# Patient Record
Sex: FEMALE | Race: WHITE | NOT HISPANIC OR LATINO | Employment: UNEMPLOYED | URBAN - METROPOLITAN AREA
[De-identification: names, ages, dates, MRNs, and addresses within clinical notes are randomized per-mention and may not be internally consistent; named-entity substitution may affect disease eponyms.]

---

## 2024-04-25 ENCOUNTER — HOSPITAL ENCOUNTER (EMERGENCY)
Facility: HOSPITAL | Age: 64
Discharge: HOME | End: 2024-04-25
Payer: COMMERCIAL

## 2024-04-25 VITALS
HEART RATE: 60 BPM | TEMPERATURE: 97 F | DIASTOLIC BLOOD PRESSURE: 80 MMHG | SYSTOLIC BLOOD PRESSURE: 120 MMHG | RESPIRATION RATE: 18 BRPM | OXYGEN SATURATION: 99 % | WEIGHT: 155 LBS | HEIGHT: 64 IN | BODY MASS INDEX: 26.46 KG/M2

## 2024-04-25 DIAGNOSIS — S05.01XA ABRASION OF RIGHT CORNEA, INITIAL ENCOUNTER: Primary | ICD-10-CM

## 2024-04-25 PROCEDURE — 90471 IMMUNIZATION ADMIN: CPT

## 2024-04-25 PROCEDURE — 99283 EMERGENCY DEPT VISIT LOW MDM: CPT | Mod: 25

## 2024-04-25 PROCEDURE — 2500000004 HC RX 250 GENERAL PHARMACY W/ HCPCS (ALT 636 FOR OP/ED)

## 2024-04-25 PROCEDURE — 2500000005 HC RX 250 GENERAL PHARMACY W/O HCPCS

## 2024-04-25 PROCEDURE — 90715 TDAP VACCINE 7 YRS/> IM: CPT

## 2024-04-25 PROCEDURE — 2500000001 HC RX 250 WO HCPCS SELF ADMINISTERED DRUGS (ALT 637 FOR MEDICARE OP)

## 2024-04-25 RX ORDER — ERYTHROMYCIN 5 MG/G
OINTMENT OPHTHALMIC 4 TIMES DAILY
Qty: 20 G | Refills: 0 | Status: SHIPPED | OUTPATIENT
Start: 2024-04-25 | End: 2024-04-30

## 2024-04-25 RX ORDER — TETRACAINE HYDROCHLORIDE 5 MG/ML
1 SOLUTION OPHTHALMIC ONCE
Status: COMPLETED | OUTPATIENT
Start: 2024-04-25 | End: 2024-04-25

## 2024-04-25 RX ORDER — TETRACAINE HYDROCHLORIDE 5 MG/ML
1 SOLUTION OPHTHALMIC ONCE
Status: DISCONTINUED | OUTPATIENT
Start: 2024-04-25 | End: 2024-04-25

## 2024-04-25 RX ORDER — TETRACAINE HYDROCHLORIDE 5 MG/ML
SOLUTION OPHTHALMIC
Status: COMPLETED
Start: 2024-04-25 | End: 2024-04-25

## 2024-04-25 RX ADMIN — TETRACAINE HYDROCHLORIDE 1 DROP: 5 SOLUTION/ DROPS OPHTHALMIC at 10:10

## 2024-04-25 RX ADMIN — FLUORESCEIN SODIUM 1 STRIP: 1 STRIP OPHTHALMIC at 10:10

## 2024-04-25 RX ADMIN — TETRACAINE HYDROCHLORIDE 1 DROP: 5 SOLUTION OPHTHALMIC at 10:10

## 2024-04-25 RX ADMIN — TETANUS TOXOID, REDUCED DIPHTHERIA TOXOID AND ACELLULAR PERTUSSIS VACCINE, ADSORBED 0.5 ML: 5; 2.5; 8; 8; 2.5 SUSPENSION INTRAMUSCULAR at 11:51

## 2024-04-25 ASSESSMENT — PAIN SCALES - GENERAL: PAINLEVEL_OUTOF10: 4

## 2024-04-25 ASSESSMENT — PAIN DESCRIPTION - ORIENTATION: ORIENTATION: RIGHT

## 2024-04-25 ASSESSMENT — LIFESTYLE VARIABLES
HAVE YOU EVER FELT YOU SHOULD CUT DOWN ON YOUR DRINKING: NO
HAVE PEOPLE ANNOYED YOU BY CRITICIZING YOUR DRINKING: NO
EVER HAD A DRINK FIRST THING IN THE MORNING TO STEADY YOUR NERVES TO GET RID OF A HANGOVER: NO
TOTAL SCORE: 0
EVER FELT BAD OR GUILTY ABOUT YOUR DRINKING: NO

## 2024-04-25 ASSESSMENT — PAIN DESCRIPTION - LOCATION: LOCATION: EYE

## 2024-04-25 ASSESSMENT — COLUMBIA-SUICIDE SEVERITY RATING SCALE - C-SSRS
1. IN THE PAST MONTH, HAVE YOU WISHED YOU WERE DEAD OR WISHED YOU COULD GO TO SLEEP AND NOT WAKE UP?: NO
2. HAVE YOU ACTUALLY HAD ANY THOUGHTS OF KILLING YOURSELF?: NO
6. HAVE YOU EVER DONE ANYTHING, STARTED TO DO ANYTHING, OR PREPARED TO DO ANYTHING TO END YOUR LIFE?: NO

## 2024-04-25 ASSESSMENT — PAIN DESCRIPTION - PAIN TYPE: TYPE: ACUTE PAIN

## 2024-04-25 ASSESSMENT — PAIN DESCRIPTION - DESCRIPTORS: DESCRIPTORS: BURNING;ACHING

## 2024-04-25 ASSESSMENT — PAIN - FUNCTIONAL ASSESSMENT: PAIN_FUNCTIONAL_ASSESSMENT: 0-10

## 2024-04-25 NOTE — ED PROVIDER NOTES
HPI   Chief Complaint   Patient presents with    Eye Pain     Right eye injured by projectile, unknown what object, pt believes it could have been a nail        Patient is a 63-year-old female presenting to the ED with cc of right eye injury times last night.  Patient states she was removing nails and something flew back and hit her eye.  Patient states she was not wearing any protective glasses.  Patient does wear glasses does not wear contact lenses.  Patient endorses eye pain.  Patient endorses photosensitivity.  Denies any pruritus.  Patient endorses clear discharge from the eye.  Patient does have visual changes in the eye states it is more blurry.  Patient denies any other symptoms.  Patient is not on any blood thinners.                          Bethesda Coma Scale Score: 15                     Patient History   History reviewed. No pertinent past medical history.  Past Surgical History:   Procedure Laterality Date    BACK SURGERY  01/30/2014    Back Surgery     No family history on file.  Social History     Tobacco Use    Smoking status: Not on file    Smokeless tobacco: Not on file   Substance Use Topics    Alcohol use: Not on file    Drug use: Not on file       Physical Exam   ED Triage Vitals [04/25/24 1008]   Temperature Heart Rate Respirations BP   36.1 °C (97 °F) 56 16 135/85      Pulse Ox Temp src Heart Rate Source Patient Position   98 % -- -- --      BP Location FiO2 (%)     -- --       Physical Exam  HENT:      Head: Normocephalic.   Eyes:      Extraocular Movements: Extraocular movements intact.      Pupils: Pupils are equal, round, and reactive to light.      Comments: Slight erythema of conjunctiva   Abdominal:      Palpations: Abdomen is soft.   Musculoskeletal:      Cervical back: Normal range of motion.   Neurological:      General: No focal deficit present.      Mental Status: She is alert and oriented to person, place, and time. Mental status is at baseline.         ED Course & MDM    Diagnoses as of 04/25/24 1205   Abrasion of right cornea, initial encounter       Medical Decision Making  Medical Decision Making:  Patient presented as described in HPI. Patient case including ROS, PE, and treatment and plan discussed with ED attending if attached as cosigner. Due to patients presentation orders completed include as documented.  Patient presents to the ED with cc of eye injury times last night.  Patient states she is having pain in the eye.  Patient wears glasses does not wear contact lenses.  Patient is nontoxic-appearing, PERRLA EOMI slightly injected conjunctiva, no obvious foreign body under the eyelids or of the eye.  Some periorbital edema.  Tetracaine drops were placed in the right eye.  Fluorescein stain Woods lamp was performed and there is a small corneal abrasion at the 11 o'clock position.  Jhon-Pen reading was 24.  Will repeat and check the left eye as well.  Visual acuity was not comfortable patient normally uses glasses which she does not have on cannot read the lines with both eyes.  Repeat, tonopen numbers for 26 and 22.  Left eye was 45.  Patient has no visual complaints of the left eye.  Do not believe these are accurate readings.  I spoke with Bello Shah patient's ophthalmologist whom she has an appointment with at 130 this afternoon who is aware of this and will evaluate patient in the office.  Patient updated on tetanus.  Patient discharged home with antibiotic eyedrops.  Patient educated on any worsening symptoms to return.  Patient endorses some improvement in symptoms.  Patient remained stable on discharge.  Patient was advised to follow up with PCP or recommended provider in 2-3 days for another evaluation and exam. I advised patient/guardian to return or go to closest emergency room immediately if symptoms change, get worse, new symptoms develop prior to follow up. If there is no improvement in symptoms in the next 24 hours they are advised to return for further  evaluation and exam. I also explained the plan and treatment course. Patient/guardian is in agreement with plan, treatment course, and follow up and states verbally that they will comply.      Patient care discussed with: N/A  Social Determinants affecting care: N/A    Final diagnosis and disposition as below.  See CI    Homegoing. I discussed the differential; results and discharge plan with the patient and/or family/friend/caregiver if present.  I emphasized the importance of follow-up with the physician I referred them to in the timeframe recommended.  I explained reasons for the patient to return to the Emergency Department. They agreed that if they feel their condition is worsening or if they have any other concern they should call 911 immediately for further assistance. I gave the patient an opportunity to ask all questions they had and answered all of them accordingly. They understand return precautions and discharge instructions. The patient and/or family/friend/caregiver expressed understanding verbally and that they would comply.       Disposition:  Discharge      This note has been transcribed using voice recognition and may contain grammatical errors, misplaced words, incorrect words, incorrect phrases or other errors.          Procedure  Procedures     Re aBrnes PA-C  04/25/24 1201

## 2024-04-30 ENCOUNTER — OFFICE VISIT (OUTPATIENT)
Dept: OPHTHALMOLOGY | Facility: CLINIC | Age: 64
End: 2024-04-30
Payer: COMMERCIAL

## 2024-04-30 ENCOUNTER — HOSPITAL ENCOUNTER (OUTPATIENT)
Dept: RADIOLOGY | Facility: HOSPITAL | Age: 64
Discharge: HOME | End: 2024-04-30
Payer: COMMERCIAL

## 2024-04-30 DIAGNOSIS — H27.8 ZONULAR DEHISCENCE: Primary | ICD-10-CM

## 2024-04-30 DIAGNOSIS — H43.01 VITREOUS PROLAPSE OF RIGHT EYE: ICD-10-CM

## 2024-04-30 DIAGNOSIS — S05.51XA INTRAOCULAR FOREIGN BODY OF RIGHT EYE, INITIAL ENCOUNTER: ICD-10-CM

## 2024-04-30 DIAGNOSIS — S05.91XA RIGHT EYE INJURY, INITIAL ENCOUNTER: ICD-10-CM

## 2024-04-30 PROCEDURE — 92285 EXTERNAL OCULAR PHOTOGRAPHY: CPT | Mod: RIGHT SIDE | Performed by: OPHTHALMOLOGY

## 2024-04-30 PROCEDURE — 70480 CT ORBIT/EAR/FOSSA W/O DYE: CPT

## 2024-04-30 PROCEDURE — 99204 OFFICE O/P NEW MOD 45 MIN: CPT | Performed by: OPHTHALMOLOGY

## 2024-04-30 PROCEDURE — 70480 CT ORBIT/EAR/FOSSA W/O DYE: CPT | Performed by: RADIOLOGY

## 2024-04-30 RX ORDER — PREDNISOLONE ACETATE 10 MG/ML
SUSPENSION/ DROPS OPHTHALMIC
COMMUNITY
Start: 2024-04-27 | End: 2024-05-25 | Stop reason: SDUPTHER

## 2024-04-30 RX ORDER — MOXIFLOXACIN 5 MG/ML
SOLUTION/ DROPS OPHTHALMIC
COMMUNITY
Start: 2024-04-25 | End: 2024-05-25 | Stop reason: HOSPADM

## 2024-04-30 RX ORDER — SODIUM PICOSULFATE, MAGNESIUM OXIDE, AND ANHYDROUS CITRIC ACID 10; 3.5; 12 MG/160ML; G/160ML; G/160ML
LIQUID ORAL
COMMUNITY
Start: 2022-08-26

## 2024-04-30 ASSESSMENT — EXTERNAL EXAM - RIGHT EYE: OD_EXAM: NORMAL

## 2024-04-30 ASSESSMENT — VISUAL ACUITY
OD_PH_CC: 20/100
OD_CC: 20/500
METHOD: SNELLEN - LINEAR

## 2024-04-30 ASSESSMENT — SLIT LAMP EXAM - LIDS
COMMENTS: NORMAL
COMMENTS: NORMAL

## 2024-04-30 ASSESSMENT — EXTERNAL EXAM - LEFT EYE: OS_EXAM: NORMAL

## 2024-04-30 ASSESSMENT — CUP TO DISC RATIO: OD_RATIO: 0.3

## 2024-04-30 NOTE — LETTER
April 30, 2024    Bello Shah MD  [facility]  [address] [address]    Patient: Saskia Koehler   YOB: 1960   Date of Visit: 4/30/2024       Dear Dr. Bello Shah MD:    Thank you for referring Saskia Koehler to me for evaluation. Below are the relevant portions of the exam, along with my assessment and plan of care.    Vision readings for this visit:  Visual Acuity (Snellen - Linear)         Right Left    Dist cc 20/500     Dist ph cc 20/100               Assessment/Plan:  Diagnoses and all orders for this visit:  Zonular dehiscence  Intraocular foreign body of right eye, initial encounter  -     CT orbit wo IV contrast; Future  Right eye injury, initial encounter  Vitreous prolapse of right eye  - patient presenting as a referral from outside provider (Dr. Bello Shah) for eye evaluation after trauma while removing nails from a 2x4 piece of wood  - no corneal laceration appreciated on exam, there is evidence of rust ring superotemporally  - noted to have zonular dehiscence superotemporally with vitroeus prolapsing into the anterior chamber (AC)  - DFE did not demonstrate any intraorbital foreign body - will get CT to rule out given the etiology of injury  - refer to retina after CT  - counseled that patient will likely need surgical removal of cataract to determine whether to proceed with anterior or posterior approach        If you have questions, please do not hesitate to call me. I look forward to following Saskia along with you.         Sincerely,        Ruben Liu MD        CC:   No Recipients

## 2024-04-30 NOTE — PROGRESS NOTES
Assessment/Plan   Diagnoses and all orders for this visit:  Zonular dehiscence  Intraocular foreign body of right eye, initial encounter  -     CT orbit wo IV contrast; Future  Right eye injury, initial encounter  Vitreous prolapse of right eye  - patient presenting as a referral from outside provider (Dr. Bello Shah) for eye evaluation after trauma while removing nails from a 2x4 piece of wood  - no corneal laceration appreciated on exam, there is evidence of rust ring superotemporally  - noted to have zonular dehiscence superotemporally with vitroeus prolapsing into the anterior chamber (AC)  - DFE did not demonstrate any intraorbital foreign body - will get CT to rule out given the etiology of injury  - refer to retina after CT  - counseled that patient will likely need surgical removal of cataract to determine whether to proceed with anterior or posterior approach

## 2024-05-02 ENCOUNTER — OFFICE VISIT (OUTPATIENT)
Dept: OPHTHALMOLOGY | Facility: CLINIC | Age: 64
End: 2024-05-02
Payer: MEDICARE

## 2024-05-02 DIAGNOSIS — H53.40 UNSPECIFIED VISUAL FIELD DEFECTS: ICD-10-CM

## 2024-05-02 DIAGNOSIS — H43.01 VITREOUS PROLAPSE OF RIGHT EYE: Primary | ICD-10-CM

## 2024-05-02 DIAGNOSIS — H26.101 TRAUMATIC CATARACT OF RIGHT EYE, UNSPECIFIED TRAUMATIC CATARACT TYPE: ICD-10-CM

## 2024-05-02 DIAGNOSIS — S05.91XD RIGHT EYE INJURY, SUBSEQUENT ENCOUNTER: ICD-10-CM

## 2024-05-02 PROCEDURE — 92134 CPTRZ OPH DX IMG PST SGM RTA: CPT | Performed by: OPHTHALMOLOGY

## 2024-05-02 PROCEDURE — 92136 OPHTHALMIC BIOMETRY: CPT | Performed by: OPHTHALMOLOGY

## 2024-05-02 PROCEDURE — 99213 OFFICE O/P EST LOW 20 MIN: CPT | Performed by: OPHTHALMOLOGY

## 2024-05-02 PROCEDURE — 92136 OPHTHALMIC BIOMETRY: CPT | Mod: BILATERAL PROCEDURE | Performed by: OPHTHALMOLOGY

## 2024-05-02 RX ORDER — PROPARACAINE HYDROCHLORIDE 5 MG/ML
1 SOLUTION/ DROPS OPHTHALMIC ONCE
Status: CANCELLED | OUTPATIENT
Start: 2024-05-02 | End: 2024-05-02

## 2024-05-02 RX ORDER — PHENYLEPHRINE HYDROCHLORIDE 25 MG/ML
1 SOLUTION/ DROPS OPHTHALMIC
Status: CANCELLED | OUTPATIENT
Start: 2024-05-02 | End: 2024-05-02

## 2024-05-02 RX ORDER — TROPICAMIDE 10 MG/ML
1 SOLUTION/ DROPS OPHTHALMIC
Status: CANCELLED | OUTPATIENT
Start: 2024-05-02 | End: 2024-05-02

## 2024-05-02 ASSESSMENT — EXTERNAL EXAM - LEFT EYE: OS_EXAM: NORMAL

## 2024-05-02 ASSESSMENT — VISUAL ACUITY
OD_CC: 20/300
OS_CC: 20/20
OD_PH_CC: 20/125
OS_CC+: -2
OD_PH_CC+: -1
METHOD: SNELLEN - LINEAR

## 2024-05-02 ASSESSMENT — EXTERNAL EXAM - RIGHT EYE: OD_EXAM: NORMAL

## 2024-05-02 ASSESSMENT — SLIT LAMP EXAM - LIDS
COMMENTS: NORMAL
COMMENTS: NORMAL

## 2024-05-02 ASSESSMENT — ENCOUNTER SYMPTOMS
GASTROINTESTINAL NEGATIVE: 0
NEUROLOGICAL NEGATIVE: 0
ENDOCRINE NEGATIVE: 0
RESPIRATORY NEGATIVE: 0
HEMATOLOGIC/LYMPHATIC NEGATIVE: 0
EYES NEGATIVE: 1
PSYCHIATRIC NEGATIVE: 0
CARDIOVASCULAR NEGATIVE: 0
ALLERGIC/IMMUNOLOGIC NEGATIVE: 0
CONSTITUTIONAL NEGATIVE: 0
MUSCULOSKELETAL NEGATIVE: 0

## 2024-05-02 ASSESSMENT — TONOMETRY
OS_IOP_MMHG: 11
OD_IOP_MMHG: 15
IOP_METHOD: GOLDMANN APPLANATION

## 2024-05-02 ASSESSMENT — CUP TO DISC RATIO: OD_RATIO: 0.3

## 2024-05-02 NOTE — PROGRESS NOTES
Subjective   Patient ID: Saskia Koehler is a 63 y.o. female.      HPI    64 YO F NP referred by Dr Liu for vitreous prolapse of OD, PT reports vision is the same, pt states always has floaters but they are unchanged, no flashes, no pian or irritation to eyes, PT using: moxi, pred and erythro  Last edited by Magalis Carvalho on 5/2/2024  2:17 PM.        Current Outpatient Medications (Ophthalmology pharm classes)   Medication Sig Dispense Refill    moxifloxacin (Vigamox) 0.5 % ophthalmic solution Administer 1 drop into right eye four times daily.      prednisoLONE acetate (Pred-Forte) 1 % ophthalmic suspension Administer 1 drop into right eye four times daily. Continue till follow up as directed       Current Outpatient Medications (Other)   Medication Sig Dispense Refill    sod picosulf-mag ox-citric ac (Clenpiq) 10 mg-3.5 gram- 12 gram/160 mL solution Take by mouth.         Objective   Base Eye Exam       Visual Acuity (Snellen - Linear)         Right Left    Dist cc 20/300 20/20 -2    Dist ph cc 20/125 -1               Tonometry (Goldmann Applanation, 2:24 PM)         Right Left    Pressure 15 11              Pupils         Light React APD    Right 6 None None    Left 2.5 Brisk None              Neuro/Psych       Oriented x3: Yes    Mood/Affect: Normal              Dilation       Both eyes: 1% Mydriacyl & 2.5% Winston  @ 2:24 PM                  Slit Lamp and Fundus Exam       External Exam         Right Left    External Normal Normal              Slit Lamp Exam         Right Left    Lids/Lashes Normal Normal    Conjunctiva/Sclera White and quiet White and quiet    Cornea No laceration, superotemporal rust ring Clear    Anterior Chamber 2+ Cell, vitrous prolapse superiorly Deep and quiet    Iris Round and reactive Round and reactive    Lens Clear, zonular dehiscence superotemporal Clear    Anterior Vitreous hazy view, vitreous prolapse temporally Normal              Fundus Exam         Right Left    Disc Normal Normal     C/D Ratio 0.3     Macula Normal Normal    Vessels Normal Normal    Periphery Normal Normal                  Imaging    Macula OCT 05/02/24  Right eye (OD): Normal contour and appearance, no IRF/subretinal fluid (SRF)  Left eye (OS): Normal contour and appearance, no IRF/subretinal fluid (SRF)      Assessment/Plan       Zonular dehiscence  Intraocular foreign body of right eye, initial encounter  -     CT orbit wo IV contrast; IMPRESSION: Unremarkable CT appearance of the orbits. No radiopaque foreign body.  Right eye injury, initial encounter  Vitreous prolapse of right eye  - patient presenting as a referral from outside provider (Dr. Bello Shah) for eye evaluation after trauma while removing nails from a 2x4 piece of wood  - no corneal laceration appreciated on exam, there is evidence of rust ring superotemporally  - noted to have zonular dehiscence superotemporally with vitroeus prolapsing into the anterior chamber (AC)  - CT with no foreign body (FB) noted  -Today no new floaters, no flashes, no shadows/curtains  -Vitreous prolapse, phacodonesis  -Calcs obtained    ADDENDUM 5/3/24: After discussion with Dr. Liu, will plan for staged approach, with anterior vitrectomy/cataract extraction (CE)/possible intraocular lens (IOL) with Dr. Liu. If capsular support insufficient for in the capsule or sulcus lens at that time, can then schedule for pars plana vitrectomy (PPV)/secondary intraocular lens (IOL) with me. Will call patient to discuss.

## 2024-05-10 ENCOUNTER — OFFICE VISIT (OUTPATIENT)
Dept: OPHTHALMOLOGY | Facility: CLINIC | Age: 64
End: 2024-05-10
Payer: MEDICARE

## 2024-05-10 DIAGNOSIS — S05.91XD RIGHT EYE INJURY, SUBSEQUENT ENCOUNTER: ICD-10-CM

## 2024-05-10 DIAGNOSIS — H27.8 ZONULAR DEHISCENCE: ICD-10-CM

## 2024-05-10 DIAGNOSIS — H26.101 TRAUMATIC CATARACT OF RIGHT EYE, UNSPECIFIED TRAUMATIC CATARACT TYPE: Primary | ICD-10-CM

## 2024-05-10 DIAGNOSIS — H43.01 VITREOUS PROLAPSE OF RIGHT EYE: ICD-10-CM

## 2024-05-10 PROCEDURE — 99213 OFFICE O/P EST LOW 20 MIN: CPT | Performed by: OPHTHALMOLOGY

## 2024-05-10 PROCEDURE — 1036F TOBACCO NON-USER: CPT | Performed by: OPHTHALMOLOGY

## 2024-05-10 PROCEDURE — 1160F RVW MEDS BY RX/DR IN RCRD: CPT | Performed by: OPHTHALMOLOGY

## 2024-05-10 PROCEDURE — 1159F MED LIST DOCD IN RCRD: CPT | Performed by: OPHTHALMOLOGY

## 2024-05-10 ASSESSMENT — CONF VISUAL FIELD
OD_INFERIOR_NASAL_RESTRICTION: 0
OD_INFERIOR_TEMPORAL_RESTRICTION: 0
OD_SUPERIOR_TEMPORAL_RESTRICTION: 0
OD_NORMAL: 1
OS_SUPERIOR_TEMPORAL_RESTRICTION: 0
OS_INFERIOR_NASAL_RESTRICTION: 0
OS_SUPERIOR_NASAL_RESTRICTION: 0
OS_NORMAL: 1
OS_INFERIOR_TEMPORAL_RESTRICTION: 0
OD_SUPERIOR_NASAL_RESTRICTION: 0
METHOD: COUNTING FINGERS

## 2024-05-10 ASSESSMENT — VISUAL ACUITY
CORRECTION_TYPE: GLASSES
METHOD: SNELLEN - LINEAR
OS_CC: 20/40
OD_CC+: -1
OD_CC: 20/400

## 2024-05-10 ASSESSMENT — TONOMETRY
IOP_METHOD: GOLDMANN APPLANATION
OD_IOP_MMHG: 18
OS_IOP_MMHG: 16

## 2024-05-10 ASSESSMENT — EXTERNAL EXAM - RIGHT EYE: OD_EXAM: NORMAL

## 2024-05-10 ASSESSMENT — SLIT LAMP EXAM - LIDS
COMMENTS: NORMAL
COMMENTS: NORMAL

## 2024-05-10 ASSESSMENT — EXTERNAL EXAM - LEFT EYE: OS_EXAM: NORMAL

## 2024-05-10 ASSESSMENT — CUP TO DISC RATIO: OD_RATIO: 0.3

## 2024-05-10 NOTE — PROGRESS NOTES
Assessment/Plan   Diagnoses and all orders for this visit:  Zonular dehiscence  Intraocular foreign body of right eye, initial encounter  -     CT orbit wo IV contrast; Future  Right eye injury, initial encounter  Vitreous prolapse of right eye  - Pt scheduled for CE/CTR/CTS/anterior vitrectomy 5/20  - However, amount of subluxation seems to be more extensive today   - Discussed different approaches of dealing with the subluxated lens (anterior vs posterior vs combined approach)  - Will discuss with Dr. Guzmán possibility for combined surgery. If not, I prefer that he would do this through a posterior approach.  - Discussed with pt and her     Addendum: After discussing with Dr. Guzmán, he will be performing the surgery. Info relayed to the pt.

## 2024-05-13 ENCOUNTER — PREP FOR PROCEDURE (OUTPATIENT)
Dept: OPHTHALMOLOGY | Facility: HOSPITAL | Age: 64
End: 2024-05-13
Payer: MEDICARE

## 2024-05-14 PROBLEM — H43.01 VITREOUS PROLAPSE OF RIGHT EYE: Status: ACTIVE | Noted: 2024-05-02

## 2024-05-14 PROBLEM — H26.101 TRAUMATIC CATARACT OF RIGHT EYE: Status: ACTIVE | Noted: 2024-05-02

## 2024-05-15 ENCOUNTER — ANESTHESIA EVENT (OUTPATIENT)
Dept: OPERATING ROOM | Facility: CLINIC | Age: 64
End: 2024-05-15

## 2024-05-15 VITALS — BODY MASS INDEX: 26.66 KG/M2 | WEIGHT: 155.31 LBS

## 2024-05-17 RX ORDER — ACETAMINOPHEN 325 MG/1
650 TABLET ORAL EVERY 4 HOURS PRN
Status: CANCELLED | OUTPATIENT
Start: 2024-05-17

## 2024-05-17 RX ORDER — ALBUTEROL SULFATE 0.83 MG/ML
2.5 SOLUTION RESPIRATORY (INHALATION) ONCE AS NEEDED
Status: CANCELLED | OUTPATIENT
Start: 2024-05-17

## 2024-05-17 RX ORDER — LIDOCAINE IN NACL,ISO-OSMOT/PF 30 MG/3 ML
0.1 SYRINGE (ML) INJECTION ONCE
Status: CANCELLED | OUTPATIENT
Start: 2024-05-17 | End: 2024-05-17

## 2024-05-17 RX ORDER — FENTANYL CITRATE 50 UG/ML
25 INJECTION, SOLUTION INTRAMUSCULAR; INTRAVENOUS EVERY 5 MIN PRN
Status: CANCELLED | OUTPATIENT
Start: 2024-05-17

## 2024-05-17 RX ORDER — LABETALOL HYDROCHLORIDE 5 MG/ML
5 INJECTION, SOLUTION INTRAVENOUS ONCE AS NEEDED
Status: CANCELLED | OUTPATIENT
Start: 2024-05-17

## 2024-05-17 RX ORDER — SODIUM CHLORIDE, SODIUM LACTATE, POTASSIUM CHLORIDE, CALCIUM CHLORIDE 600; 310; 30; 20 MG/100ML; MG/100ML; MG/100ML; MG/100ML
100 INJECTION, SOLUTION INTRAVENOUS CONTINUOUS
Status: CANCELLED | OUTPATIENT
Start: 2024-05-17

## 2024-05-17 RX ORDER — METOCLOPRAMIDE HYDROCHLORIDE 5 MG/ML
10 INJECTION INTRAMUSCULAR; INTRAVENOUS ONCE AS NEEDED
Status: CANCELLED | OUTPATIENT
Start: 2024-05-17

## 2024-05-17 RX ORDER — FENTANYL CITRATE 50 UG/ML
50 INJECTION, SOLUTION INTRAMUSCULAR; INTRAVENOUS EVERY 5 MIN PRN
Status: CANCELLED | OUTPATIENT
Start: 2024-05-17

## 2024-05-17 RX ORDER — ONDANSETRON HYDROCHLORIDE 2 MG/ML
4 INJECTION, SOLUTION INTRAVENOUS ONCE AS NEEDED
Status: CANCELLED | OUTPATIENT
Start: 2024-05-17

## 2024-05-20 ENCOUNTER — HOSPITAL ENCOUNTER (OUTPATIENT)
Facility: CLINIC | Age: 64
Setting detail: OUTPATIENT SURGERY
Discharge: HOME | End: 2024-05-20
Attending: OPHTHALMOLOGY | Admitting: OPHTHALMOLOGY
Payer: MEDICARE

## 2024-05-20 ENCOUNTER — ANESTHESIA (OUTPATIENT)
Dept: OPERATING ROOM | Facility: CLINIC | Age: 64
End: 2024-05-20
Payer: MEDICARE

## 2024-05-20 NOTE — H&P
History Of Present Illness  Saskia Koehler is a 64 y.o. female presenting with subluxed Crystaline lens, right eye.     Past Medical History  Past Medical History:   Diagnosis Date    Corneal abrasion     Diverticulosis     Eye trauma     Nephrolithiasis        Surgical History  Past Surgical History:   Procedure Laterality Date    BACK SURGERY  01/30/2014    Back Surgery    BACK SURGERY Bilateral     COLONOSCOPY          Social History  She reports that she has never smoked. She has never been exposed to tobacco smoke. She has never used smokeless tobacco. She reports current alcohol use of about 5.0 standard drinks of alcohol per week. Drug use questions deferred to the physician.    Family History  Family History   Problem Relation Name Age of Onset    No Known Problems Mother          Allergies  Morphine    Review of Systems  Review of Systems   Constitutional:  Negative for activity change and appetite change.   HENT:  Negative for sinus pressure and sinus pain.    Eyes:  Negative for pain, discharge, redness and itching.   Respiratory:  Negative for shortness of breath.    Cardiovascular:  Negative for chest pain.   Gastrointestinal:  Negative for abdominal distention and abdominal pain.   Endocrine: Negative for polyuria.   Genitourinary:  Negative for dysuria.   Musculoskeletal:  Negative for arthralgias.   Neurological:  Negative for headaches.   Psychiatric/Behavioral:  The patient is not nervous/anxious       Physical Exam  Physical Exam  HENT:      Head: Normocephalic and atraumatic.   Eyes:      Extraocular Movements: Extraocular movements intact.      Conjunctiva/sclera: Conjunctivae normal.      Pupils: Pupils are equal, round, and reactive to light.   Cardiovascular:      Pulses: Normal pulses.   Pulmonary:      Effort: Pulmonary effort is normal.   Abdominal:      General: Abdomen is flat.      Palpations: Abdomen is soft.   Musculoskeletal:         General: No deformity.   Skin:     General: Skin  is warm and dry.   Neurological:      General: No focal deficit present.      Mental Status: He is alert and oriented to person, place, and time.   Psychiatric:         Mood and Affect: Mood normal.        Last Recorded Vitals  Weight 70.5 kg (155 lb 5 oz).    Relevant Results         Assessment/Plan   Active Problems:    Vitreous prolapse of right eye    Traumatic cataract of right eye      Patient presents for lens surgery due to subluxed crystalline lens, right eye. Will proceed with pars plana vitrectomy (PPV)/secondary IOL, OD, operative note to follow.             Edilson Rod MD

## 2024-05-21 ENCOUNTER — PREP FOR PROCEDURE (OUTPATIENT)
Dept: OPHTHALMOLOGY | Facility: CLINIC | Age: 64
End: 2024-05-21
Payer: MEDICARE

## 2024-05-21 DIAGNOSIS — H26.101 TRAUMATIC CATARACT OF RIGHT EYE, UNSPECIFIED TRAUMATIC CATARACT TYPE: Primary | ICD-10-CM

## 2024-05-21 RX ORDER — TROPICAMIDE 10 MG/ML
1 SOLUTION/ DROPS OPHTHALMIC
OUTPATIENT
Start: 2024-05-21 | End: 2024-05-21

## 2024-05-21 RX ORDER — PHENYLEPHRINE HYDROCHLORIDE 25 MG/ML
1 SOLUTION/ DROPS OPHTHALMIC
OUTPATIENT
Start: 2024-05-21 | End: 2024-05-21

## 2024-05-21 RX ORDER — PROPARACAINE HYDROCHLORIDE 5 MG/ML
1 SOLUTION/ DROPS OPHTHALMIC ONCE
OUTPATIENT
Start: 2024-05-21 | End: 2024-05-21

## 2024-05-23 ENCOUNTER — PREP FOR PROCEDURE (OUTPATIENT)
Dept: OPHTHALMOLOGY | Facility: CLINIC | Age: 64
End: 2024-05-23
Payer: MEDICARE

## 2024-05-23 DIAGNOSIS — H26.131 TOTAL TRAUMATIC CATARACT, RIGHT EYE: Primary | ICD-10-CM

## 2024-05-23 RX ORDER — TROPICAMIDE 10 MG/ML
1 SOLUTION/ DROPS OPHTHALMIC
Status: CANCELLED | OUTPATIENT
Start: 2024-05-23 | End: 2024-05-23

## 2024-05-23 RX ORDER — PHENYLEPHRINE HYDROCHLORIDE 25 MG/ML
1 SOLUTION/ DROPS OPHTHALMIC
Status: CANCELLED | OUTPATIENT
Start: 2024-05-23 | End: 2024-05-23

## 2024-05-23 RX ORDER — PROPARACAINE HYDROCHLORIDE 5 MG/ML
1 SOLUTION/ DROPS OPHTHALMIC ONCE
Status: CANCELLED | OUTPATIENT
Start: 2024-05-23 | End: 2024-05-23

## 2024-05-24 ENCOUNTER — ANESTHESIA EVENT (OUTPATIENT)
Dept: OPERATING ROOM | Facility: CLINIC | Age: 64
End: 2024-05-24
Payer: MEDICARE

## 2024-05-24 ENCOUNTER — ANESTHESIA (OUTPATIENT)
Dept: OPERATING ROOM | Facility: CLINIC | Age: 64
End: 2024-05-24
Payer: MEDICARE

## 2024-05-24 ENCOUNTER — HOSPITAL ENCOUNTER (OUTPATIENT)
Facility: CLINIC | Age: 64
Setting detail: OUTPATIENT SURGERY
Discharge: HOME | End: 2024-05-24
Attending: OPHTHALMOLOGY | Admitting: OPHTHALMOLOGY
Payer: MEDICARE

## 2024-05-24 VITALS
DIASTOLIC BLOOD PRESSURE: 69 MMHG | HEIGHT: 64 IN | HEART RATE: 54 BPM | SYSTOLIC BLOOD PRESSURE: 119 MMHG | OXYGEN SATURATION: 97 % | TEMPERATURE: 96.8 F | RESPIRATION RATE: 16 BRPM | WEIGHT: 163.36 LBS | BODY MASS INDEX: 27.89 KG/M2

## 2024-05-24 DIAGNOSIS — H26.131 TOTAL TRAUMATIC CATARACT, RIGHT EYE: ICD-10-CM

## 2024-05-24 DIAGNOSIS — H26.131 TOTAL TRAUMATIC CATARACT OF RIGHT EYE: Primary | ICD-10-CM

## 2024-05-24 PROCEDURE — 3600000008 HC OR TIME - EACH INCREMENTAL 1 MINUTE - PROCEDURE LEVEL THREE: Performed by: OPHTHALMOLOGY

## 2024-05-24 PROCEDURE — 2500000005 HC RX 250 GENERAL PHARMACY W/O HCPCS: Performed by: OPHTHALMOLOGY

## 2024-05-24 PROCEDURE — 67036 REMOVAL OF INNER EYE FLUID: CPT | Performed by: STUDENT IN AN ORGANIZED HEALTH CARE EDUCATION/TRAINING PROGRAM

## 2024-05-24 PROCEDURE — 3700000002 HC GENERAL ANESTHESIA TIME - EACH INCREMENTAL 1 MINUTE: Performed by: OPHTHALMOLOGY

## 2024-05-24 PROCEDURE — 2500000004 HC RX 250 GENERAL PHARMACY W/ HCPCS (ALT 636 FOR OP/ED): Performed by: ANESTHESIOLOGIST ASSISTANT

## 2024-05-24 PROCEDURE — 67036 REMOVAL OF INNER EYE FLUID: CPT | Performed by: OPHTHALMOLOGY

## 2024-05-24 PROCEDURE — 2500000004 HC RX 250 GENERAL PHARMACY W/ HCPCS (ALT 636 FOR OP/ED): Performed by: OPHTHALMOLOGY

## 2024-05-24 PROCEDURE — 3700000001 HC GENERAL ANESTHESIA TIME - INITIAL BASE CHARGE: Performed by: OPHTHALMOLOGY

## 2024-05-24 PROCEDURE — 7100000009 HC PHASE TWO TIME - INITIAL BASE CHARGE: Performed by: OPHTHALMOLOGY

## 2024-05-24 PROCEDURE — A4217 STERILE WATER/SALINE, 500 ML: HCPCS | Performed by: OPHTHALMOLOGY

## 2024-05-24 PROCEDURE — 2720000007 HC OR 272 NO HCPCS: Performed by: OPHTHALMOLOGY

## 2024-05-24 PROCEDURE — 7100000010 HC PHASE TWO TIME - EACH INCREMENTAL 1 MINUTE: Performed by: OPHTHALMOLOGY

## 2024-05-24 PROCEDURE — 2500000001 HC RX 250 WO HCPCS SELF ADMINISTERED DRUGS (ALT 637 FOR MEDICARE OP): Performed by: OPHTHALMOLOGY

## 2024-05-24 PROCEDURE — 66985 INSERT LENS PROSTHESIS: CPT | Performed by: OPHTHALMOLOGY

## 2024-05-24 PROCEDURE — A67036 PR VITRECTOMY,MECHANICAL: Performed by: ANESTHESIOLOGIST ASSISTANT

## 2024-05-24 PROCEDURE — A67036 PR VITRECTOMY,MECHANICAL: Performed by: ANESTHESIOLOGY

## 2024-05-24 PROCEDURE — 2780000003 HC OR 278 NO HCPCS: Performed by: OPHTHALMOLOGY

## 2024-05-24 PROCEDURE — 3600000003 HC OR TIME - INITIAL BASE CHARGE - PROCEDURE LEVEL THREE: Performed by: OPHTHALMOLOGY

## 2024-05-24 DEVICE — 3-PIECE, MONOFOCAL, HYDROPHOBIC, ACRYLIC, INTRAOCULAR LENS, +19.0D
Type: IMPLANTABLE DEVICE | Site: EYE | Status: FUNCTIONAL
Brand: CT LUCIA

## 2024-05-24 RX ORDER — ATROPINE SULFATE 10 MG/ML
SOLUTION/ DROPS OPHTHALMIC AS NEEDED
Status: DISCONTINUED | OUTPATIENT
Start: 2024-05-24 | End: 2024-05-24 | Stop reason: HOSPADM

## 2024-05-24 RX ORDER — LIDOCAINE HYDROCHLORIDE 10 MG/ML
INJECTION, SOLUTION EPIDURAL; INFILTRATION; INTRACAUDAL; PERINEURAL AS NEEDED
Status: DISCONTINUED | OUTPATIENT
Start: 2024-05-24 | End: 2024-05-24 | Stop reason: HOSPADM

## 2024-05-24 RX ORDER — SODIUM CHLORIDE, SODIUM LACTATE, POTASSIUM CHLORIDE, CALCIUM CHLORIDE 600; 310; 30; 20 MG/100ML; MG/100ML; MG/100ML; MG/100ML
INJECTION, SOLUTION INTRAVENOUS CONTINUOUS PRN
Status: DISCONTINUED | OUTPATIENT
Start: 2024-05-24 | End: 2024-05-24

## 2024-05-24 RX ORDER — DEXAMETHASONE SODIUM PHOSPHATE 100 MG/10ML
INJECTION INTRAMUSCULAR; INTRAVENOUS AS NEEDED
Status: DISCONTINUED | OUTPATIENT
Start: 2024-05-24 | End: 2024-05-24 | Stop reason: HOSPADM

## 2024-05-24 RX ORDER — PHENYLEPHRINE HYDROCHLORIDE 25 MG/ML
1 SOLUTION/ DROPS OPHTHALMIC
Status: COMPLETED | OUTPATIENT
Start: 2024-05-24 | End: 2024-05-24

## 2024-05-24 RX ORDER — PROPARACAINE HYDROCHLORIDE 5 MG/ML
1 SOLUTION/ DROPS OPHTHALMIC ONCE
Status: COMPLETED | OUTPATIENT
Start: 2024-05-24 | End: 2024-05-24

## 2024-05-24 RX ORDER — CEFAZOLIN 1 G/1
INJECTION, POWDER, FOR SOLUTION INTRAVENOUS AS NEEDED
Status: DISCONTINUED | OUTPATIENT
Start: 2024-05-24 | End: 2024-05-24 | Stop reason: HOSPADM

## 2024-05-24 RX ORDER — PREDNISOLONE ACETATE 10 MG/ML
1 SUSPENSION/ DROPS OPHTHALMIC 4 TIMES DAILY
Qty: 5 ML | Refills: 2 | Status: SHIPPED | OUTPATIENT
Start: 2024-05-24 | End: 2024-05-25 | Stop reason: SDUPTHER

## 2024-05-24 RX ORDER — POVIDONE-IODINE 5 %
SOLUTION, NON-ORAL OPHTHALMIC (EYE) AS NEEDED
Status: DISCONTINUED | OUTPATIENT
Start: 2024-05-24 | End: 2024-05-24 | Stop reason: HOSPADM

## 2024-05-24 RX ORDER — MIDAZOLAM HYDROCHLORIDE 1 MG/ML
INJECTION, SOLUTION INTRAMUSCULAR; INTRAVENOUS AS NEEDED
Status: DISCONTINUED | OUTPATIENT
Start: 2024-05-24 | End: 2024-05-24

## 2024-05-24 RX ORDER — BUPIVACAINE HYDROCHLORIDE 7.5 MG/ML
INJECTION, SOLUTION EPIDURAL; RETROBULBAR AS NEEDED
Status: DISCONTINUED | OUTPATIENT
Start: 2024-05-24 | End: 2024-05-24 | Stop reason: HOSPADM

## 2024-05-24 RX ORDER — WATER 1 ML/ML
IRRIGANT IRRIGATION AS NEEDED
Status: DISCONTINUED | OUTPATIENT
Start: 2024-05-24 | End: 2024-05-24 | Stop reason: HOSPADM

## 2024-05-24 RX ORDER — OFLOXACIN 3 MG/ML
1 SOLUTION/ DROPS OPHTHALMIC 4 TIMES DAILY
Qty: 5 ML | Refills: 0 | Status: SHIPPED | OUTPATIENT
Start: 2024-05-24 | End: 2024-05-31

## 2024-05-24 RX ORDER — WATER 1000 ML/1000ML
INJECTION, SOLUTION INTRAVENOUS CONTINUOUS PRN
Status: COMPLETED | OUTPATIENT
Start: 2024-05-24 | End: 2024-05-24

## 2024-05-24 RX ORDER — TRIAMCINOLONE ACETONIDE 40 MG/ML
INJECTION, SUSPENSION INTRA-ARTICULAR; INTRAMUSCULAR AS NEEDED
Status: DISCONTINUED | OUTPATIENT
Start: 2024-05-24 | End: 2024-05-24 | Stop reason: HOSPADM

## 2024-05-24 RX ORDER — TROPICAMIDE 10 MG/ML
1 SOLUTION/ DROPS OPHTHALMIC
Status: COMPLETED | OUTPATIENT
Start: 2024-05-24 | End: 2024-05-24

## 2024-05-24 RX ORDER — PROPOFOL 10 MG/ML
INJECTION, EMULSION INTRAVENOUS AS NEEDED
Status: DISCONTINUED | OUTPATIENT
Start: 2024-05-24 | End: 2024-05-24

## 2024-05-24 RX ADMIN — PHENYLEPHRINE HYDROCHLORIDE 1 DROP: 25 SOLUTION/ DROPS OPHTHALMIC at 08:30

## 2024-05-24 RX ADMIN — TROPICAMIDE 1 DROP: 10 SOLUTION/ DROPS OPHTHALMIC at 08:20

## 2024-05-24 RX ADMIN — PHENYLEPHRINE HYDROCHLORIDE 1 DROP: 25 SOLUTION/ DROPS OPHTHALMIC at 08:40

## 2024-05-24 RX ADMIN — TROPICAMIDE 1 DROP: 10 SOLUTION/ DROPS OPHTHALMIC at 08:30

## 2024-05-24 RX ADMIN — TROPICAMIDE 1 DROP: 10 SOLUTION/ DROPS OPHTHALMIC at 08:40

## 2024-05-24 RX ADMIN — PROPOFOL 70 MG: 10 INJECTION, EMULSION INTRAVENOUS at 09:13

## 2024-05-24 RX ADMIN — SODIUM CHLORIDE, SODIUM LACTATE, POTASSIUM CHLORIDE, AND CALCIUM CHLORIDE: .6; .31; .03; .02 INJECTION, SOLUTION INTRAVENOUS at 09:09

## 2024-05-24 RX ADMIN — MIDAZOLAM 2 MG: 1 INJECTION INTRAMUSCULAR; INTRAVENOUS at 09:11

## 2024-05-24 RX ADMIN — PHENYLEPHRINE HYDROCHLORIDE 1 DROP: 25 SOLUTION/ DROPS OPHTHALMIC at 08:20

## 2024-05-24 RX ADMIN — PROPARACAINE HYDROCHLORIDE 1 DROP: 5 SOLUTION/ DROPS OPHTHALMIC at 08:20

## 2024-05-24 ASSESSMENT — PAIN SCALES - GENERAL
PAINLEVEL_OUTOF10: 0 - NO PAIN

## 2024-05-24 ASSESSMENT — COLUMBIA-SUICIDE SEVERITY RATING SCALE - C-SSRS
6. HAVE YOU EVER DONE ANYTHING, STARTED TO DO ANYTHING, OR PREPARED TO DO ANYTHING TO END YOUR LIFE?: NO
2. HAVE YOU ACTUALLY HAD ANY THOUGHTS OF KILLING YOURSELF?: NO
1. IN THE PAST MONTH, HAVE YOU WISHED YOU WERE DEAD OR WISHED YOU COULD GO TO SLEEP AND NOT WAKE UP?: NO

## 2024-05-24 ASSESSMENT — PAIN - FUNCTIONAL ASSESSMENT
PAIN_FUNCTIONAL_ASSESSMENT: 0-10

## 2024-05-24 NOTE — ANESTHESIA POSTPROCEDURE EVALUATION
Patient: Saskia Koehler    Procedure Summary       Date: 05/24/24 Room / Location: [facility] OR 04 / Virtual [facility] OR    Anesthesia Start: 0902 Anesthesia Stop: 1110    Procedure: Vitrectomy Pars Plana with cataract extraction and secondary IOL placement (Right) Diagnosis:       Total traumatic cataract, right eye      (Total traumatic cataract, right eye [H26.131])    Surgeons: aMnoj Guzmán MD Responsible Provider: Odalis Persaud DO    Anesthesia Type: MAC ASA Status: 2            Anesthesia Type: MAC    Vitals Value Taken Time   /69 05/24/24 1120   Temp 36 °C (96.8 °F) 05/24/24 1120   Pulse 54 05/24/24 1120   Resp 16 05/24/24 1120   SpO2 97 % 05/24/24 1120       Anesthesia Post Evaluation    Patient location during evaluation: PACU  Patient participation: complete - patient participated  Level of consciousness: awake  Pain management: satisfactory to patient  Multimodal analgesia pain management approach  Airway patency: patent  Cardiovascular status: acceptable  Respiratory status: acceptable  Hydration status: acceptable  Postoperative Nausea and Vomiting: none    There were no known notable events for this encounter.

## 2024-05-24 NOTE — H&P
History Of Present Illness  Saskia Koehler is a 64 y.o. female presenting with posterior dislocation of the crystalline lens of the RIGHT  EYE. This has caused visual decline. .     Past Medical History  She has a past medical history of Corneal abrasion, Diverticulosis, Eye trauma, and Nephrolithiasis.    Surgical History  She has a past surgical history that includes Back surgery (01/30/2014); Back surgery (Bilateral); and Colonoscopy.     Social History  She reports that she has never smoked. She has never been exposed to tobacco smoke. She has never used smokeless tobacco. She reports current alcohol use of about 5.0 standard drinks of alcohol per week. Drug use questions deferred to the physician.     Allergies  Morphine    ROS  Patient denies ocular pain, redness, discharge, decreased vision, double vision, blind spots, flashes, or floaters.   Patient also denied any recent illnesses/infections, angina, dyspnea,palpitations, cough, wheeze, abdominal pain, melena, hematuria  Physical Exam  Alert & oriented x 3  Regular radial pulses, bilateral  Normal chest rise with respirations       Assessment/Plan   Principal Problem:    Traumatic cataract of right eye  Active Problems:    Total traumatic cataract, right eye      Will plan to proceed with pars plana vitrectomy, lensectomy, with secondary scleral fixated intraocular lens implant - right eye    I spent 10 minutes in the professional and overall care of this patient.      Shukri Corral MD

## 2024-05-24 NOTE — ANESTHESIA PREPROCEDURE EVALUATION
Patient: Saskia Koehler    Procedure Information       Date/Time: 05/24/24 0900    Procedure: Vitrectomy Pars Plana with cataract extraction and secondary IOL placement (Right)    [place] [facility] [place] [facility] [place] [facility] / [facility] [place] [facility]    Surgeons: Manoj Guzmán MD            Relevant Problems   Anesthesia (within normal limits)      Cardiac (within normal limits)  > 4 mets      Pulmonary (within normal limits)      Neuro (within normal limits)      GI (within normal limits)      /Renal (within normal limits)      Liver (within normal limits)      Endocrine (within normal limits)      Hematology (within normal limits)      Musculoskeletal (within normal limits)      HEENT (within normal limits)      ID (within normal limits)      Skin (within normal limits)      GYN (within normal limits)       Clinical information reviewed:   Tobacco  Allergies  Meds   Med Hx  Surg Hx  OB Status  Fam Hx  Soc   Hx        NPO Detail:  NPO/Void Status  Date of Last Liquid: 05/23/24  Time of Last Liquid: 1900  Date of Last Solid: 05/23/24  Time of Last Solid: 1900  Time of Last Void: 0814         Physical Exam    Airway  Mallampati: II  TM distance: >3 FB  Neck ROM: full     Cardiovascular    Dental - normal exam     Pulmonary    Abdominal        Anesthesia Plan    History of general anesthesia?: yes  History of complications of general anesthesia?: no    ASA 2     MAC     intravenous induction   Anesthetic plan and risks discussed with patient.    Plan discussed with CAA.

## 2024-05-24 NOTE — BRIEF OP NOTE
Date: 2024  OR Location: [facility] OR    Name: Saskia Koehler, : 1960, Age: 64 y.o., MRN: [id number], Sex: female    Diagnosis  Pre-op Diagnosis     * Total traumatic cataract, right eye [H26.131] Post-op Diagnosis     * Total traumatic cataract, right eye [H26.131]     Procedures  Vitrectomy Pars Plana with cataract extraction and secondary IOL placement  67335 - ND VITRECTOMY MECHANICAL PARS PLANA    ND INSJ IO LENS PROSTHESIS NOT W/CONCURRENT RMVL [21700]  Surgeons      * Manoj Guzmán - Primary    Resident/Fellow/Other Assistant:  Surgeons and Role:     * Shukri Corral MD - Assisting    Procedure Summary  Anesthesia: Monitor Anesthesia Care  ASA: II  Anesthesia Staff: Anesthesiologist: Odalis Persaud DO  C-AA: INOCENCIO Luna  Estimated Blood Loss: 0 mL  Intra-op Medications:   Administrations occurring from 0750 to 0950 on 24:   Medication Name Total Dose   balanced salts (BSS) intraocular solution 15 mL   balanced salts (BSS Plus) intraocular solution 500 mL   sterile water irrigation solution 100 mL   povidone-iodine 5 % ophthalmic solution 1 Application   bupivacaine PF (Marcaine) injection 0.75 % 4 mL   triamcinolone acetonide (Kenalog-40) injection 40 mg   chondroitin sulf-sod hyaluron (Viscoat) intraocular injection 0.5 mL   lidocaine PF (Xylocaine) 10 mg/mL (1 %) injection 4 mL   phenylephrine (Mydfrin) 2.5 % ophthalmic solution 1 drop 3 drop   proparacaine (Alcaine) 0.5 % ophthalmic solution 1 drop 1 drop   tropicamide (Mydriacyl) 1 % ophthalmic solution 1 drop 3 drop          Anesthesia Record               Intraprocedure I/O Totals          Intake    LR infusion 200.00 mL    Total Intake 200 mL          Specimen: No specimens collected     Staff:   Circulator: Alexus Cat Person: Roger    Findings: Total traumatic cataract - right eye    Complications:  None; patient tolerated the procedure well.     Disposition: PACU - hemodynamically stable.  Condition:  stable  Specimens Collected: No specimens collected    Attending Attestation:     A qualified resident physician was not available and the use of a skilled assistant surgeon, Shukri Corral MD, was required for the following portions of this case: Pars plana vitrectomy with scleral depression, lensectomy, secondary scleral fixated intraocular lens - right eye    Manoj Guzmán  Phone Number: [phone]

## 2024-05-24 NOTE — OP NOTE
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]     Procedures  Vitrectomy Pars Plana with cataract extraction and secondary IOL placement  86134 - SD VITRECTOMY MECHANICAL PARS PLANA    SD INSJ IO LENS PROSTHESIS NOT W/CONCURRENT RMVL [60193]  Surgeons      * Manoj Guzmán - Primary    Resident/Fellow/Other Assistant:  Surgeons and Role:     * Shukri Corral MD - Assisting    Procedure Summary  Anesthesia: Monitor Anesthesia Care  ASA: II  Anesthesia Staff: Anesthesiologist: Odalis Persaud DO  C-AA: INOCENCIO Luna  Estimated Blood Loss: 0 mL  Intra-op Medications:   Administrations occurring from 0750 to 0950 on 24:   Medication Name Total Dose   balanced salts (BSS) intraocular solution 15 mL   balanced salts (BSS Plus) intraocular solution 500 mL   sterile water irrigation solution 100 mL   povidone-iodine 5 % ophthalmic solution 1 Application   bupivacaine PF (Marcaine) injection 0.75 % 4 mL   triamcinolone acetonide (Kenalog-40) injection 40 mg   chondroitin sulf-sod hyaluron (Viscoat) intraocular injection 0.5 mL   lidocaine PF (Xylocaine) 10 mg/mL (1 %) injection 4 mL   phenylephrine (Mydfrin) 2.5 % ophthalmic solution 1 drop 3 drop   proparacaine (Alcaine) 0.5 % ophthalmic solution 1 drop 1 drop   tropicamide (Mydriacyl) 1 % ophthalmic solution 1 drop 3 drop          Anesthesia Record               Intraprocedure I/O Totals          Intake    LR infusion 200.00 mL    Total Intake 200 mL          Specimen: No specimens collected     Staff:   Circulator: Alexus Cat Person: Roger         Drains and/or Catheters: * None in log *    Tourniquet Times: n/a         Implants:  Implants       Type Name Action Serial No.       19.0 DIOPTER, CT SARAH 602.US INTRAOCULAR LENS, 3-PIECE, MONOFOCAL, HYDROPHOBIC, ACRYLIC Implanted [id number]              Findings: total traumatic cataract, right eye    Indications: Saskia Koehler is an 64 y.o. 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     The patient was seen in the preoperative area. The risks, benefits, complications, treatment options, non-operative alternatives, expected recovery and outcomes were discussed with the patient. The possibilities of reaction to medication, pulmonary aspiration, injury to surrounding structures, bleeding, recurrent infection, the need for additional procedures, failure to diagnose a condition, and creating a complication requiring transfusion or operation were discussed with the patient. The patient concurred with the proposed plan, giving informed consent.  The site of surgery was properly noted/marked if necessary per policy. The patient has been actively warmed in preoperative area. Preoperative antibiotics are not indicated. Venous thrombosis prophylaxis are not indicated.    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]     OPERATION PERFORMED  Right  25-gauge pars plana vitrectomy, Pars plana lensectomy, Intravitreal triesence, and Secondary scleral fixation of intraocular lens (Modified Yamane Technique)     SURGEON: Manoj Guzmán MD    ASSISTANT: Shukri Corral MD    ANESTHESIA  Monitored anesthesia care with a standard block consisting of a 1:1 mixture of 4 %  lidocaine and 0.75% Marcaine with Wydase     FINDINGS  As detailed below     COMPLICATIONS  None     ESTIMATED BLOOD LOSS  Minimal     SPECIMENS REMOVED  None     JUSTIFICATION  Saskia Koehler  had a posterior endocapsular dislocation of an intraocular lens in her right eye in the setting of trauma. This was causing decreased visual function. The risks, benefits, and alternatives to the procedure were discussed with the patient including the risk for vision loss, blindness, retinal detachment, infection, bleeding, pain, high or low pressure in the eye, double vision, no benefit, need for additional  procedures, and droopy eyelid, amongst others. The patient had a full opportunity to have all questions answered in clinic prior to surgery. Afterwards, the patient requested that we perform surgery and signed the consent form.     PROCEDURE:  The patient was brought to the preoperative holding area where the correct eye was confirmed and marked. The patient was brought to the operating room where a secondary time-out was  performed to identify the correct patient, eye, procedure, and any allergies. The patient then underwent intravenous sedation by the anesthesia team followed by a block as described above. The eye was prepped and draped in the usual sterile ophthalmic fashion followed by placement of a lid speculum.     Using a toric marker, the horizontal meridian from 3 to 9 oclock was marked. A 25 gauge trocar was inserted inferotemporally 4 mm posterior to the limbus after conjunctival displacement. A 4 mm infusion cannula was placed through this trocar, and the infusion cannula was confirmed in the vitreous cavity prior to turning it on.  A second 27-gauge trocar was placed 3 mm posterior and 2 mm superior to the limbus at the nasal horizontal meridian . A third 27-gauge trocar was placed 3 mm posterior and 2 mm inferior to the limbus at the temporal horizontal meridian. Two additional 25 gauge trocars were placed at 11 and 12 oclock  4mm posterior to the limbus..      At this time, a standard three-port pars plana vitrectomy was performed using the light pipe, the cutter, and the BIOM viewing system. A  core vitreous dissection was performed. The retina was inspected and was found to be attached with no retinal tears. A small superior conjunctival peritomy was made 3mm posterior to the limbus with wescot scissors. A 20 gauge MVR blade was used to make a superior sclerotomy approximated 3 mm in length. A 20 gauge fragmatome was used to remove the traumatic cataract; care was taken to ensure that all nuclear and cortical fragments were removed.  Intravitreal triamcinolone was instilled into the eye to stain the vitreous. A PVD was induced the the vitrector and Additional peripheral shave vitrectomy was then performed.     Using the vitreous cutter, all residual capsular material was dissected and aspirated. Peripheral scleral depression was performed and no retinal tears were visualized. A 3-piece CT Nilsa 602 lens (Power: +19.00 D, SN: [id number], Exp: 10/31/2026), providing plano correction, was then selected and injected into the anterior segment. Once the lens was brought to the anterior vitreous cavity, the surgeon grasped the tip of the leading haptic with his left hand using a 27g scleral fixation forceps and gently externalized through the left sclerotomy after the trocar had been removed in an upward fashion along the shaft of the forceps. Once externalized, the tip of the haptic was melted for 1 mm with a low temperature handheld cautery to form a distal bulb. This same technique was repeated for the rightward haptic in the same fashion and using a 27g scleral fixation forceps through the right trocar, followed by cauterization of the externalized haptic tip. The haptics were inserted into their respective sclerotomy tunnels and the lens was suspended posterior to the iris and centralized. The lens was noted to mild tilted so the decision was made to perform laser locking; the light pipe entered through the superotemporal trocar to stabilize the lens in its correct planar orientation. The laser was then  introduced through the main corneal incision and endolaser was applied to both haptic/optic junctions to lock the haptics in the correct orientation. After the laser  lock the lens appeared well centered and stable without obvious tilt    The corneal wound was sutured with 10-0 ethilon. There was no panfilo present. The superonasal trocar was removed and sutured with 7-0 vicryl. The superotemporal trocar was removed and was found to be watertight. The infusion cannula and associated trocar were then removed and found to be watertight. The eye was  confirmed to be at a physiologic level by digital palpation after removal of the trocars.     Subconjunctival injection of Cefazolin and Dexamethasone were administered. The lid speculum and drapes were removed. Atropine drops and Antibiotic ointment was applied. The eye was patched and shielded. The patient tolerated the procedure well without any intraoperative or immediate postoperative complications. The patient was taken to the recovery room in good condition. The patient will follow up with Manoj Guzmán MD in clinic on the following morning.    Complications:  None; patient tolerated the procedure well.    Disposition: PACU - hemodynamically stable.  Condition: stable     Additional Details:     A qualified resident physician was not available and the use of a skilled assistant surgeon, Shukri Corral MD, was required for the following portions of this case: Pars plana vitrectomy with scleral depression, lensectomy, secondary scleral fixated intraocular lens, lens laser locking - right eye    Attending Attestation:     Manoj Guzmán  Phone Number: [phone]

## 2024-05-24 NOTE — DISCHARGE INSTRUCTIONS
Please keep the eye patched/shielded until your post op day 1 appointment tomorrow.    Appointment: [facility], [facility]  Eye Clinic, 3rd floor - You will be seen by the resident physician on call, the time is to be determined     Please do not perform any strenuous activity, bending below the waist, until you are cleared by your doctor.

## 2024-05-25 ENCOUNTER — DOCUMENTATION (OUTPATIENT)
Dept: OPHTHALMOLOGY | Facility: HOSPITAL | Age: 64
End: 2024-05-25
Payer: MEDICARE

## 2024-05-25 DIAGNOSIS — T85.22XA DISLOCATED IOL (INTRAOCULAR LENS), POSTERIOR: Primary | ICD-10-CM

## 2024-05-25 RX ORDER — MOXIFLOXACIN 5 MG/ML
1 SOLUTION/ DROPS OPHTHALMIC 4 TIMES DAILY
Qty: 5 ML | Refills: 1 | Status: SHIPPED | OUTPATIENT
Start: 2024-05-25 | End: 2024-06-01

## 2024-05-25 RX ORDER — PREDNISOLONE ACETATE 10 MG/ML
1 SUSPENSION/ DROPS OPHTHALMIC 4 TIMES DAILY
Qty: 5 ML | Refills: 1 | Status: SHIPPED | OUTPATIENT
Start: 2024-05-25 | End: 2024-06-08

## 2024-05-25 ASSESSMENT — VISUAL ACUITY
OS_PH_SC: 20/25+2
OS_SC: 20/60
OD_SC: 20/80-1
METHOD: SNELLEN - LINEAR
OD_PH_SC: 20/30

## 2024-05-25 ASSESSMENT — SLIT LAMP EXAM - LIDS
COMMENTS: NORMAL
COMMENTS: NORMAL

## 2024-05-25 ASSESSMENT — EXTERNAL EXAM - RIGHT EYE: OD_EXAM: NORMAL

## 2024-05-25 ASSESSMENT — EXTERNAL EXAM - LEFT EYE: OS_EXAM: NORMAL

## 2024-05-26 NOTE — PROGRESS NOTES
HPI:  Pt is 64 YOF status post (s/p) pars plana vitrectomy (PPV)/lensectomy/w seocndary intraocular lens (IOL) placement (Arielle/Seraly 5/24/24) here for postop day 1.    Pt is doing well, some moderate to mild pain relieved by tylenol.  .   Pt denies  discharge, decreased vision, double vision, blind spots, flashes or floaters.    PMH: As per HPI  Allergies: allergy list reviewed  Past Ocular Hx: As per HPI  Family Hx: No hx of glaucoma, age related macular degeneration, blindness  Social Hx: lives with family    ROS: negative in all 14 systems except for those listed in HPI  Medications: the list in the system reviewed    Visual Acuity with Correction at Distance: right Eye 20/80 PH 20/30 left eye 20/25  Pupils: OD pharmacologically dilated  Intraocular Pressure with Tonopen: right eye 19; left eye 16  Extra Ocular Movements: intact and full both eyes  Confrontational Visual Field: full to count fingers both eyes    Anterior Segment, Right Eye:   External: no bruises and step-offs around the eye  Lids/Lashes: within normal limits, no ptosis, no meibomian gland dysfunction, no blepharitis  Conjunctiva and Sclera: no hyperemia, no chemosis  Cornea: 1+ SPL  Anterior Chamber: 3-4+ cell/flare  Iris: pharmacologically dilated  Lens: Secondary intraocular lens (IOL) in good position     Assessment/Plan:    #Status post (s/p) pars plana vitrectomy (PPV)/lensectomy/w seocndary intraocular lens (IOL) placement (Arielle/Seraly 5/24/24)   -Pt with 3+ cell, intraocular pressure (IOP) WNL, Vision good  -Recommend pred forte QID OD  -Recommend vigamox QID OD   -F/U Thurs with Dr. Guzmán (secretaries emailed)  -Return precautions discussed    Discussed with Dr. Guzmán who agrees with assessment and plan as above.    Felicia Osborne PGY3  Ophthalmology    Ophthalmology Adult Pager - [phone]  Ophthalmology Pediatrics Pager - [phone]    For adult follow-up appointments, call: [phone]  For pediatric follow-up appointments, call:  [phone]      NOTE: This note is not finalized until attending reviews and signs.

## 2024-05-29 NOTE — PROGRESS NOTES
Subjective   Patient ID: Saskia Koehler is a 64 y.o. female.    Chief Complaint    Post-op Follow-up       HPI       Post-op Follow-up    In right eye.  Discomfort includes floaters.  Vision is blurred at distance.             Comments    Patient presents for POV Vitrectomy Pars Plana with cataract extraction and secondary IOL placement OD.  She states her eye is very uncomfortable OD. Denies any pain.no flashes,          Last edited by LAMONT Canseco on 5/30/2024  1:08 PM.          Current Outpatient Medications (Ophthalmology pharm classes)   Medication Sig Dispense Refill    moxifloxacin (Vigamox) 0.5 % ophthalmic solution Administer 1 drop into the right eye 4 times a day for 7 days. 5 mL 1    ofloxacin (Ocuflox) 0.3 % ophthalmic solution Administer 1 drop into the right eye 4 times a day for 7 days. 5 mL 0    prednisoLONE acetate (Pred-Forte) 1 % ophthalmic suspension Administer 1 drop into the right eye 4 times a day for 14 days. 5 mL 1     Current Outpatient Medications (Other)   Medication Sig Dispense Refill    sod picosulf-mag ox-citric ac (Clenpiq) 10 mg-3.5 gram- 12 gram/160 mL solution Take by mouth.         Objective   Base Eye Exam       Visual Acuity (Snellen - Linear)         Right Left    Dist sc 20/70-2     Dist ph sc 20/25               Tonometry (Goldmann Applanation, 1:13 PM)         Right Left    Pressure 12               Pupils         Pupils    Right PERRL, No APD    Left PERRL, No APD              Neuro/Psych       Oriented x3: Yes    Mood/Affect: Normal                  Slit Lamp and Fundus Exam       External Exam         Right Left    External Normal Normal              Slit Lamp Exam         Right Left    Lids/Lashes Normal Normal    Conjunctiva/Sclera resolving PK, haptics in place and visible under conj White and quiet    Cornea Clear Clear    Anterior Chamber trace cell/flare Deep and quiet    Iris Round and reactive Round and reactive    Lens Scleral fixated IOL in  good position Clear    Anterior Vitreous Normal Normal              Fundus Exam         Right Left    Disc Normal     C/D Ratio 0.3     Macula Normal     Vessels Normal     Periphery Normal                     Assessment/Plan   Zonular dehiscence  Intraocular foreign body of right eye, initial encounter  -     CT orbit wo IV contrast; IMPRESSION: Unremarkable CT appearance of the orbits. No radiopaque foreign body.  Right eye injury, initial encounter  Vitreous prolapse of right eye  - patient presented as a referral from outside provider (Dr. Bello Shah) for eye evaluation after trauma while removing nails from a 2x4 piece of wood  - was noted to have zonular dehiscence superotemporally with vitroeus prolapsing into the anterior chamber (AC)  - CT with no foreign body (FB) noted  -Now status post (s/p) pars plana vitrectomy (PPV)/secondary intraocular lens (IOL) placement (Yamane technique) right eye 5/24/24  -Reports a gritty discomfort that temporarily relieves with drops  -PF QID and moxi QID  -Doing well, intraocular pressure (IOP) appears well centered and flat, haptics visible and in good positon under conj, BCVA with PH today 20/25, retina in place   -Continue moxi for 1 more day, then stop  -Start PF taper in one day, TID x 1 week, BID x 1 week, daily x 1 week, then stop  -F/up 3-4 weeks, sooner PRN

## 2024-05-30 ENCOUNTER — OFFICE VISIT (OUTPATIENT)
Dept: OPHTHALMOLOGY | Facility: CLINIC | Age: 64
End: 2024-05-30
Payer: MEDICARE

## 2024-05-30 DIAGNOSIS — H27.8 ZONULAR DEHISCENCE: ICD-10-CM

## 2024-05-30 DIAGNOSIS — S05.91XD RIGHT EYE INJURY, SUBSEQUENT ENCOUNTER: ICD-10-CM

## 2024-05-30 DIAGNOSIS — H43.01 VITREOUS PROLAPSE OF RIGHT EYE: ICD-10-CM

## 2024-05-30 DIAGNOSIS — H26.101 TRAUMATIC CATARACT OF RIGHT EYE, UNSPECIFIED TRAUMATIC CATARACT TYPE: Primary | ICD-10-CM

## 2024-05-30 PROCEDURE — 99024 POSTOP FOLLOW-UP VISIT: CPT | Performed by: OPHTHALMOLOGY

## 2024-05-30 ASSESSMENT — SLIT LAMP EXAM - LIDS
COMMENTS: NORMAL
COMMENTS: NORMAL

## 2024-05-30 ASSESSMENT — CUP TO DISC RATIO: OD_RATIO: 0.3

## 2024-05-30 ASSESSMENT — VISUAL ACUITY
METHOD: SNELLEN - LINEAR
OD_SC: 20/70-2
OD_PH_SC: 20/25

## 2024-05-30 ASSESSMENT — EXTERNAL EXAM - RIGHT EYE: OD_EXAM: NORMAL

## 2024-05-30 ASSESSMENT — TONOMETRY
IOP_METHOD: GOLDMANN APPLANATION
OD_IOP_MMHG: 12

## 2024-05-30 ASSESSMENT — EXTERNAL EXAM - LEFT EYE: OS_EXAM: NORMAL

## 2024-06-30 NOTE — PROGRESS NOTES
Subjective   Patient ID: Saskia Koehler is a 64 y.o. female.    Chief Complaint    Follow-up         HPI    64 Year old female presents for 4 week fuv, Vitreous prolapse of right eye. She is using PF qday. She states vision is improving but is still blurry, She states she had an episode last Wednesday that her whole right eye was painful and headache, but resolved by the next day. She has flashes and floaters, mostly stable, she has more floaters than before. She states sometimes in her right eye she'll see a visual disturbance that is similar to old CRT TVs shaking before turning off. She states it does not happen often and has no specific trigger.   Last edited by LAMONT Stern on 7/1/2024  8:10 AM.            No current outpatient medications on file. (Ophthalmology pharm classes)       Current Outpatient Medications (Other)   Medication Sig Dispense Refill    sod picosulf-mag ox-citric ac (Clenpiq) 10 mg-3.5 gram- 12 gram/160 mL solution Take by mouth.         Objective   Base Eye Exam       Visual Acuity (Snellen - Linear)         Right Left    Dist sc 20/100 -2     Dist cc  20/40 -2    Dist ph sc 20/50 +2               Tonometry (Goldmann Applanation, 8:15 AM)         Right Left    Pressure 10 16              Pupils         Pupils React    Right PERRL, No APD Minimal    Left PERRL, No APD Brisk              Visual Fields         Left Right     Full Full              Extraocular Movement         Right Left     Full Full              Neuro/Psych       Oriented x3: Yes    Mood/Affect: Normal              Dilation       Both eyes: 1% Mydriacyl & 2.5% Winston  @ 8:11 AM                  Slit Lamp and Fundus Exam       External Exam         Right Left    External Normal Normal              Slit Lamp Exam         Right Left    Lids/Lashes Normal Normal    Conjunctiva/Sclera resolving PK, haptics in place and visible under conj White and quiet    Cornea tr edema Clear    Anterior Chamber 2+ cell Deep and  quiet    Iris Round and reactive Round and reactive    Lens Scleral fixated IOL in good position Clear    Anterior Vitreous Normal Normal              Fundus Exam         Right Left    Disc Normal     C/D Ratio 0.3     Macula Normal     Vessels Normal     Periphery Normal                   Imaging    Macula OCT 07/01/24  Right eye (OD): Normal retinal appearance, no IRF/subretinal fluid (SRF), trace choroidal folds  Left eye (OS): Normal contour and appearance, no IRF/subretinal fluid (SRF)    Assessment/Plan   Zonular dehiscence  Intraocular foreign body of right eye, initial encounter  -     CT orbit wo IV contrast; IMPRESSION: Unremarkable CT appearance of the orbits. No radiopaque foreign body.  Right eye injury, initial encounter  Vitreous prolapse of right eye  - patient presented as a referral from outside provider (Dr. Bello Shah) for eye evaluation after trauma while removing nails from a 2x4 piece of wood  - was noted to have zonular dehiscence superotemporally with vitroeus prolapsing into the anterior chamber (AC)  - CT with no foreign body (FB) noted  -Now status post (s/p) pars plana vitrectomy (PPV)/secondary intraocular lens (IOL) placement (Yamane technique) right eye 5/24/24  -Doing well, intraocular pressure (IOP) appears well centered and flat, haptics visible and in good positon under conj  - BCVA with PH today 20/50, decreased from last visit  - exam with more cell now and some corneal edema, likely rebound iritis from Pred taper  - Restart Pred QID  -F/up 3-4 weeks, sooner PRN

## 2024-07-01 ENCOUNTER — APPOINTMENT (OUTPATIENT)
Dept: OPHTHALMOLOGY | Facility: CLINIC | Age: 64
End: 2024-07-01
Payer: MEDICARE

## 2024-07-01 DIAGNOSIS — H35.372 EPIRETINAL MEMBRANE (ERM) OF LEFT EYE: ICD-10-CM

## 2024-07-01 DIAGNOSIS — H43.01 VITREOUS PROLAPSE OF RIGHT EYE: Primary | ICD-10-CM

## 2024-07-01 DIAGNOSIS — H26.101 TRAUMATIC CATARACT OF RIGHT EYE, UNSPECIFIED TRAUMATIC CATARACT TYPE: ICD-10-CM

## 2024-07-01 DIAGNOSIS — H27.8 ZONULAR DEHISCENCE: ICD-10-CM

## 2024-07-01 PROCEDURE — 92134 CPTRZ OPH DX IMG PST SGM RTA: CPT | Performed by: OPHTHALMOLOGY

## 2024-07-01 PROCEDURE — 99024 POSTOP FOLLOW-UP VISIT: CPT | Performed by: OPHTHALMOLOGY

## 2024-07-01 ASSESSMENT — SLIT LAMP EXAM - LIDS
COMMENTS: NORMAL
COMMENTS: NORMAL

## 2024-07-01 ASSESSMENT — ENCOUNTER SYMPTOMS
HEMATOLOGIC/LYMPHATIC NEGATIVE: 0
EYES NEGATIVE: 0
MUSCULOSKELETAL NEGATIVE: 0
RESPIRATORY NEGATIVE: 0
CARDIOVASCULAR NEGATIVE: 0
ALLERGIC/IMMUNOLOGIC NEGATIVE: 0
CONSTITUTIONAL NEGATIVE: 0
PSYCHIATRIC NEGATIVE: 0
NEUROLOGICAL NEGATIVE: 0
ENDOCRINE NEGATIVE: 0
GASTROINTESTINAL NEGATIVE: 0

## 2024-07-01 ASSESSMENT — VISUAL ACUITY
OD_SC+: -2
METHOD: SNELLEN - LINEAR
OS_CC: 20/40
OD_PH_SC+: +2
OD_PH_SC: 20/50
OD_SC: 20/100
OS_CC+: -2

## 2024-07-01 ASSESSMENT — TONOMETRY
IOP_METHOD: GOLDMANN APPLANATION
OD_IOP_MMHG: 10
OS_IOP_MMHG: 16

## 2024-07-01 ASSESSMENT — EXTERNAL EXAM - RIGHT EYE: OD_EXAM: NORMAL

## 2024-07-01 ASSESSMENT — CONF VISUAL FIELD
OD_SUPERIOR_TEMPORAL_RESTRICTION: 0
OD_SUPERIOR_NASAL_RESTRICTION: 0
OD_NORMAL: 1
OS_SUPERIOR_NASAL_RESTRICTION: 0
OS_INFERIOR_NASAL_RESTRICTION: 0
OD_INFERIOR_NASAL_RESTRICTION: 0
OS_NORMAL: 1
OD_INFERIOR_TEMPORAL_RESTRICTION: 0
OS_SUPERIOR_TEMPORAL_RESTRICTION: 0
OS_INFERIOR_TEMPORAL_RESTRICTION: 0

## 2024-07-01 ASSESSMENT — EXTERNAL EXAM - LEFT EYE: OS_EXAM: NORMAL

## 2024-07-01 ASSESSMENT — CUP TO DISC RATIO: OD_RATIO: 0.3

## 2024-07-16 ENCOUNTER — HOSPITAL ENCOUNTER (OUTPATIENT)
Facility: HOSPITAL | Age: 64
Setting detail: OUTPATIENT SURGERY
End: 2024-07-16
Attending: OPHTHALMOLOGY | Admitting: OPHTHALMOLOGY
Payer: MEDICARE

## 2024-07-16 ENCOUNTER — PREP FOR PROCEDURE (OUTPATIENT)
Dept: OPHTHALMOLOGY | Facility: CLINIC | Age: 64
End: 2024-07-16
Payer: MEDICARE

## 2024-07-16 ENCOUNTER — PREP FOR PROCEDURE (OUTPATIENT)
Dept: OPHTHALMOLOGY | Facility: HOSPITAL | Age: 64
End: 2024-07-16
Payer: MEDICARE

## 2024-07-16 ENCOUNTER — DOCUMENTATION (OUTPATIENT)
Dept: OPHTHALMOLOGY | Facility: CLINIC | Age: 64
End: 2024-07-16
Payer: MEDICARE

## 2024-07-16 DIAGNOSIS — H40.31X4: Primary | ICD-10-CM

## 2024-07-16 DIAGNOSIS — H33.001 MACULA-ON RHEGMATOGENOUS RETINAL DETACHMENT OF RIGHT EYE: Primary | ICD-10-CM

## 2024-07-16 DIAGNOSIS — H33.21 RIGHT RETINAL DETACHMENT: Primary | ICD-10-CM

## 2024-07-16 RX ORDER — TROPICAMIDE 10 MG/ML
1 SOLUTION/ DROPS OPHTHALMIC
OUTPATIENT
Start: 2024-07-17 | End: 2024-07-17

## 2024-07-16 RX ORDER — TETRACAINE HYDROCHLORIDE 5 MG/ML
1 SOLUTION OPHTHALMIC ONCE
OUTPATIENT
Start: 2024-07-17

## 2024-07-16 RX ORDER — TETRACAINE HYDROCHLORIDE 5 MG/ML
1 SOLUTION OPHTHALMIC ONCE
Status: CANCELLED | OUTPATIENT
Start: 2024-07-17

## 2024-07-16 RX ORDER — TROPICAMIDE 10 MG/ML
1 SOLUTION/ DROPS OPHTHALMIC
Status: CANCELLED | OUTPATIENT
Start: 2024-07-17 | End: 2024-07-17

## 2024-07-16 RX ORDER — DORZOLAMIDE HYDROCHLORIDE AND TIMOLOL MALEATE 20; 5 MG/ML; MG/ML
1 SOLUTION/ DROPS OPHTHALMIC 2 TIMES DAILY
Qty: 10 ML | Refills: 1 | Status: SHIPPED | OUTPATIENT
Start: 2024-07-16 | End: 2024-08-15

## 2024-07-16 RX ORDER — BRIMONIDINE TARTRATE 2 MG/ML
1 SOLUTION/ DROPS OPHTHALMIC 2 TIMES DAILY
Qty: 10 ML | Refills: 1 | Status: SHIPPED | OUTPATIENT
Start: 2024-07-16 | End: 2024-08-15

## 2024-07-16 RX ORDER — PHENYLEPHRINE HYDROCHLORIDE 25 MG/ML
1 SOLUTION/ DROPS OPHTHALMIC
Status: CANCELLED | OUTPATIENT
Start: 2024-07-17 | End: 2024-07-17

## 2024-07-16 RX ORDER — PHENYLEPHRINE HYDROCHLORIDE 25 MG/ML
1 SOLUTION/ DROPS OPHTHALMIC
OUTPATIENT
Start: 2024-07-17 | End: 2024-07-17

## 2024-07-16 ASSESSMENT — CUP TO DISC RATIO
OD_RATIO: 0.3
OS_RATIO: 0.3

## 2024-07-16 ASSESSMENT — SLIT LAMP EXAM - LIDS
COMMENTS: NORMAL
COMMENTS: NORMAL

## 2024-07-16 ASSESSMENT — GONIOSCOPY
OD_NASAL: SS
OD_TEMPORAL: SS
OD_SUPERIOR: TM

## 2024-07-16 ASSESSMENT — VISUAL ACUITY
OD_SC: 20/200
OS_CC+: -2
OS_CC: 20/25
METHOD: SNELLEN - LINEAR

## 2024-07-16 ASSESSMENT — CONF VISUAL FIELD
OS_SUPERIOR_NASAL_RESTRICTION: 0
OS_SUPERIOR_TEMPORAL_RESTRICTION: 0
OS_NORMAL: 1
OS_INFERIOR_NASAL_RESTRICTION: 0
OD_INFERIOR_NASAL_RESTRICTION: 1
OS_INFERIOR_TEMPORAL_RESTRICTION: 0
OD_SUPERIOR_NASAL_RESTRICTION: 1

## 2024-07-16 ASSESSMENT — TONOMETRY
OD_IOP_MMHG: 34
OS_IOP_MMHG: 16
IOP_METHOD: GOLDMANN APPLANATION
OD_IOP_MMHG: 19

## 2024-07-16 ASSESSMENT — EXTERNAL EXAM - RIGHT EYE: OD_EXAM: NORMAL

## 2024-07-16 ASSESSMENT — EXTERNAL EXAM - LEFT EYE: OS_EXAM: NORMAL

## 2024-07-16 NOTE — PROGRESS NOTES
Reason for consult:    HPI: 65yo female with past ocular hx of trauma while removing nails from a 2x4 piece of wood, noted to have zonular dehicense, now s/p PPV/SFIOL (Prasad) on 5/24/24 now presenting with macula-splitting retinal detachment of the right eye.     Reports curtain over vision started 3-4 days ago. Cannot see out of nasal visual field.     Past Ocular History: As above  Past Medical History: as above  Family History: reviewed and not pertinent to chief complaint  Medications: please refer to medication reconciliation  Allergies: please refer to patient allergy list    Base Eye Exam       Visual Acuity (Snellen - Linear)         Right Left    Dist sc 20/200     Dist cc  20/25 -2              Tonometry (Goldmann Applanation, 2:47 PM)         Right Left    Pressure 34 16              Tonometry #2 (Tonopen after 3x cosopt/brim, 3:50 PM)         Right Left    Pressure 19               Gonioscopy         Right Left    Temporal SS     Nasal SS     Superior TM     Inferior Angle recession?               Pupils         Dark Light Shape React APD    Right 7 7 Round Pharmacolgoically dilatedacologically dilated none by reverse    Left 4 2 Round Brisk None              Visual Fields         Left Right     Full                                 Extraocular Movement         Right Left     Full Full              Neuro/Psych       Oriented x3: Yes    Mood/Affect: Normal                  Slit Lamp and Fundus Exam       External Exam         Right Left    External Normal Normal              Slit Lamp Exam         Right Left    Lids/Lashes Normal Normal    Conjunctiva/Sclera resolving PK, haptics in place and visible under conj White and quiet    Cornea tr edema Clear    Anterior Chamber 2+ cell Deep and quiet    Iris Round and reactive Round and reactive    Lens Scleral fixated IOL in good position Clear    Anterior Vitreous Tr inferior vit heme at vit base Normal              Fundus Exam         Right Left    Disc  Normal Normal    C/D Ratio 0.3 0.3    Macula Macula splitting retina detachment Normal    Vessels Normal Normal    Periphery Small HST at 10oc with fluid spanning temporal retina from 6oc to 12oc, tear at 7oc Normal                     A&P:    #Macula-splitting retina detachment, OD  - Pt with POHx of trauma to right eye, s/p SFIOL 5/24/24 presenting with curtain over nasal aspect of vision x3-4 days.   - Exam today with macula-splitting retina detachment with tears at 7oc and 10oc, inferior vit heme, and fluid spanning temporal retina from 6oc to 12oc.   - OCT mac and fundus photos obtained today.   - Discussed with pt option of PPV vs pneumatic, however given inferior position of tear, deferred pneumatic retinopexy.     Recommendations:   - NPO at midnight  - Plan for PPV/endodrainage/air-fluid exchange/endolaser/gas vs silicon oil tamponade tomorrow with Dr. Bhakta  - Case request placed, consent completed    #Elevated IOP, OD  - Elevated IOP OD at 36 --> 19 after 3x cosopt/brimonidine  - Gonioscopy today with evidence of angle recession OD vs elevated IOP due to closure of traumatic cyclodialysis cleft    Recommendations:   - Start cosopt BID right eye  - Start brimonidine BID right eye     #S/p PPV/Yamane 5/24/24   - Continue Pred qid     Pt's preferred contact info: [phone]    Patient examined and discussed with Dr. Corral.     Jillian Valadez MD  Ophthalmology, PGY3    Note not final until signed by attending physician    Ophthalmology Adult Pager: [phone]  Ophthalmology Peds Pager: [phone]    For adult follow up appts, call [phone]  For pediatric follow up appts, call [phone]

## 2024-07-16 NOTE — H&P
History Of Present Illness  Saskia Koehler is a 64 y.o. 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 This has caused visual decline.     Past Medical History  She has a past medical history of Corneal abrasion, Diverticulosis, Eye trauma, and Nephrolithiasis.    Surgical History  She has a past surgical history that includes Back surgery (01/30/2014); Back surgery (Bilateral); Colonoscopy; and Cataract extraction.     Social History  She reports that she has never smoked. She has never been exposed to tobacco smoke. She has never used smokeless tobacco. She reports current alcohol use of about 5.0 standard drinks of alcohol per week. Drug use questions deferred to the physician.     Allergies  Morphine    ROS  Patient denies ocular pain, redness, discharge, decreased vision, double vision, blind spots, flashes, or floaters.     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  -     Case Request Operating Room: Pars Plana Vitrectomy, Endodrainage, Air-fluid Exchange, Endolaser, Gas vs Silicone Oil Tamponade - Right; Standing  Other orders  -     phenylephrine (Mydfrin) 2.5 % ophthalmic solution 1 drop  -     tropicamide (Mydriacyl) 1 % ophthalmic solution 1 drop  -     Place in outpatient/hospital ambulatory surgery; Standing  -     Full code; Standing  -     tetracaine (PF) 0.5 % ophthalmic solution 1 drop    Will plan to proceed with Pars Plana Vitrectomy, Endodrainage, Air-fluid Exchange, Endolaser, Gas vs Silicone Oil Tamponade - Right Eye       I spent 10 minutes in the professional and overall care of this patient.      Shukri Corral MD

## 2024-07-17 ENCOUNTER — ANESTHESIA (OUTPATIENT)
Dept: OPERATING ROOM | Facility: CLINIC | Age: 64
End: 2024-07-17
Payer: MEDICARE

## 2024-07-17 ENCOUNTER — ANESTHESIA EVENT (OUTPATIENT)
Dept: OPERATING ROOM | Facility: CLINIC | Age: 64
End: 2024-07-17
Payer: MEDICARE

## 2024-07-17 ENCOUNTER — HOSPITAL ENCOUNTER (OUTPATIENT)
Facility: CLINIC | Age: 64
Setting detail: OUTPATIENT SURGERY
Discharge: HOME | End: 2024-07-17
Attending: OPHTHALMOLOGY | Admitting: OPHTHALMOLOGY
Payer: MEDICARE

## 2024-07-17 VITALS
HEIGHT: 64 IN | RESPIRATION RATE: 16 BRPM | OXYGEN SATURATION: 97 % | TEMPERATURE: 97.9 F | WEIGHT: 165.12 LBS | DIASTOLIC BLOOD PRESSURE: 67 MMHG | BODY MASS INDEX: 28.19 KG/M2 | HEART RATE: 53 BPM | SYSTOLIC BLOOD PRESSURE: 130 MMHG

## 2024-07-17 DIAGNOSIS — H33.001 MACULA-ON RHEGMATOGENOUS RETINAL DETACHMENT OF RIGHT EYE: Primary | ICD-10-CM

## 2024-07-17 DIAGNOSIS — H33.022 RETINAL DETACHMENT OF LEFT EYE WITH MULTIPLE BREAKS: ICD-10-CM

## 2024-07-17 PROCEDURE — 2500000001 HC RX 250 WO HCPCS SELF ADMINISTERED DRUGS (ALT 637 FOR MEDICARE OP): Performed by: STUDENT IN AN ORGANIZED HEALTH CARE EDUCATION/TRAINING PROGRAM

## 2024-07-17 PROCEDURE — 3700000002 HC GENERAL ANESTHESIA TIME - EACH INCREMENTAL 1 MINUTE: Performed by: OPHTHALMOLOGY

## 2024-07-17 PROCEDURE — 2500000004 HC RX 250 GENERAL PHARMACY W/ HCPCS (ALT 636 FOR OP/ED): Performed by: OPHTHALMOLOGY

## 2024-07-17 PROCEDURE — 2500000004 HC RX 250 GENERAL PHARMACY W/ HCPCS (ALT 636 FOR OP/ED): Performed by: ANESTHESIOLOGIST ASSISTANT

## 2024-07-17 PROCEDURE — 3600000003 HC OR TIME - INITIAL BASE CHARGE - PROCEDURE LEVEL THREE: Performed by: OPHTHALMOLOGY

## 2024-07-17 PROCEDURE — A67108 PR REPAIR DETACH RETINA,W VITRECTOMY: Performed by: ANESTHESIOLOGIST ASSISTANT

## 2024-07-17 PROCEDURE — 2720000007 HC OR 272 NO HCPCS: Performed by: OPHTHALMOLOGY

## 2024-07-17 PROCEDURE — A67108 PR REPAIR DETACH RETINA,W VITRECTOMY: Performed by: ANESTHESIOLOGY

## 2024-07-17 PROCEDURE — 7100000009 HC PHASE TWO TIME - INITIAL BASE CHARGE: Performed by: OPHTHALMOLOGY

## 2024-07-17 PROCEDURE — 7100000010 HC PHASE TWO TIME - EACH INCREMENTAL 1 MINUTE: Performed by: OPHTHALMOLOGY

## 2024-07-17 PROCEDURE — 67108 REPAIR DETACHED RETINA: CPT | Performed by: STUDENT IN AN ORGANIZED HEALTH CARE EDUCATION/TRAINING PROGRAM

## 2024-07-17 PROCEDURE — 2500000001 HC RX 250 WO HCPCS SELF ADMINISTERED DRUGS (ALT 637 FOR MEDICARE OP): Performed by: OPHTHALMOLOGY

## 2024-07-17 PROCEDURE — 3600000008 HC OR TIME - EACH INCREMENTAL 1 MINUTE - PROCEDURE LEVEL THREE: Performed by: OPHTHALMOLOGY

## 2024-07-17 PROCEDURE — 3700000001 HC GENERAL ANESTHESIA TIME - INITIAL BASE CHARGE: Performed by: OPHTHALMOLOGY

## 2024-07-17 PROCEDURE — 2500000005 HC RX 250 GENERAL PHARMACY W/O HCPCS: Performed by: OPHTHALMOLOGY

## 2024-07-17 PROCEDURE — 67108 REPAIR DETACHED RETINA: CPT | Performed by: OPHTHALMOLOGY

## 2024-07-17 PROCEDURE — 2500000005 HC RX 250 GENERAL PHARMACY W/O HCPCS: Performed by: STUDENT IN AN ORGANIZED HEALTH CARE EDUCATION/TRAINING PROGRAM

## 2024-07-17 DEVICE — IMPLANTABLE DEVICE: Type: IMPLANTABLE DEVICE | Site: EYE | Status: FUNCTIONAL

## 2024-07-17 RX ORDER — TROPICAMIDE 10 MG/ML
1 SOLUTION/ DROPS OPHTHALMIC
Status: COMPLETED | OUTPATIENT
Start: 2024-07-17 | End: 2024-07-17

## 2024-07-17 RX ORDER — TRIAMCINOLONE ACETONIDE 40 MG/ML
INJECTION, SUSPENSION INTRA-ARTICULAR; INTRAMUSCULAR AS NEEDED
Status: DISCONTINUED | OUTPATIENT
Start: 2024-07-17 | End: 2024-07-17 | Stop reason: HOSPADM

## 2024-07-17 RX ORDER — PHENYLEPHRINE HYDROCHLORIDE 25 MG/ML
1 SOLUTION/ DROPS OPHTHALMIC
Status: COMPLETED | OUTPATIENT
Start: 2024-07-17 | End: 2024-07-17

## 2024-07-17 RX ORDER — POVIDONE-IODINE 5 %
SOLUTION, NON-ORAL OPHTHALMIC (EYE) AS NEEDED
Status: DISCONTINUED | OUTPATIENT
Start: 2024-07-17 | End: 2024-07-17 | Stop reason: HOSPADM

## 2024-07-17 RX ORDER — PROPOFOL 10 MG/ML
INJECTION, EMULSION INTRAVENOUS AS NEEDED
Status: DISCONTINUED | OUTPATIENT
Start: 2024-07-17 | End: 2024-07-17

## 2024-07-17 RX ORDER — TETRACAINE HYDROCHLORIDE 5 MG/ML
1 SOLUTION OPHTHALMIC ONCE
Status: COMPLETED | OUTPATIENT
Start: 2024-07-17 | End: 2024-07-17

## 2024-07-17 RX ORDER — PREDNISOLONE ACETATE 10 MG/ML
1 SUSPENSION/ DROPS OPHTHALMIC 4 TIMES DAILY
Qty: 5 ML | Refills: 3 | Status: SHIPPED | OUTPATIENT
Start: 2024-07-17 | End: 2024-08-16

## 2024-07-17 RX ORDER — OFLOXACIN 3 MG/ML
1 SOLUTION/ DROPS OPHTHALMIC 4 TIMES DAILY
Qty: 5 ML | Refills: 0 | Status: SHIPPED | OUTPATIENT
Start: 2024-07-17 | End: 2024-07-24

## 2024-07-17 RX ORDER — BUPIVACAINE HYDROCHLORIDE 7.5 MG/ML
INJECTION, SOLUTION EPIDURAL; RETROBULBAR AS NEEDED
Status: DISCONTINUED | OUTPATIENT
Start: 2024-07-17 | End: 2024-07-17 | Stop reason: HOSPADM

## 2024-07-17 RX ORDER — PREDNISOLONE ACETATE 10 MG/ML
1 SUSPENSION/ DROPS OPHTHALMIC 4 TIMES DAILY
Status: ON HOLD | COMMUNITY
End: 2024-07-17

## 2024-07-17 RX ORDER — MIDAZOLAM HYDROCHLORIDE 1 MG/ML
INJECTION, SOLUTION INTRAMUSCULAR; INTRAVENOUS AS NEEDED
Status: DISCONTINUED | OUTPATIENT
Start: 2024-07-17 | End: 2024-07-17

## 2024-07-17 RX ORDER — WATER 1 ML/ML
IRRIGANT IRRIGATION AS NEEDED
Status: DISCONTINUED | OUTPATIENT
Start: 2024-07-17 | End: 2024-07-17 | Stop reason: HOSPADM

## 2024-07-17 SDOH — HEALTH STABILITY: MENTAL HEALTH: CURRENT SMOKER: 0

## 2024-07-17 ASSESSMENT — PAIN SCALES - GENERAL
PAINLEVEL_OUTOF10: 0 - NO PAIN
PAIN_LEVEL: 0

## 2024-07-17 ASSESSMENT — COLUMBIA-SUICIDE SEVERITY RATING SCALE - C-SSRS
1. IN THE PAST MONTH, HAVE YOU WISHED YOU WERE DEAD OR WISHED YOU COULD GO TO SLEEP AND NOT WAKE UP?: NO
6. HAVE YOU EVER DONE ANYTHING, STARTED TO DO ANYTHING, OR PREPARED TO DO ANYTHING TO END YOUR LIFE?: NO
2. HAVE YOU ACTUALLY HAD ANY THOUGHTS OF KILLING YOURSELF?: NO

## 2024-07-17 ASSESSMENT — PAIN - FUNCTIONAL ASSESSMENT
PAIN_FUNCTIONAL_ASSESSMENT: 0-10

## 2024-07-17 NOTE — ANESTHESIA PREPROCEDURE EVALUATION
Patient: Saskia Koehler    Procedure Information       Date/Time: 07/17/24 1230    Procedure: Pars Plana Vitrectomy, Endodrainage, Air-fluid Exchange, Endolaser, Gas vs Silicone Oil Tamponade - Right (Right) - with retrobulbar block - right eye    Location: [facility] [place] [facility] OR 03 / Virtual [facility] [place] [facility] OR    Surgeons: Fabrizio Bhakta MD            Relevant Problems   Anesthesia (within normal limits)      Cardiac (within normal limits)      Pulmonary (within normal limits)      Neuro (within normal limits)      GI (within normal limits)      /Renal (within normal limits)      Liver (within normal limits)      Endocrine (within normal limits)      Hematology (within normal limits)      Musculoskeletal (within normal limits)       Clinical information reviewed:   Tobacco  Allergies  Meds   Med Hx  Surg Hx   Fam Hx  Soc Hx        NPO Detail:  NPO/Void Status  Date of Last Liquid: 07/16/24  Time of Last Liquid: 1830  Date of Last Solid: 07/16/24  Time of Last Solid: 1830  Last Intake Type: Clear fluids  Time of Last Void: 1140         Physical Exam    Airway  Mallampati: I  TM distance: >3 FB  Neck ROM: full     Cardiovascular - normal exam     Dental    Pulmonary - normal exam     Abdominal - normal exam           Anesthesia Plan    History of general anesthesia?: yes  History of complications of general anesthesia?: no    ASA 1     MAC     The patient is not a current smoker.    Anesthetic plan and risks discussed with patient.    Plan discussed with CRNA and CAA.

## 2024-07-17 NOTE — BRIEF OP NOTE
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 [H33.001]     * Retinal detachment of left eye with multiple breaks [H33.022]     Procedures  Pars Plana Vitrectomy, Endodrainage, Air-fluid Exchange, Endolaser, Gas vs Silicone Oil Tamponade - Right  03281 - [place] RPR RETINAL DTCHMNT W/VITRECTOMY ANY METH      Surgeons      * Fabrizio Bhakta - Primary    Resident/Fellow/Other Assistant:  Surgeons and Role:     * Shukri Corral MD - Assisting    Procedure Summary  Anesthesia: Monitor Anesthesia Care  ASA: I  Anesthesia Staff: Anesthesiologist: Kala Goss MD  C-AA: INOCENCIO Luna  Estimated Blood Loss: 0 mL  Intra-op Medications:   Administrations occurring from 1131 to 1301 on 24:   Medication Name Total Dose   bupivacaine PF (Marcaine) injection 0.75 % 4 mL   povidone-iodine 5 % ophthalmic solution 1 Application   triamcinolone acetonide (Kenalog-40) injection 40 mg   balanced salts (BSS Plus) intraocular solution 500 mL   sterile water irrigation solution 100 mL   chondroitin sulf-sod hyaluron (Viscoat) intraocular injection 1 mL   phenylephrine (Mydfrin) 2.5 % ophthalmic solution 1 drop 3 drop   tetracaine (PF) 0.5 % ophthalmic solution 1 drop 1 drop   tropicamide (Mydriacyl) 1 % ophthalmic solution 1 drop 3 drop          Anesthesia Record               Intraprocedure I/O Totals       None           Specimen: No specimens collected     Staff:   Circulator: Felicia Cat Person: Shruti    Findings: macula on (fovea splitting) rhegmatogenous retinal detachment with multiple breaks - right eye    Complications:  None; patient tolerated the procedure well.     Disposition: PACU - hemodynamically stable.  Condition: stable  Specimens Collected:  No specimens collected    Attending Attestation:     A qualified resident physician was not available and the use of a skilled assistant surgeon, Shukri Corral MD, was required for the following portions of this case: Pars Plana Vitrectomy, Endodrainage, Air-fluid Exchange, Endolaser, Gas vs Silicone Oil Tamponade - Right Eye      Fabrizio PUCKETT Livia  Phone Number: [phone]   37.2

## 2024-07-17 NOTE — DISCHARGE INSTRUCTIONS
Please keep the eye patched/shielded until your post op day 1 appointment tomorrow.    Appointment: [facility], Fabrizio Bhakta MD - PLEASE REPORT TO CLINIC AT 10AM     Please do not perform any strenuous activity, bending below the waist, until you are cleared by your doctor.      Post Operative Medications: You will start your eye drops tomorrow after your post operative day 1 appointment   Antibiotic (Tan Cap) - 4 times per day              Steroid (Pink Cap) - 4 times per day   The order of drop instillation does not matter but you must wait atleast 5 minutes in between drops to ensure that the medications absorb properly     You have gas in the the eye after surgery. You are not permitted to fly in airplanes or experience any significant change in elevation until your are cleared by your doctor. You must wear your gas bracelet until your are cleared by your doctor.      Positioning: Please maintain strict face down positioning during the day for the next 3 days. You may take one 10-15 break per hour to sit upright/stand to eat and use the bathroom. When sitting or standing upright, look down at the floor to maintain facedown positioning.  At night, lay on your left side with  your face turned down and into the pillow. DO NOT LIE FLAT ON YOUR BACK

## 2024-07-17 NOTE — ANESTHESIA POSTPROCEDURE EVALUATION
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 [H33.001])    Surgeons: Fabrizio Bhakta MD Responsible Provider: Kala Goss MD    Anesthesia Type: MAC ASA Status: 1            Anesthesia Type: MAC    Vitals Value Taken Time   /67 07/17/24 1428   Temp 36.6 °C (97.9 °F) 07/17/24 1428   Pulse 53 07/17/24 1428   Resp 16 07/17/24 1428   SpO2 97 % 07/17/24 1428       Anesthesia Post Evaluation    Patient location during evaluation: PACU  Patient participation: complete - patient cannot participate  Level of consciousness: awake  Pain score: 0  Pain management: adequate  Airway patency: patent  Cardiovascular status: acceptable  Respiratory status: acceptable  Hydration status: acceptable  Postoperative Nausea and Vomiting: none        There were no known notable events for this encounter.

## 2024-07-17 NOTE — OP NOTE
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 [H33.001]     * Retinal detachment of left eye with multiple breaks [H33.022]     Procedures  Pars Plana Vitrectomy, Endodrainage, Air-fluid Exchange, Endolaser, Gas vs Silicone Oil Tamponade - Right  33730 - [place] RPR RETINAL DTCHMNT W/VITRECTOMY ANY METH    Surgeons      * Fabrizio Bhakta - Primary    Resident/Fellow/Other Assistant:  Surgeons and Role:     * Shukri Corral MD - Assisting    Procedure Summary  Anesthesia: Monitor Anesthesia Care  ASA: I  Anesthesia Staff: Anesthesiologist: Kala Goss MD  C-AA: INOCENCIO Luna  Estimated Blood Loss: 0 mL  Intra-op Medications:   Administrations occurring from 1131 to 1301 on 24:   Medication Name Total Dose   bupivacaine PF (Marcaine) injection 0.75 % 4 mL   povidone-iodine 5 % ophthalmic solution 1 Application   triamcinolone acetonide (Kenalog-40) injection 40 mg   balanced salts (BSS Plus) intraocular solution 500 mL   sterile water irrigation solution 100 mL   chondroitin sulf-sod hyaluron (Viscoat) intraocular injection 1 mL   phenylephrine (Mydfrin) 2.5 % ophthalmic solution 1 drop 3 drop   tetracaine (PF) 0.5 % ophthalmic solution 1 drop 1 drop   tropicamide (Mydriacyl) 1 % ophthalmic solution 1 drop 3 drop          Anesthesia Record               Intraprocedure I/O Totals       None           Specimen: No specimens collected     Staff:   Circulator: Felicia Leachub Person: Shruti         Drains and/or Catheters: * None in log *    Tourniquet Times: n/a        Implants:  Implants       Type Name Action Serial No.       Other CONSTELLATION, ISPAN C3F8 125 - [id number] Implanted             Findings: macula on (fovea splitting) rhegmatogenous retinal detachment with multiple breaks - right eye    Indications: Saskia Koehler is an 64 y.o. 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     The patient was seen in the preoperative area. The risks, benefits, complications, treatment options, non-operative alternatives, expected recovery and outcomes were discussed with the patient. The possibilities of reaction to medication, pulmonary aspiration, injury to surrounding structures, bleeding, recurrent infection, the need for additional procedures, failure to diagnose a condition, and creating a complication requiring transfusion or operation were discussed with the patient. The patient concurred with the proposed plan, giving informed consent.  The site of surgery was properly noted/marked if necessary per policy. The patient has been actively warmed in preoperative area. Preoperative antibiotics are not indicated. Venous thrombosis prophylaxis are not indicated.    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 Koehler is a 64 y.o. female with macula on (fovea splitting) rhegmatogenous retinal detachment with multiple breaks - right eye. This was causing decreased visual function. The risks, benefits, and alternatives to the procedure were discussed with the patient including the risk for vision loss, blindness, retinal detachment, infection, bleeding, pain, high or low pressure in the eye, double vision, no benefit, need for additional procedures, and droopy eyelid, amongst others. The patient had a full opportunity to have all questions answered in clinic prior to surgery. Afterwards, the patient requested that we perform surgery and signed the consent form.     PROCEDURE:   The patient was brought to the preoperative holding area where the correct eye was confirmed and marked. The patient was then brought to the operating room where a secondary time-out was performed to identify the correct patient, eye, procedure, and any allergies. The patient then underwent intravenous sedation by the anesthesia team followed by a block as described above. The eye was prepped and draped in the usual sterile ophthalmic fashion followed by placement of a lid speculum.     A 25-gauge trocar was placed in the inferotemporal quadrant 3.5 mm posterior to the limbus after conjunctival displacement.  A 4 mm infusion cannula was placed through this trocar, and the infusion cannula was confirmed in the vitreous cavity prior to turning it on. Two additional 25-gauge trocars were placed in a similar fashion in the superonasal and superotemporal quadrants 3.5 mm posterior to the limbus after conjunctival displacement. At this time, a standard three-port pars plana vitrectomy was performed using the light pipe, the cutter, and the BIOM viewing system. The retina was inspected and was found to be detached inferiorly and temporally from 6:00 - 11 oclock. The macula was partially detached  with a fovea splitting detachment. Five small retinal tears were noted posterior to the vitreous base from 7 to 11 o clock. The patient had prior vitrectomy.  A thorough peripheral vitreous dissection was performed. All vitreous traction was relieved around the identified tears. All the tears were demarcated with intravitreal diathermy.     A posterior drainage retinotomy was made within the inferotemporal midperiphery with intravitreal diathermy. Afterwards, a complete fluid-air exchange was performed using the soft tip cannula draining subretinal fluid through the retinotomy until the retina flatten. Residual fluid was removed from the posterior pole over the optic nerve using the soft tip cannula. The endolaser probe was brought onto the field and was used to perform laser at the edges of the posterior retinotomy, and 2-3 rows of barrier laser were applied around all 5 of the retinal tears. Laser Settings: Power - 300 mW, Duration 100ms, Interval: 150ms, Shot Count: 252, Total Energy: 14.38 Joules.     The remainder of the peripheral retina was inspected with scleral depression and was found to be attached without any retinal holes, breaks or fluid. The three trocar cannulas were sequentially removed and their respective sclerotomy sites were sutured with a single 7-0 vicryl suture. A two needle technique was then employed to perform the 13%  C3F8 gas exchange; the gas was injected into the posterior segment with a 30 gauge needle on a 60cc gas syringe and air was vented from the posterior segment via 30 gauge needle on a plunger less 3cc syringe filled with BSS. After completion of complete gas exchange both needles were removed from the globe simultaneously; there were no leaks. The eye was confirmed to be at a physiologic level by digital palpation after removal of the needles.      The lid speculum and drapes were removed. Antibiotic ointment was applied. The eye was patched and shielded. The patient  tolerated the procedure well without any intraoperative or immediate postoperative complications. The patient was taken to the recovery room in good condition. The patient was instructed to maintain a strict face-down position. The patient will follow up with Fabrizio Bhakta MD in clinic on the following morning.     Complications:  None; patient tolerated the procedure well.    Disposition: PACU - hemodynamically stable.  Condition: stable     Additional Details:     A qualified resident physician was not available and the use of a skilled assistant surgeon, Shukri Corral MD, was required for the following portions of this case: Pars Plana Vitrectomy, Endodrainage, Air-fluid Exchange, Endolaser, C3F8 Gas Tamponade - Right Eye    Attending Attestation:     Fabrizio Bhakta  Phone Number: [phone]

## 2024-07-18 ENCOUNTER — OFFICE VISIT (OUTPATIENT)
Dept: OPHTHALMOLOGY | Facility: CLINIC | Age: 64
End: 2024-07-18
Payer: MEDICARE

## 2024-07-18 DIAGNOSIS — H33.001 MACULA-ON RHEGMATOGENOUS RETINAL DETACHMENT OF RIGHT EYE: Primary | ICD-10-CM

## 2024-07-18 DIAGNOSIS — H26.131 TOTAL TRAUMATIC CATARACT, RIGHT EYE: ICD-10-CM

## 2024-07-18 PROCEDURE — 99024 POSTOP FOLLOW-UP VISIT: CPT | Performed by: OPHTHALMOLOGY

## 2024-07-18 ASSESSMENT — CUP TO DISC RATIO
OS_RATIO: 0.3
OD_RATIO: 0.3

## 2024-07-18 ASSESSMENT — SLIT LAMP EXAM - LIDS
COMMENTS: NORMAL
COMMENTS: NORMAL

## 2024-07-18 ASSESSMENT — VISUAL ACUITY
METHOD: SNELLEN - LINEAR
OD_SC: LP
OS_SC: 20/50-2

## 2024-07-18 ASSESSMENT — TONOMETRY
IOP_METHOD: TONOPEN
OD_IOP_MMHG: 11

## 2024-07-18 ASSESSMENT — EXTERNAL EXAM - LEFT EYE: OS_EXAM: NORMAL

## 2024-07-18 ASSESSMENT — EXTERNAL EXAM - RIGHT EYE: OD_EXAM: NORMAL

## 2024-07-18 NOTE — PROGRESS NOTES
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

## 2024-07-22 ENCOUNTER — APPOINTMENT (OUTPATIENT)
Dept: OPHTHALMOLOGY | Facility: CLINIC | Age: 64
End: 2024-07-22
Payer: MEDICARE

## 2024-07-25 ENCOUNTER — APPOINTMENT (OUTPATIENT)
Dept: OPHTHALMOLOGY | Facility: CLINIC | Age: 64
End: 2024-07-25
Payer: MEDICARE

## 2024-07-25 DIAGNOSIS — H26.131 TOTAL TRAUMATIC CATARACT, RIGHT EYE: ICD-10-CM

## 2024-07-25 DIAGNOSIS — H33.001 MACULA-ON RHEGMATOGENOUS RETINAL DETACHMENT OF RIGHT EYE: Primary | ICD-10-CM

## 2024-07-25 PROCEDURE — 1159F MED LIST DOCD IN RCRD: CPT | Performed by: OPHTHALMOLOGY

## 2024-07-25 PROCEDURE — 1036F TOBACCO NON-USER: CPT | Performed by: OPHTHALMOLOGY

## 2024-07-25 PROCEDURE — 99024 POSTOP FOLLOW-UP VISIT: CPT | Performed by: OPHTHALMOLOGY

## 2024-07-25 RX ORDER — OXYCODONE HYDROCHLORIDE 5 MG/1
5 TABLET ORAL EVERY 8 HOURS PRN
Qty: 5 TABLET | Refills: 0 | Status: CANCELLED | OUTPATIENT
Start: 2024-07-25

## 2024-07-25 ASSESSMENT — EXTERNAL EXAM - RIGHT EYE: OD_EXAM: NORMAL

## 2024-07-25 ASSESSMENT — VISUAL ACUITY
METHOD: SNELLEN - LINEAR
OS_SC: 20/60
OS_SC+: -2
OD_SC: HM

## 2024-07-25 ASSESSMENT — TONOMETRY
OD_IOP_MMHG: 44
IOP_METHOD: GOLDMANN APPLANATION

## 2024-07-25 ASSESSMENT — CUP TO DISC RATIO
OS_RATIO: 0.3
OD_RATIO: 0.3

## 2024-07-25 ASSESSMENT — EXTERNAL EXAM - LEFT EYE: OS_EXAM: NORMAL

## 2024-07-25 ASSESSMENT — ENCOUNTER SYMPTOMS: EYES NEGATIVE: 1

## 2024-07-25 ASSESSMENT — SLIT LAMP EXAM - LIDS
COMMENTS: NORMAL
COMMENTS: NORMAL

## 2024-07-25 NOTE — PROGRESS NOTES
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     Intraocular lens (IOL) fixed then chronic RRD right  Visual acuity (VA) is HM     Day 8    Post vitreoretinal surgery    Diagnosis:      Procedure: Pars plana vitrectomy (PPV) plus gas C3F8 13%      Retina macula is attached       Postioning:      Medications:    Prednisolone QID to surgical eye     Intraocular pressure (IOP) elevated today 44 with corneal edema, recommend anterior chamber (AC) tap right eye (consent obtained)  Intraocular pressure (IOP) improved to 7 after vitreous tap right  Intraocular pressure (IOP) right eye elevated before surgery, gretel wait for glaucoma meds      Sent Toradol for pain medication management until follow-up appointment      Allow 5 minutes for each medication to work before putting the next    Wear eye shield at night or dark    Wash eye and head after 1 day    Follow up clinic in 3-4 days for intraocular pressure (IOP) check

## 2024-07-30 ENCOUNTER — APPOINTMENT (OUTPATIENT)
Dept: OPHTHALMOLOGY | Facility: CLINIC | Age: 64
End: 2024-07-30
Payer: MEDICARE

## 2024-07-30 DIAGNOSIS — H40.31X4: ICD-10-CM

## 2024-07-30 DIAGNOSIS — H40.051 ELEVATED IOP, RIGHT: Primary | ICD-10-CM

## 2024-07-30 DIAGNOSIS — H33.001 MACULA-ON RHEGMATOGENOUS RETINAL DETACHMENT OF RIGHT EYE: ICD-10-CM

## 2024-07-30 PROCEDURE — 99024 POSTOP FOLLOW-UP VISIT: CPT | Performed by: OPHTHALMOLOGY

## 2024-07-30 PROCEDURE — 1159F MED LIST DOCD IN RCRD: CPT | Performed by: OPHTHALMOLOGY

## 2024-07-30 PROCEDURE — 1036F TOBACCO NON-USER: CPT | Performed by: OPHTHALMOLOGY

## 2024-07-30 RX ORDER — BRIMONIDINE TARTRATE 2 MG/ML
1 SOLUTION/ DROPS OPHTHALMIC 2 TIMES DAILY
Qty: 10 ML | Refills: 1 | Status: SHIPPED | OUTPATIENT
Start: 2024-07-30 | End: 2024-08-29

## 2024-07-30 RX ORDER — KETOROLAC TROMETHAMINE 10 MG/1
10 TABLET, FILM COATED ORAL EVERY 6 HOURS PRN
Qty: 20 TABLET | Refills: 0 | Status: SHIPPED | OUTPATIENT
Start: 2024-07-30 | End: 2024-08-04

## 2024-07-30 RX ORDER — ACETAZOLAMIDE 125 MG/1
500 TABLET ORAL 2 TIMES DAILY
Qty: 112 TABLET | Refills: 0 | Status: SHIPPED | OUTPATIENT
Start: 2024-07-30 | End: 2024-08-13

## 2024-07-30 ASSESSMENT — SLIT LAMP EXAM - LIDS
COMMENTS: NORMAL
COMMENTS: NORMAL

## 2024-07-30 ASSESSMENT — EXTERNAL EXAM - RIGHT EYE: OD_EXAM: NORMAL

## 2024-07-30 ASSESSMENT — TONOMETRY: IOP_METHOD: GOLDMANN APPLANATION

## 2024-07-30 ASSESSMENT — VISUAL ACUITY: METHOD: SNELLEN - LINEAR

## 2024-07-30 ASSESSMENT — CUP TO DISC RATIO
OS_RATIO: 0.3
OD_RATIO: 0.3

## 2024-07-30 ASSESSMENT — EXTERNAL EXAM - LEFT EYE: OS_EXAM: NORMAL

## 2024-07-30 ASSESSMENT — ENCOUNTER SYMPTOMS: EYES NEGATIVE: 1

## 2024-07-30 NOTE — PROGRESS NOTES
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     Intraocular lens (IOL) fixed then chronic RRD right  Visual acuity (VA) is HM     Day 12     Post vitreoretinal surgery    Diagnosis: RRD right          Procedure: Pars plana vitrectomy (PPV) plus gas C3F8 13% (7/17/24)      Retina macula is attached       Postioning:      Medications:    Prednisolone QID to surgical eye     POD#1 Intraocular pressure (IOP) elevated  44 with corneal edema, recommend anterior chamber (AC) tap right eye (consent obtained)  Intraocular pressure (IOP) improved to 7 after vitreous tap right  Intraocular pressure (IOP) right eye elevated before surgery, gretel wait for glaucoma meds    POW#1 Intraocular pressure (IOP) remains elevated today 42   Release intraocular pressure (IOP) with repeat tap vitreous (vit) today    Start diamox 500mg BID   Restart brimonidine BID right eye  PO Toradol 10mg Q6H PRN given for pain     RTC tomorrow for intraocular pressure (IOP) check    FU 1d intraocular pressure (IOP)

## 2024-07-31 ENCOUNTER — OFFICE VISIT (OUTPATIENT)
Dept: OPHTHALMOLOGY | Facility: CLINIC | Age: 64
End: 2024-07-31
Payer: MEDICARE

## 2024-07-31 DIAGNOSIS — H33.001 MACULA-ON RHEGMATOGENOUS RETINAL DETACHMENT OF RIGHT EYE: Primary | ICD-10-CM

## 2024-07-31 DIAGNOSIS — H40.051 ELEVATED IOP, RIGHT: ICD-10-CM

## 2024-07-31 PROCEDURE — 99024 POSTOP FOLLOW-UP VISIT: CPT | Performed by: OPHTHALMOLOGY

## 2024-07-31 ASSESSMENT — CONF VISUAL FIELD
OD_SUPERIOR_TEMPORAL_RESTRICTION: 2
OD_INFERIOR_NASAL_RESTRICTION: 1
OD_INFERIOR_TEMPORAL_RESTRICTION: 1
OD_SUPERIOR_NASAL_RESTRICTION: 2

## 2024-07-31 ASSESSMENT — SLIT LAMP EXAM - LIDS
COMMENTS: NORMAL
COMMENTS: NORMAL

## 2024-07-31 ASSESSMENT — TONOMETRY
OD_IOP_MMHG: 14
IOP_METHOD: GOLDMANN APPLANATION

## 2024-07-31 ASSESSMENT — EXTERNAL EXAM - LEFT EYE: OS_EXAM: NORMAL

## 2024-07-31 ASSESSMENT — VISUAL ACUITY: METHOD: SNELLEN - LINEAR

## 2024-07-31 ASSESSMENT — ENCOUNTER SYMPTOMS: EYES NEGATIVE: 1

## 2024-07-31 ASSESSMENT — CUP TO DISC RATIO
OD_RATIO: 0.3
OS_RATIO: 0.3

## 2024-07-31 ASSESSMENT — EXTERNAL EXAM - RIGHT EYE: OD_EXAM: NORMAL

## 2024-07-31 NOTE — PROGRESS NOTES
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  IOP improved to 14     Retina appears attached     Continue diamox 500mg BID   brimonidine BID right eye  PO Toradol 10mg Q6H PRN given for pain   Prednisolone QID  Moxifloxacin QID for 3 more days then stop    RTC Dr Bhakta in 1 week      PRIOR Hx:   Intraocular lens (IOL) fixed then chronic RRD right  Procedure: Pars plana vitrectomy (PPV) plus gas C3F8 13% (7/17/24)          POD#1 Intraocular pressure (IOP) elevated  44 with corneal edema, recommend anterior chamber (AC) tap right eye (consent obtained)  Intraocular pressure (IOP) improved to 7 after vitreous tap right  Intraocular pressure (IOP) right eye elevated before surgery, gretel wait for glaucoma meds    POW#1 Intraocular pressure (IOP) remains elevated today 42   Release intraocular pressure (IOP) with repeat tap vitreous (vit) today

## 2024-08-06 ENCOUNTER — APPOINTMENT (OUTPATIENT)
Dept: OPHTHALMOLOGY | Facility: CLINIC | Age: 64
End: 2024-08-06
Payer: MEDICARE

## 2024-08-06 DIAGNOSIS — H26.131 TOTAL TRAUMATIC CATARACT, RIGHT EYE: ICD-10-CM

## 2024-08-06 DIAGNOSIS — H33.001 MACULA-ON RHEGMATOGENOUS RETINAL DETACHMENT OF RIGHT EYE: Primary | ICD-10-CM

## 2024-08-06 DIAGNOSIS — H40.051 ELEVATED IOP, RIGHT: ICD-10-CM

## 2024-08-06 PROCEDURE — 99024 POSTOP FOLLOW-UP VISIT: CPT | Performed by: OPHTHALMOLOGY

## 2024-08-06 PROCEDURE — 92250 FUNDUS PHOTOGRAPHY W/I&R: CPT | Performed by: OPHTHALMOLOGY

## 2024-08-06 ASSESSMENT — SLIT LAMP EXAM - LIDS
COMMENTS: NORMAL
COMMENTS: NORMAL

## 2024-08-06 ASSESSMENT — ENCOUNTER SYMPTOMS: EYES NEGATIVE: 1

## 2024-08-06 ASSESSMENT — CUP TO DISC RATIO
OS_RATIO: 0.3
OD_RATIO: 0.3

## 2024-08-06 ASSESSMENT — VISUAL ACUITY
METHOD: SNELLEN - LINEAR
OD_SC: 20/200

## 2024-08-06 ASSESSMENT — TONOMETRY: IOP_METHOD: GOLDMANN APPLANATION

## 2024-08-06 ASSESSMENT — EXTERNAL EXAM - RIGHT EYE: OD_EXAM: NORMAL

## 2024-08-06 ASSESSMENT — EXTERNAL EXAM - LEFT EYE: OS_EXAM: NORMAL

## 2024-08-06 NOTE — PROGRESS NOTES
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  IOP improved to 14     Retina appears attached     Continue diamox 500mg BID   brimonidine BID right eye  PO Toradol 10mg Q6H PRN given for pain   Prednisolone QID  Moxifloxacin QID for 3 more days then stop    RTC Dr Bhakta in 1 week      PRIOR Hx:   Intraocular lens (IOL) fixed then chronic RRD right  Procedure: Pars plana vitrectomy (PPV) plus gas C3F8 13% (7/17/24)          POD#1 Intraocular pressure (IOP) elevated  44 with corneal edema, recommend anterior chamber (AC) tap right eye (consent obtained)  Intraocular pressure (IOP) improved to 7 after vitreous tap right  Intraocular pressure (IOP) right eye elevated before surgery, gretel wait for glaucoma meds    POW#1 Intraocular pressure (IOP) remains elevated today 42   Release intraocular pressure (IOP) with repeat tap vitreous (vit) today    Today POW#2 - intraocular pressure (IOP) improved 14, retina attached 50% gas fill  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

## 2024-08-13 ENCOUNTER — APPOINTMENT (OUTPATIENT)
Dept: OPHTHALMOLOGY | Facility: CLINIC | Age: 64
End: 2024-08-13
Payer: MEDICARE

## 2024-08-13 DIAGNOSIS — H40.051 ELEVATED IOP, RIGHT: ICD-10-CM

## 2024-08-13 DIAGNOSIS — H26.131 TOTAL TRAUMATIC CATARACT, RIGHT EYE: ICD-10-CM

## 2024-08-13 DIAGNOSIS — H43.01 VITREOUS PROLAPSE OF RIGHT EYE: Primary | ICD-10-CM

## 2024-08-13 DIAGNOSIS — H33.001 MACULA-ON RHEGMATOGENOUS RETINAL DETACHMENT OF RIGHT EYE: ICD-10-CM

## 2024-08-13 PROCEDURE — 99024 POSTOP FOLLOW-UP VISIT: CPT | Performed by: OPHTHALMOLOGY

## 2024-08-13 PROCEDURE — 1159F MED LIST DOCD IN RCRD: CPT | Performed by: OPHTHALMOLOGY

## 2024-08-13 RX ORDER — PREDNISOLONE ACETATE 10 MG/ML
1 SUSPENSION/ DROPS OPHTHALMIC 4 TIMES DAILY
Qty: 5 ML | Refills: 3 | Status: SHIPPED | OUTPATIENT
Start: 2024-08-13 | End: 2024-09-12

## 2024-08-13 RX ORDER — ACETAZOLAMIDE 125 MG/1
500 TABLET ORAL 2 TIMES DAILY
Qty: 112 TABLET | Refills: 0 | Status: SHIPPED | OUTPATIENT
Start: 2024-08-13 | End: 2024-08-27

## 2024-08-13 ASSESSMENT — CUP TO DISC RATIO
OS_RATIO: 0.3
OD_RATIO: 0.3

## 2024-08-13 ASSESSMENT — SLIT LAMP EXAM - LIDS
COMMENTS: NORMAL
COMMENTS: NORMAL

## 2024-08-13 ASSESSMENT — EXTERNAL EXAM - LEFT EYE: OS_EXAM: NORMAL

## 2024-08-13 ASSESSMENT — VISUAL ACUITY
OD_SC: 20/100+2
OD_PH_CC: 20/100+1
METHOD: SNELLEN - LINEAR
OD_CC: 20/200+1
OS_CC: 20/40-2
CORRECTION_TYPE: GLASSES

## 2024-08-13 ASSESSMENT — EXTERNAL EXAM - RIGHT EYE: OD_EXAM: NORMAL

## 2024-08-13 ASSESSMENT — TONOMETRY
IOP_METHOD: GOLDMANN APPLANATION
OS_IOP_MMHG: 13
OD_IOP_MMHG: 24

## 2024-08-13 NOTE — PROGRESS NOTES
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  IOP improved to 14     Retina appears attached     Continue diamox 500mg BID   brimonidine BID right eye     Prednisolone QID  Moxifloxacin QID for 3 more days then stop           PRIOR Hx:   Intraocular lens (IOL) fixed then chronic RRD right  Procedure: Pars plana vitrectomy (PPV) plus gas C3F8 13% (7/17/24)          POD# 1m  Intraocular pressure (IOP) elevated  44 with corneal edema, recommend anterior chamber (AC) tap right eye (consent obtained)  Intraocular pressure (IOP) improved to 7 after vitreous tap right  Intraocular pressure (IOP) right eye elevated before surgery, gretel wait for glaucoma meds    POW#1 Intraocular pressure (IOP) remains elevated today 42   Release intraocular pressure (IOP) with repeat tap vitreous (vit) today    Today POW#2 - intraocular pressure (IOP) improved 14, retina attached 50% gas fill  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

## 2024-08-21 ENCOUNTER — APPOINTMENT (OUTPATIENT)
Dept: PRIMARY CARE | Facility: CLINIC | Age: 64
End: 2024-08-21
Payer: MEDICARE

## 2024-08-21 VITALS
SYSTOLIC BLOOD PRESSURE: 100 MMHG | DIASTOLIC BLOOD PRESSURE: 68 MMHG | HEIGHT: 64 IN | BODY MASS INDEX: 27.62 KG/M2 | WEIGHT: 161.8 LBS | RESPIRATION RATE: 16 BRPM | TEMPERATURE: 97.8 F | HEART RATE: 87 BPM | OXYGEN SATURATION: 98 %

## 2024-08-21 DIAGNOSIS — Z78.0 POST-MENOPAUSAL: ICD-10-CM

## 2024-08-21 DIAGNOSIS — E78.5 HYPERLIPIDEMIA, UNSPECIFIED HYPERLIPIDEMIA TYPE: ICD-10-CM

## 2024-08-21 DIAGNOSIS — R73.9 HYPERGLYCEMIA: ICD-10-CM

## 2024-08-21 DIAGNOSIS — Z12.31 ENCOUNTER FOR SCREENING MAMMOGRAM FOR BREAST CANCER: ICD-10-CM

## 2024-08-21 DIAGNOSIS — R68.89 ABNORMAL ENDOCRINE LABORATORY TEST FINDING: ICD-10-CM

## 2024-08-21 DIAGNOSIS — M25.551 RIGHT HIP PAIN: Primary | ICD-10-CM

## 2024-08-21 PROCEDURE — 99214 OFFICE O/P EST MOD 30 MIN: CPT | Performed by: FAMILY MEDICINE

## 2024-08-21 PROCEDURE — 1036F TOBACCO NON-USER: CPT | Performed by: FAMILY MEDICINE

## 2024-08-21 PROCEDURE — 90677 PCV20 VACCINE IM: CPT | Performed by: FAMILY MEDICINE

## 2024-08-21 PROCEDURE — 3008F BODY MASS INDEX DOCD: CPT | Performed by: FAMILY MEDICINE

## 2024-08-21 PROCEDURE — G0009 ADMIN PNEUMOCOCCAL VACCINE: HCPCS | Performed by: FAMILY MEDICINE

## 2024-08-21 PROCEDURE — 1159F MED LIST DOCD IN RCRD: CPT | Performed by: FAMILY MEDICINE

## 2024-08-21 RX ORDER — MELOXICAM 7.5 MG/1
7.5 TABLET ORAL DAILY
Qty: 30 TABLET | Refills: 11 | Status: SHIPPED | OUTPATIENT
Start: 2024-08-21 | End: 2025-08-21

## 2024-08-21 ASSESSMENT — PATIENT HEALTH QUESTIONNAIRE - PHQ9
2. FEELING DOWN, DEPRESSED OR HOPELESS: SEVERAL DAYS
5. POOR APPETITE OR OVEREATING: SEVERAL DAYS
3. TROUBLE FALLING OR STAYING ASLEEP OR SLEEPING TOO MUCH: NEARLY EVERY DAY
4. FEELING TIRED OR HAVING LITTLE ENERGY: MORE THAN HALF THE DAYS
6. FEELING BAD ABOUT YOURSELF - OR THAT YOU ARE A FAILURE OR HAVE LET YOURSELF OR YOUR FAMILY DOWN: NOT AT ALL
SUM OF ALL RESPONSES TO PHQ QUESTIONS 1-9: 11
9. THOUGHTS THAT YOU WOULD BE BETTER OFF DEAD, OR OF HURTING YOURSELF: NOT AT ALL
7. TROUBLE CONCENTRATING ON THINGS, SUCH AS READING THE NEWSPAPER OR WATCHING TELEVISION: NEARLY EVERY DAY
8. MOVING OR SPEAKING SO SLOWLY THAT OTHER PEOPLE COULD HAVE NOTICED. OR THE OPPOSITE, BEING SO FIGETY OR RESTLESS THAT YOU HAVE BEEN MOVING AROUND A LOT MORE THAN USUAL: NOT AT ALL
1. LITTLE INTEREST OR PLEASURE IN DOING THINGS: SEVERAL DAYS
SUM OF ALL RESPONSES TO PHQ9 QUESTIONS 1 AND 2: 2
10. IF YOU CHECKED OFF ANY PROBLEMS, HOW DIFFICULT HAVE THESE PROBLEMS MADE IT FOR YOU TO DO YOUR WORK, TAKE CARE OF THINGS AT HOME, OR GET ALONG WITH OTHER PEOPLE: SOMEWHAT DIFFICULT

## 2024-08-21 ASSESSMENT — ANXIETY QUESTIONNAIRES
GAD7 TOTAL SCORE: 3
3. WORRYING TOO MUCH ABOUT DIFFERENT THINGS: NOT AT ALL
7. FEELING AFRAID AS IF SOMETHING AWFUL MIGHT HAPPEN: NOT AT ALL
IF YOU CHECKED OFF ANY PROBLEMS ON THIS QUESTIONNAIRE, HOW DIFFICULT HAVE THESE PROBLEMS MADE IT FOR YOU TO DO YOUR WORK, TAKE CARE OF THINGS AT HOME, OR GET ALONG WITH OTHER PEOPLE: SOMEWHAT DIFFICULT
6. BECOMING EASILY ANNOYED OR IRRITABLE: SEVERAL DAYS
5. BEING SO RESTLESS THAT IT IS HARD TO SIT STILL: NOT AT ALL
4. TROUBLE RELAXING: NOT AT ALL
1. FEELING NERVOUS, ANXIOUS, OR ON EDGE: SEVERAL DAYS
2. NOT BEING ABLE TO STOP OR CONTROL WORRYING: SEVERAL DAYS

## 2024-08-21 ASSESSMENT — ENCOUNTER SYMPTOMS
LOSS OF SENSATION IN FEET: 0
OCCASIONAL FEELINGS OF UNSTEADINESS: 0
DEPRESSION: 0

## 2024-08-21 NOTE — PROGRESS NOTES
"Subjective   Saskia Koehler is a 64 y.o. female who presents for Hip Pain (Right hip worsening for years).  HPI  PMH:  Spinal stenosis sp fusion, titanium rosemarie   Colon polyps 2022 rpt 5 yr   Shingrix/PNA UTD   NIL neg HPV 2021    R eye surg in April. Had an injury w a nail to the eye.     R hip pain for years but getting worse. Very active. Has been having to dec walking from 3 mi to 0   Can't swim d/t pain     Current Outpatient Medications on File Prior to Visit   Medication Sig Dispense Refill    acetaZOLAMIDE (Diamox) 125 mg tablet Take 4 tablets (500 mg) by mouth 2 times a day for 14 days. 112 tablet 0    brimonidine (AlphaGAN) 0.2 % ophthalmic solution Administer 1 drop into the right eye 2 times a day. 10 mL 1    prednisoLONE acetate (Pred-Forte) 1 % ophthalmic suspension Administer 1 drop into the right eye 4 times a day. 5 mL 3    [] dorzolamide-timoloL (Cosopt) 22.3-6.8 mg/mL ophthalmic solution Administer 1 drop into the right eye 2 times a day. (Patient not taking: Reported on 2024) 10 mL 1    [DISCONTINUED] sod picosulf-mag ox-citric ac (Clenpiq) 10 mg-3.5 gram- 12 gram/160 mL solution Take by mouth.       No current facility-administered medications on file prior to visit.                  Objective   /68 (BP Location: Left arm)   Pulse 87   Resp 16   Ht 1.626 m (5' 4\")   Wt 73.4 kg (161 lb 12.8 oz)   SpO2 98%   BMI 27.77 kg/m²    Physical Exam  General: NAD  HEENT:NCAT, R eye abnml, nml OP  Neck: no cervical TRINH  Heart: RRR no murmur, no edema   Lungs: CTA b/l, no wheeze or rhonchi   GI: abd soft, nontender, nondistended.   MSK: no c/c/e.   +log roll R, neg SLR, no pain w palpation   Skin: warm and dry  Psych: cooperative, appropriate affect  Neuro: speech clear. A&Ox3  Assessment/Plan   Problem List Items Addressed This Visit    None  Visit Diagnoses       Right hip pain    -  Primary  Hx c/w hip OA and causing disruption in activities/ex which is impacting her health "   RF ortho Dr Hardy   Start meloxicam  F/up 6 wk       Relevant Medications    meloxicam (Mobic) 7.5 mg tablet    Other Relevant Orders    Referral to Orthopaedic Surgery    Post-menopausal      Baseline DEXA      Relevant Orders    XR DEXA bone density    Encounter for screening mammogram for breast cancer        Relevant Orders    BI mammo bilateral screening tomosynthesis    Hyperlipidemia, unspecified hyperlipidemia type      Labs prior to IPPE     Relevant Orders    CBC and Auto Differential    Comprehensive Metabolic Panel    Lipid Panel    Hyperglycemia      Labs prior to IPPE     Relevant Orders    Hemoglobin A1C    Abnormal endocrine laboratory test finding      Labs prior to IPPE    Relevant Orders    TSH with reflex to Free T4 if abnormal    F/up 6 wk IPPE w pap

## 2024-09-03 ENCOUNTER — APPOINTMENT (OUTPATIENT)
Dept: OPHTHALMOLOGY | Facility: CLINIC | Age: 64
End: 2024-09-03
Payer: MEDICARE

## 2024-09-03 DIAGNOSIS — H35.351 CYSTOID MACULAR EDEMA OF RIGHT EYE: ICD-10-CM

## 2024-09-03 DIAGNOSIS — H33.001 MACULA-ON RHEGMATOGENOUS RETINAL DETACHMENT OF RIGHT EYE: ICD-10-CM

## 2024-09-03 DIAGNOSIS — H40.051 ELEVATED IOP, RIGHT: Primary | ICD-10-CM

## 2024-09-03 PROCEDURE — 99024 POSTOP FOLLOW-UP VISIT: CPT | Performed by: OPHTHALMOLOGY

## 2024-09-03 PROCEDURE — 92134 CPTRZ OPH DX IMG PST SGM RTA: CPT | Performed by: OPHTHALMOLOGY

## 2024-09-03 ASSESSMENT — EXTERNAL EXAM - RIGHT EYE: OD_EXAM: NORMAL

## 2024-09-03 ASSESSMENT — VISUAL ACUITY
OD_PH_SC: 20/80
METHOD: SNELLEN - LINEAR
OS_CC: 20/40
OD_SC: 20/200
OD_CC: 20/400
OD_PH_SC+: -2

## 2024-09-03 ASSESSMENT — ENCOUNTER SYMPTOMS
HEMATOLOGIC/LYMPHATIC NEGATIVE: 0
EYES NEGATIVE: 0
ENDOCRINE NEGATIVE: 0
GASTROINTESTINAL NEGATIVE: 0
CARDIOVASCULAR NEGATIVE: 0
NEUROLOGICAL NEGATIVE: 0
MUSCULOSKELETAL NEGATIVE: 0
ALLERGIC/IMMUNOLOGIC NEGATIVE: 0
CONSTITUTIONAL NEGATIVE: 0
PSYCHIATRIC NEGATIVE: 0
RESPIRATORY NEGATIVE: 0

## 2024-09-03 ASSESSMENT — EXTERNAL EXAM - LEFT EYE: OS_EXAM: NORMAL

## 2024-09-03 ASSESSMENT — SLIT LAMP EXAM - LIDS
COMMENTS: NORMAL
COMMENTS: NORMAL

## 2024-09-03 ASSESSMENT — CUP TO DISC RATIO
OS_RATIO: 0.3
OD_RATIO: 0.3

## 2024-09-03 ASSESSMENT — TONOMETRY
IOP_METHOD: GOLDMANN APPLANATION
OD_IOP_MMHG: 16

## 2024-09-03 NOTE — PROGRESS NOTES
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  IOP improved to 14     Retina appears attached     Continue diamox 500mg BID   brimonidine BID right eye     Prednisolone QID  Moxifloxacin QID for 3 more days then stop           PRIOR Hx:   Intraocular lens (IOL) fixed then chronic RRD right  Procedure: Pars plana vitrectomy (PPV) plus gas C3F8 13% (7/17/24)          POD# 1m  Intraocular pressure (IOP) elevated  44 with corneal edema, recommend anterior chamber (AC) tap right eye (consent obtained)  Intraocular pressure (IOP) improved to 7 after vitreous tap right  Intraocular pressure (IOP) right eye elevated before surgery, gretel wait for glaucoma meds    POW#1 Intraocular pressure (IOP) remains elevated today 42   Release intraocular pressure (IOP) with repeat tap vitreous (vit) today     POW#2 - intraocular pressure (IOP) improved 14, retina attached 50% gas fill  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

## 2024-09-06 ENCOUNTER — HOSPITAL ENCOUNTER (OUTPATIENT)
Dept: RADIOLOGY | Facility: HOSPITAL | Age: 64
Discharge: HOME | End: 2024-09-06
Payer: MEDICARE

## 2024-09-06 ENCOUNTER — LAB (OUTPATIENT)
Dept: LAB | Facility: LAB | Age: 64
End: 2024-09-06
Payer: MEDICARE

## 2024-09-06 ENCOUNTER — HOSPITAL ENCOUNTER (OUTPATIENT)
Dept: RADIOLOGY | Facility: CLINIC | Age: 64
Discharge: HOME | End: 2024-09-06
Payer: MEDICARE

## 2024-09-06 VITALS — WEIGHT: 160 LBS | HEIGHT: 64 IN | BODY MASS INDEX: 27.31 KG/M2

## 2024-09-06 DIAGNOSIS — E78.5 HYPERLIPIDEMIA, UNSPECIFIED HYPERLIPIDEMIA TYPE: ICD-10-CM

## 2024-09-06 DIAGNOSIS — R68.89 ABNORMAL ENDOCRINE LABORATORY TEST FINDING: ICD-10-CM

## 2024-09-06 DIAGNOSIS — R73.9 HYPERGLYCEMIA: ICD-10-CM

## 2024-09-06 DIAGNOSIS — Z78.0 POST-MENOPAUSAL: ICD-10-CM

## 2024-09-06 DIAGNOSIS — Z12.31 ENCOUNTER FOR SCREENING MAMMOGRAM FOR BREAST CANCER: ICD-10-CM

## 2024-09-06 LAB
ALBUMIN SERPL BCP-MCNC: 4.6 G/DL (ref 3.4–5)
ALP SERPL-CCNC: 60 U/L (ref 33–136)
ALT SERPL W P-5'-P-CCNC: 15 U/L (ref 7–45)
ANION GAP SERPL CALC-SCNC: 11 MMOL/L (ref 10–20)
AST SERPL W P-5'-P-CCNC: 16 U/L (ref 9–39)
BASOPHILS # BLD AUTO: 0.03 X10*3/UL (ref 0–0.1)
BASOPHILS NFR BLD AUTO: 0.6 %
BILIRUB SERPL-MCNC: 0.9 MG/DL (ref 0–1.2)
BUN SERPL-MCNC: 15 MG/DL (ref 6–23)
CALCIUM SERPL-MCNC: 9.7 MG/DL (ref 8.6–10.6)
CHLORIDE SERPL-SCNC: 102 MMOL/L (ref 98–107)
CHOLEST SERPL-MCNC: 262 MG/DL (ref 0–199)
CHOLESTEROL/HDL RATIO: 3.7
CO2 SERPL-SCNC: 32 MMOL/L (ref 21–32)
CREAT SERPL-MCNC: 0.78 MG/DL (ref 0.5–1.05)
EGFRCR SERPLBLD CKD-EPI 2021: 84 ML/MIN/1.73M*2
EOSINOPHIL # BLD AUTO: 0.18 X10*3/UL (ref 0–0.7)
EOSINOPHIL NFR BLD AUTO: 3.5 %
ERYTHROCYTE [DISTWIDTH] IN BLOOD BY AUTOMATED COUNT: 12.4 % (ref 11.5–14.5)
EST. AVERAGE GLUCOSE BLD GHB EST-MCNC: 97 MG/DL
GLUCOSE SERPL-MCNC: 87 MG/DL (ref 74–99)
HBA1C MFR BLD: 5 %
HCT VFR BLD AUTO: 41.2 % (ref 36–46)
HDLC SERPL-MCNC: 69.9 MG/DL
HGB BLD-MCNC: 13.5 G/DL (ref 12–16)
IMM GRANULOCYTES # BLD AUTO: 0.01 X10*3/UL (ref 0–0.7)
IMM GRANULOCYTES NFR BLD AUTO: 0.2 % (ref 0–0.9)
LDLC SERPL CALC-MCNC: 174 MG/DL
LYMPHOCYTES # BLD AUTO: 1.91 X10*3/UL (ref 1.2–4.8)
LYMPHOCYTES NFR BLD AUTO: 37.5 %
MCH RBC QN AUTO: 31.1 PG (ref 26–34)
MCHC RBC AUTO-ENTMCNC: 32.8 G/DL (ref 32–36)
MCV RBC AUTO: 95 FL (ref 80–100)
MONOCYTES # BLD AUTO: 0.58 X10*3/UL (ref 0.1–1)
MONOCYTES NFR BLD AUTO: 11.4 %
NEUTROPHILS # BLD AUTO: 2.38 X10*3/UL (ref 1.2–7.7)
NEUTROPHILS NFR BLD AUTO: 46.8 %
NON HDL CHOLESTEROL: 192 MG/DL (ref 0–149)
NRBC BLD-RTO: 0 /100 WBCS (ref 0–0)
PLATELET # BLD AUTO: 232 X10*3/UL (ref 150–450)
POTASSIUM SERPL-SCNC: 4.7 MMOL/L (ref 3.5–5.3)
PROT SERPL-MCNC: 6.8 G/DL (ref 6.4–8.2)
RBC # BLD AUTO: 4.34 X10*6/UL (ref 4–5.2)
SODIUM SERPL-SCNC: 140 MMOL/L (ref 136–145)
TRIGL SERPL-MCNC: 91 MG/DL (ref 0–149)
TSH SERPL-ACNC: 3.66 MIU/L (ref 0.44–3.98)
VLDL: 18 MG/DL (ref 0–40)
WBC # BLD AUTO: 5.1 X10*3/UL (ref 4.4–11.3)

## 2024-09-06 PROCEDURE — 36415 COLL VENOUS BLD VENIPUNCTURE: CPT

## 2024-09-06 PROCEDURE — 84443 ASSAY THYROID STIM HORMONE: CPT

## 2024-09-06 PROCEDURE — 85025 COMPLETE CBC W/AUTO DIFF WBC: CPT

## 2024-09-06 PROCEDURE — 77080 DXA BONE DENSITY AXIAL: CPT

## 2024-09-06 PROCEDURE — 80061 LIPID PANEL: CPT

## 2024-09-06 PROCEDURE — 77067 SCR MAMMO BI INCL CAD: CPT

## 2024-09-06 PROCEDURE — 83036 HEMOGLOBIN GLYCOSYLATED A1C: CPT

## 2024-09-06 PROCEDURE — 80053 COMPREHEN METABOLIC PANEL: CPT

## 2024-09-06 ASSESSMENT — LIFESTYLE VARIABLES
3_OR_MORE_DRINKS_PER_DAY: N
CURRENT_SMOKER: N

## 2024-09-09 ENCOUNTER — OFFICE VISIT (OUTPATIENT)
Dept: ORTHOPEDIC SURGERY | Facility: HOSPITAL | Age: 64
End: 2024-09-09
Payer: MEDICARE

## 2024-09-09 ENCOUNTER — HOSPITAL ENCOUNTER (OUTPATIENT)
Dept: RADIOLOGY | Facility: HOSPITAL | Age: 64
Discharge: HOME | End: 2024-09-09
Payer: MEDICARE

## 2024-09-09 DIAGNOSIS — M16.11 UNILATERAL PRIMARY OSTEOARTHRITIS, RIGHT HIP: ICD-10-CM

## 2024-09-09 DIAGNOSIS — M53.3 CHRONIC RIGHT SI JOINT PAIN: Primary | ICD-10-CM

## 2024-09-09 DIAGNOSIS — G89.29 CHRONIC RIGHT SI JOINT PAIN: Primary | ICD-10-CM

## 2024-09-09 DIAGNOSIS — M25.551 RIGHT HIP PAIN: ICD-10-CM

## 2024-09-09 DIAGNOSIS — Z98.1 S/P LUMBAR FUSION: ICD-10-CM

## 2024-09-09 DIAGNOSIS — M16.11 PRIMARY OSTEOARTHRITIS OF RIGHT HIP: ICD-10-CM

## 2024-09-09 PROCEDURE — 73502 X-RAY EXAM HIP UNI 2-3 VIEWS: CPT | Mod: RT

## 2024-09-09 PROCEDURE — 1125F AMNT PAIN NOTED PAIN PRSNT: CPT | Performed by: STUDENT IN AN ORGANIZED HEALTH CARE EDUCATION/TRAINING PROGRAM

## 2024-09-09 PROCEDURE — 99214 OFFICE O/P EST MOD 30 MIN: CPT | Performed by: STUDENT IN AN ORGANIZED HEALTH CARE EDUCATION/TRAINING PROGRAM

## 2024-09-09 PROCEDURE — 73502 X-RAY EXAM HIP UNI 2-3 VIEWS: CPT | Mod: RIGHT SIDE | Performed by: RADIOLOGY

## 2024-09-09 PROCEDURE — 1159F MED LIST DOCD IN RCRD: CPT | Performed by: STUDENT IN AN ORGANIZED HEALTH CARE EDUCATION/TRAINING PROGRAM

## 2024-09-09 PROCEDURE — 99204 OFFICE O/P NEW MOD 45 MIN: CPT | Performed by: STUDENT IN AN ORGANIZED HEALTH CARE EDUCATION/TRAINING PROGRAM

## 2024-09-09 RX ORDER — DORZOLAMIDE HYDROCHLORIDE AND TIMOLOL MALEATE 20; 5 MG/ML; MG/ML
SOLUTION/ DROPS OPHTHALMIC
COMMUNITY
Start: 2024-07-16

## 2024-09-09 ASSESSMENT — PAIN SCALES - GENERAL: PAINLEVEL_OUTOF10: 3

## 2024-09-09 ASSESSMENT — PAIN DESCRIPTION - DESCRIPTORS: DESCRIPTORS: ACHING;SORE

## 2024-09-09 ASSESSMENT — PAIN - FUNCTIONAL ASSESSMENT: PAIN_FUNCTIONAL_ASSESSMENT: 0-10

## 2024-09-09 NOTE — PROGRESS NOTES
Denise Hardy MD   Adult Reconstruction and Joint Replacement Surgery  Phone: [phone]     Fax: [phone]       Name: Saskia Koehler  Age: 64 y.o.   : 1960   Date of Visit: 2024    INITIAL CONSULTATION    CC: Right hip pain    HPI:  This patient presents with 3 years of RIGHT hip pain. They were referred by Dr. Behm.    Patient has tried the following Activity modification. Date of last steroid injection: n/a. Patient does not have pain at night. Patient does not report falls related to this problem. Patient is able to walk 1 mile. Patient is currently using nothing as assistive device. Primarily complains of groin pain. Patient has difficulty with stairs and getting in/out of car . The pain is significantly impacting their ability to perform activities of daily living. Patient reports no longer able to do activities such as swimming, biking, horseback riding. The patient feels not feel RIGHT leg is shorter.     Focused History  PMH: Reviewed and PE/DVT: N  PSH: Reviewed , Hip/Knee replacement: N, Hip/Knee surgery: N, Anesthesia complications: N, Spine surgery: Y, Surgical infection: N, and Weight loss surgery: N  Meds: Reviewed, Current Anticoagulants: N, Weight loss medication: N, and Current Opioids: N  Allergies: Reviewed, Morphine. Patient reports no contraindications or allergies to cephalosporins, aspirin, NSAIDs or opioids, except as noted above.  FH: No family history of any bleeding or clotting disorders.  SHx: Reviewed, Occupation: Retired, Current smoker: N, EtOH intake weekly: 0.5, Social support: , and Preferred physical activities: Walking, Swimming, Horseback riding  Dental Hx: Last routine cleaning: Unsure, not in multiple years and Discussed that all invasive dental work must be completed at least 3 months prior to joint replacement surgery. Patient understands they are to avoid any invasive dental work 3-6 months post-surgically.   Yazdanism:  no    PROMS/HISTORY  PROMs   No questionnaires on file.     Past Medical History:   Diagnosis Date    Corneal abrasion     Diverticulosis     Eye trauma     Injury 4/2024    Nephrolithiasis     Retinal detachment        Past Medical History:   Diagnosis Date    Corneal abrasion     Diverticulosis     Eye trauma     Injury 4/2024    Nephrolithiasis     Retinal detachment      Documented in chart and reviewed.     Past Surgical History:   Procedure Laterality Date    BACK SURGERY  01/30/2014    Back Surgery    BACK SURGERY Bilateral     CATARACT EXTRACTION      COLONOSCOPY         Allergies: She is allergic to cat's claw, morphine, and hay fever and allergy relief.     Medications:  Current Outpatient Medications   Medication Instructions    dorzolamide-timoloL (Cosopt) 22.3-6.8 mg/mL ophthalmic solution     meloxicam (MOBIC) 7.5 mg, oral, Daily    prednisoLONE acetate (Pred-Forte) 1 % ophthalmic suspension 1 drop, Right Eye, 4 times daily       Family History   Problem Relation Name Age of Onset    No Known Problems Mother       Documented in chart and reviewed.     Social History     Tobacco Use    Smoking status: Never     Passive exposure: Never    Smokeless tobacco: Never    Tobacco comments:     Pt denies any illness in past 30 days     Denies any personal/family reactions or problems with anesthesia     Denies SOB with stairs or activity     Ambulates freely   Substance Use Topics    Alcohol use: Not Currently     Alcohol/week: 1.0 standard drink of alcohol     Types: 1 Standard drinks or equivalent per week        Review of Systems: Review of systems completed with medical assistant intake. Please refer to this note.     Physical Exam:  BMI: 27.    General: The patient is well appearing and has an appropriate affect.     Neurological Examination: SILT in SPN/DPN/Sural/Saphenous/Tibial nerves. 5/5 EHL, FHL, Tibial anterior, Gastrocnemius. Coordination grossly intact.     Cardiovascular Exam: Capillary refill <2  seconds. 2+ DP. No edema. No varicose veins.     Lymphatic Examination: There is no obvious lymphatic swelling present around the involved joint.    Skin Exam: Skin around the pertinent joint is without evidence of infection or rash.    Gait: The patient ambulates with a coxalgic gait.     Lumbar spine:    No tenderness to palpation midline.    Negative straight leg raise bilaterally.    Right Hip Examination:  Gait: Coxalgic gait.    Examination of the hip reveals the skin to be intact.    There is mild tenderness over the greater trochanter.    There is no obvious swelling.    There is a negative Stinchfield test.    Positive SI joint tenderness to palpation.  Positive Gaenslen's.  Positive Luann.  Positive compression.    Range of motion is: full extension to 100 degrees of flexion.    The hip internally rotates to 20 degrees and externally rotates to 45 degrees.    Abduction is 40 degrees and adduction is 20 degrees.    There is groin and buttock pain with hip motion.    There is a negative straight leg raise.    Left Hip Examination:  Examination of the hip reveals the skin to be intact.    There is no tenderness over the greater trochanter.    There is no obvious swelling.    There is a negative Stinchfield test.    Range of motion is full extension to 90 degrees of flexion.    The hip internally rotates to 20 and externally rotates 40 degrees.    Abduction is 50 degrees and adduction is 20 degrees.    There is no groin and buttock pain with hip motion.    There is a negative straight leg raise.    Right Knee Examination:  Examination of the right knee reveals the skin to be intact. There is no obvious swelling.    There is no tenderness to palpation.    Range of motion is full extension to 120 degrees of flexion.    The knee is stable.    There is no grinding with range of motion.    There is no patellofemoral crepitus.    Left Knee Examination:  Examination of the left knee reveals the skin to be intact.  "There is no obvious swelling.    There is no tenderness to palpation.    Range of motion is full extension to 120 degrees of flexion.    The knee is stable.    There is no grinding with range of motion.    There is no patellofemoral crepitus.    Prior Labs:   Prior Labs:   Lab Results   Component Value Date    WBC 5.1 09/06/2024    HGB 13.5 09/06/2024    HCT 41.2 09/06/2024    MCV 95 09/06/2024     09/06/2024      No results found for: \"INR\", \"PROTIME\"      Lab Results   Component Value Date    GLUCOSE 87 09/06/2024    CALCIUM 9.7 09/06/2024     09/06/2024    K 4.7 09/06/2024    CO2 32 09/06/2024     09/06/2024    BUN 15 09/06/2024    CREATININE 0.78 09/06/2024      No results found for: \"CKTOTAL\", \"CKMB\", \"CKMBINDEX\", \"TROPONINI\"   Lab Results   Component Value Date    HGBA1C 5.0 09/06/2024         No results found for: \"CRP\"   Lab Results   Component Value Date    SEDRATE 2 05/24/2022         Radiographs:  Radiographs were personally reviewed today with the patient. There is evidence of mild  RIGHT  hip osteoarthritis without bone on bone apposition.  This occurs in the setting of a mild femoral stem impingement.  Moderate arthritis of the right SI joint, mild arthritis of the left SI joint.    Impression:  This patient presents with  RIGHT  hip osteoarthritis without bone on bone apposition.  There is no indication for surgery at this time.    Diagnosis:   Chronic right SI joint pain    Right hip pain    Primary osteoarthritis of right hip    S/P lumbar fusion     Recommendations / Plan:    I have discussed the options in detail with the patient. We have discussed anti-inflammatory medication, activity modification, physical therapy, corticosteroid injections, and total hip replacement surgery.     The risks and benefits of all these treatment options have been discussed in detail.  At this point, the patient has mild symptoms with regard to her right hip joint.  She seems to be more bothered " by right SI joint degenerative changes which are visible on her pelvis x-rays.  She also has multiple positive signs referring to the right SI joint.  At this point, she prefers to monitor her symptoms and begin early conservative treatment of right hip arthritis.  If her back continues to be a problem, she is interested in seeking a medical spine referral which I am happy to give her in the future.    A physical therapy prescription was ordered for the patient.  Patient will continue their home exercise program. Strategies for pain management using over-the-counter anti-inflammatory medications reviewed.  The patient was prescribed Meloxicam previously for pain management.  No specific indication for steroid injection at this time.  Encouraged them to maintain range of motion and strength around the hip and knee joints.  They will continue to implement these strategies in addressing their pain.       Recommend the patient continue optimizing nonsurgical treatment interventions as outlined above for management of their arthritis.  I would be happy to see them again at any point to discuss surgery if they are more optimized or to review progress with nonsurgical treatment of arthritis.  The patient verbalizes understanding with the recommendations and treatment plan as outlined above and is in agreement.  Questions were addressed.    _____________  Denise Hardy MD   Attending Orthopaedic Surgeon  [facility]    [facility]       This office note was transcribed with dictation software.  Please excuse any typographical errors, program misunderstandings leading to inadvertent insertions or deletions of inappropriate wording, pronoun errors and other unintentional transcription errors not noticed on proof-reading.

## 2024-09-13 ENCOUNTER — ANESTHESIA EVENT (OUTPATIENT)
Dept: OPERATING ROOM | Facility: HOSPITAL | Age: 64
End: 2024-09-13
Payer: MEDICARE

## 2024-09-13 ENCOUNTER — HOSPITAL ENCOUNTER (OUTPATIENT)
Facility: HOSPITAL | Age: 64
Discharge: HOME | End: 2024-09-13
Attending: OPHTHALMOLOGY | Admitting: OPHTHALMOLOGY
Payer: MEDICARE

## 2024-09-13 ENCOUNTER — ANESTHESIA (OUTPATIENT)
Dept: OPERATING ROOM | Facility: HOSPITAL | Age: 64
End: 2024-09-13
Payer: MEDICARE

## 2024-09-13 ENCOUNTER — OFFICE VISIT (OUTPATIENT)
Dept: OPHTHALMOLOGY | Facility: CLINIC | Age: 64
End: 2024-09-13
Payer: MEDICARE

## 2024-09-13 VITALS
BODY MASS INDEX: 27.66 KG/M2 | HEIGHT: 64 IN | HEART RATE: 74 BPM | OXYGEN SATURATION: 96 % | WEIGHT: 162.04 LBS | SYSTOLIC BLOOD PRESSURE: 131 MMHG | TEMPERATURE: 97.2 F | DIASTOLIC BLOOD PRESSURE: 70 MMHG | RESPIRATION RATE: 19 BRPM

## 2024-09-13 DIAGNOSIS — H33.001 MACULA-ON RHEGMATOGENOUS RETINAL DETACHMENT OF RIGHT EYE: Primary | ICD-10-CM

## 2024-09-13 DIAGNOSIS — H33.001 MACULA-ON RHEGMATOGENOUS RETINAL DETACHMENT OF RIGHT EYE: ICD-10-CM

## 2024-09-13 PROCEDURE — 2500000001 HC RX 250 WO HCPCS SELF ADMINISTERED DRUGS (ALT 637 FOR MEDICARE OP)

## 2024-09-13 PROCEDURE — 3700000001 HC GENERAL ANESTHESIA TIME - INITIAL BASE CHARGE: Performed by: OPHTHALMOLOGY

## 2024-09-13 PROCEDURE — 99024 POSTOP FOLLOW-UP VISIT: CPT | Performed by: OPHTHALMOLOGY

## 2024-09-13 PROCEDURE — 2500000004 HC RX 250 GENERAL PHARMACY W/ HCPCS (ALT 636 FOR OP/ED): Performed by: ANESTHESIOLOGY

## 2024-09-13 PROCEDURE — 7100000001 HC RECOVERY ROOM TIME - INITIAL BASE CHARGE: Performed by: OPHTHALMOLOGY

## 2024-09-13 PROCEDURE — 2500000005 HC RX 250 GENERAL PHARMACY W/O HCPCS: Performed by: OPHTHALMOLOGY

## 2024-09-13 PROCEDURE — 2720000007 HC OR 272 NO HCPCS: Performed by: OPHTHALMOLOGY

## 2024-09-13 PROCEDURE — 3700000002 HC GENERAL ANESTHESIA TIME - EACH INCREMENTAL 1 MINUTE: Performed by: OPHTHALMOLOGY

## 2024-09-13 PROCEDURE — 2500000005 HC RX 250 GENERAL PHARMACY W/O HCPCS

## 2024-09-13 PROCEDURE — P9045 ALBUMIN (HUMAN), 5%, 250 ML: HCPCS | Mod: JZ,JG

## 2024-09-13 PROCEDURE — 7100000009 HC PHASE TWO TIME - INITIAL BASE CHARGE: Performed by: OPHTHALMOLOGY

## 2024-09-13 PROCEDURE — 7100000002 HC RECOVERY ROOM TIME - EACH INCREMENTAL 1 MINUTE: Performed by: OPHTHALMOLOGY

## 2024-09-13 PROCEDURE — 67113 REPAIR RETINAL DETACH CPLX: CPT | Performed by: OPHTHALMOLOGY

## 2024-09-13 PROCEDURE — 3600000003 HC OR TIME - INITIAL BASE CHARGE - PROCEDURE LEVEL THREE: Performed by: OPHTHALMOLOGY

## 2024-09-13 PROCEDURE — 7100000010 HC PHASE TWO TIME - EACH INCREMENTAL 1 MINUTE: Performed by: OPHTHALMOLOGY

## 2024-09-13 PROCEDURE — 2500000004 HC RX 250 GENERAL PHARMACY W/ HCPCS (ALT 636 FOR OP/ED)

## 2024-09-13 PROCEDURE — 3600000008 HC OR TIME - EACH INCREMENTAL 1 MINUTE - PROCEDURE LEVEL THREE: Performed by: OPHTHALMOLOGY

## 2024-09-13 DEVICE — IMPLANTABLE DEVICE: Type: IMPLANTABLE DEVICE | Site: EYE | Status: FUNCTIONAL

## 2024-09-13 DEVICE — SILIKON™ 1000 (PURIFIED POLYDIMETHYLSILOXANE) IS HIGHLY PURIFIED LONG CHAIN POLYDIMETHYLSILOXANE TRIMETHYLSILOXY TERMINATED SILICONE OIL. IT IS STERILE,NON-PYROGENIC, CLEAR, COLORLESS AND HAS A VISCOSITY OF 1000 CS. FOR USE AS A POST-OPERATIVE RETINAL TAMPONADE DURING VITREORETINAL SURGERY. SILIKON™ 1000IS COMPOSED OF SILICON, OXYGEN, CARBON AND HYDROGEN ATOMS. SILIKON™ 1000 IS IMMISCIBLE WITH AQUEOUS COMPONENTS AND IS RELATIVELY INERT MATERIAL WITHLITTLE BIOLOGICAL TOXICITY POTENTIAL.
Type: IMPLANTABLE DEVICE | Site: EYE | Status: FUNCTIONAL
Brand: SILIKON™

## 2024-09-13 DEVICE — STRIP, SILICONE 3.5 MM P 41: Type: IMPLANTABLE DEVICE | Site: EYE | Status: FUNCTIONAL

## 2024-09-13 RX ORDER — SODIUM CHLORIDE, SODIUM LACTATE, POTASSIUM CHLORIDE, CALCIUM CHLORIDE 600; 310; 30; 20 MG/100ML; MG/100ML; MG/100ML; MG/100ML
20 INJECTION, SOLUTION INTRAVENOUS CONTINUOUS
Status: CANCELLED | OUTPATIENT
Start: 2024-09-13

## 2024-09-13 RX ORDER — LIDOCAINE HYDROCHLORIDE 10 MG/ML
0.1 INJECTION, SOLUTION EPIDURAL; INFILTRATION; INTRACAUDAL; PERINEURAL ONCE
Status: DISCONTINUED | OUTPATIENT
Start: 2024-09-13 | End: 2024-09-13 | Stop reason: HOSPADM

## 2024-09-13 RX ORDER — METHYLPREDNISOLONE SODIUM SUCCINATE 500 MG/8ML
INJECTION INTRAMUSCULAR; INTRAVENOUS AS NEEDED
Status: DISCONTINUED | OUTPATIENT
Start: 2024-09-13 | End: 2024-09-13

## 2024-09-13 RX ORDER — SODIUM CHLORIDE, SODIUM LACTATE, POTASSIUM CHLORIDE, CALCIUM CHLORIDE 600; 310; 30; 20 MG/100ML; MG/100ML; MG/100ML; MG/100ML
100 INJECTION, SOLUTION INTRAVENOUS CONTINUOUS
Status: DISCONTINUED | OUTPATIENT
Start: 2024-09-13 | End: 2024-09-13 | Stop reason: HOSPADM

## 2024-09-13 RX ORDER — OFLOXACIN 3 MG/ML
1 SOLUTION/ DROPS OPHTHALMIC 4 TIMES DAILY
Qty: 10 ML | Refills: 1 | Status: SHIPPED | OUTPATIENT
Start: 2024-09-13 | End: 2024-09-23

## 2024-09-13 RX ORDER — ACETAMINOPHEN 325 MG/1
650 TABLET ORAL EVERY 4 HOURS PRN
Status: DISCONTINUED | OUTPATIENT
Start: 2024-09-13 | End: 2024-09-13 | Stop reason: HOSPADM

## 2024-09-13 RX ORDER — METOCLOPRAMIDE HYDROCHLORIDE 5 MG/ML
10 INJECTION INTRAMUSCULAR; INTRAVENOUS ONCE AS NEEDED
Status: COMPLETED | OUTPATIENT
Start: 2024-09-13 | End: 2024-09-13

## 2024-09-13 RX ORDER — PREDNISOLONE ACETATE 10 MG/ML
1 SUSPENSION/ DROPS OPHTHALMIC 4 TIMES DAILY
Status: CANCELLED | OUTPATIENT
Start: 2024-09-13

## 2024-09-13 RX ORDER — GLYCOPYRROLATE 0.2 MG/ML
INJECTION INTRAMUSCULAR; INTRAVENOUS AS NEEDED
Status: DISCONTINUED | OUTPATIENT
Start: 2024-09-13 | End: 2024-09-13

## 2024-09-13 RX ORDER — LABETALOL HYDROCHLORIDE 5 MG/ML
5 INJECTION, SOLUTION INTRAVENOUS ONCE AS NEEDED
Status: DISCONTINUED | OUTPATIENT
Start: 2024-09-13 | End: 2024-09-13 | Stop reason: HOSPADM

## 2024-09-13 RX ORDER — DROPERIDOL 2.5 MG/ML
0.62 INJECTION, SOLUTION INTRAMUSCULAR; INTRAVENOUS ONCE AS NEEDED
Status: DISCONTINUED | OUTPATIENT
Start: 2024-09-13 | End: 2024-09-13 | Stop reason: HOSPADM

## 2024-09-13 RX ORDER — LIDOCAINE HYDROCHLORIDE 20 MG/ML
INJECTION, SOLUTION INFILTRATION; PERINEURAL AS NEEDED
Status: DISCONTINUED | OUTPATIENT
Start: 2024-09-13 | End: 2024-09-13

## 2024-09-13 RX ORDER — FENTANYL CITRATE 50 UG/ML
25 INJECTION, SOLUTION INTRAMUSCULAR; INTRAVENOUS EVERY 5 MIN PRN
Status: DISCONTINUED | OUTPATIENT
Start: 2024-09-13 | End: 2024-09-13 | Stop reason: HOSPADM

## 2024-09-13 RX ORDER — KETOROLAC TROMETHAMINE 30 MG/ML
INJECTION, SOLUTION INTRAMUSCULAR; INTRAVENOUS AS NEEDED
Status: DISCONTINUED | OUTPATIENT
Start: 2024-09-13 | End: 2024-09-13

## 2024-09-13 RX ORDER — OFLOXACIN 3 MG/ML
1 SOLUTION/ DROPS OPHTHALMIC 4 TIMES DAILY
Status: CANCELLED | OUTPATIENT
Start: 2024-09-13

## 2024-09-13 RX ORDER — FENTANYL CITRATE 50 UG/ML
INJECTION, SOLUTION INTRAMUSCULAR; INTRAVENOUS AS NEEDED
Status: DISCONTINUED | OUTPATIENT
Start: 2024-09-13 | End: 2024-09-13

## 2024-09-13 RX ORDER — TETRACAINE HYDROCHLORIDE 5 MG/ML
1 SOLUTION OPHTHALMIC ONCE
Status: COMPLETED | OUTPATIENT
Start: 2024-09-13 | End: 2024-09-13

## 2024-09-13 RX ORDER — ALBUMIN HUMAN 50 G/1000ML
SOLUTION INTRAVENOUS AS NEEDED
Status: DISCONTINUED | OUTPATIENT
Start: 2024-09-13 | End: 2024-09-13

## 2024-09-13 RX ORDER — MIDAZOLAM HYDROCHLORIDE 1 MG/ML
1 INJECTION INTRAMUSCULAR; INTRAVENOUS ONCE AS NEEDED
Status: DISCONTINUED | OUTPATIENT
Start: 2024-09-13 | End: 2024-09-13 | Stop reason: HOSPADM

## 2024-09-13 RX ORDER — TROPICAMIDE 10 MG/ML
1 SOLUTION/ DROPS OPHTHALMIC
Status: COMPLETED | OUTPATIENT
Start: 2024-09-13 | End: 2024-09-13

## 2024-09-13 RX ORDER — PHENYLEPHRINE HYDROCHLORIDE 25 MG/ML
1 SOLUTION/ DROPS OPHTHALMIC
Status: COMPLETED | OUTPATIENT
Start: 2024-09-13 | End: 2024-09-13

## 2024-09-13 RX ORDER — PREDNISOLONE ACETATE 10 MG/ML
1 SUSPENSION/ DROPS OPHTHALMIC 4 TIMES DAILY
Qty: 10 ML | Refills: 1 | Status: SHIPPED | OUTPATIENT
Start: 2024-09-13 | End: 2024-10-13

## 2024-09-13 RX ORDER — LIDOCAINE HYDROCHLORIDE 40 MG/ML
SOLUTION TOPICAL AS NEEDED
Status: DISCONTINUED | OUTPATIENT
Start: 2024-09-13 | End: 2024-09-13

## 2024-09-13 RX ORDER — SODIUM CHLORIDE, SODIUM LACTATE, POTASSIUM CHLORIDE, CALCIUM CHLORIDE 600; 310; 30; 20 MG/100ML; MG/100ML; MG/100ML; MG/100ML
20 INJECTION, SOLUTION INTRAVENOUS CONTINUOUS
Status: DISCONTINUED | OUTPATIENT
Start: 2024-09-13 | End: 2024-09-13 | Stop reason: HOSPADM

## 2024-09-13 RX ORDER — HYDROMORPHONE HYDROCHLORIDE 1 MG/ML
INJECTION, SOLUTION INTRAMUSCULAR; INTRAVENOUS; SUBCUTANEOUS AS NEEDED
Status: DISCONTINUED | OUTPATIENT
Start: 2024-09-13 | End: 2024-09-13

## 2024-09-13 RX ORDER — PROPOFOL 10 MG/ML
INJECTION, EMULSION INTRAVENOUS AS NEEDED
Status: DISCONTINUED | OUTPATIENT
Start: 2024-09-13 | End: 2024-09-13

## 2024-09-13 RX ORDER — MIDAZOLAM HYDROCHLORIDE 1 MG/ML
INJECTION INTRAMUSCULAR; INTRAVENOUS AS NEEDED
Status: DISCONTINUED | OUTPATIENT
Start: 2024-09-13 | End: 2024-09-13

## 2024-09-13 RX ORDER — PHENYLEPHRINE HCL IN 0.9% NACL 0.4MG/10ML
SYRINGE (ML) INTRAVENOUS AS NEEDED
Status: DISCONTINUED | OUTPATIENT
Start: 2024-09-13 | End: 2024-09-13

## 2024-09-13 RX ORDER — ROCURONIUM BROMIDE 10 MG/ML
INJECTION, SOLUTION INTRAVENOUS AS NEEDED
Status: DISCONTINUED | OUTPATIENT
Start: 2024-09-13 | End: 2024-09-13

## 2024-09-13 ASSESSMENT — PAIN SCALES - GENERAL
PAINLEVEL_OUTOF10: 4
PAINLEVEL_OUTOF10: 4
PAINLEVEL_OUTOF10: 6
PAINLEVEL_OUTOF10: 4
PAIN_LEVEL: 0
PAINLEVEL_OUTOF10: 0 - NO PAIN
PAINLEVEL_OUTOF10: 6
PAINLEVEL_OUTOF10: 4
PAINLEVEL_OUTOF10: 4
PAINLEVEL_OUTOF10: 0 - NO PAIN

## 2024-09-13 ASSESSMENT — VISUAL ACUITY
OS_CC: 20/30
OD_CC: 20/400
OS_PH_CC: 20/20
CORRECTION_TYPE: GLASSES
OD_PH_CC: 20/80+1
METHOD: SNELLEN - LINEAR

## 2024-09-13 ASSESSMENT — PAIN - FUNCTIONAL ASSESSMENT
PAIN_FUNCTIONAL_ASSESSMENT: 0-10

## 2024-09-13 ASSESSMENT — SLIT LAMP EXAM - LIDS
COMMENTS: NORMAL
COMMENTS: NORMAL

## 2024-09-13 ASSESSMENT — CUP TO DISC RATIO
OD_RATIO: 0.3
OS_RATIO: 0.3

## 2024-09-13 ASSESSMENT — EXTERNAL EXAM - RIGHT EYE: OD_EXAM: NORMAL

## 2024-09-13 ASSESSMENT — TONOMETRY
OS_IOP_MMHG: 12
IOP_METHOD: GOLDMANN APPLANATION
OD_IOP_MMHG: 12

## 2024-09-13 ASSESSMENT — EXTERNAL EXAM - LEFT EYE: OS_EXAM: NORMAL

## 2024-09-13 NOTE — OP NOTE
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 [H33.001]     Procedures  Vitrectomy Pars Plana and  scleral buckle, with OIL AND LASER  [id number] - [facility] VITRECTOMY MECHANICAL PARS PLANA      Surgeons      * Fabrizio Bhakta - Primary    Resident/Fellow/Other Assistant:  Surgeons and Role:  * No surgeons found with a matching role *    Procedure Summary  Anesthesia: General  ASA: II  Anesthesia Staff: Anesthesiologist: Moiz Loja MD  C-AA: INOCENCIO [place]  Estimated Blood Loss: 1mL  Intra-op Medications:   Administrations occurring from 1200 to 1335 on 24:   Medication Name Total Dose   balanced salts (BSS) intraocular solution 515 mL              Anesthesia Record               Intraprocedure I/O Totals          Intake    LR bolus 1000.00 mL    Total Intake 1000 mL          Specimen: No specimens collected     Staff:   Circulator: Shanti Leachub Person: Biju         Drains and/or Catheters: * None in log *    Tourniquet Times:         Implants:  Implants       Type Name Action Serial No.      Lens STRIP, SILICONE 3.5 MM P 41 - S92-09 - [id number] Implanted 92-09     Ophthalmology Implant Scleral Buckling product Style  N/A  Silicone Sleeve Implanted 92-35     Implant MED,SILICONE OIL 1000 OPHTHALMIC - [id number] Implanted               Findings: Rhegmatogenous retinal detachment, right eye  Proliferative vitreoretinopathy, right eye    Indications: Saskia Koehler is an 64 y.o. 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     The patient was seen in the preoperative area. The risks, benefits, complications,  "treatment options, non-operative alternatives, expected recovery and outcomes were discussed with the patient. The possibilities of reaction to medication, pulmonary aspiration, injury to surrounding structures, bleeding, recurrent infection, the need for additional procedures, failure to diagnose a condition, and creating a complication requiring transfusion or operation were discussed with the patient. The patient concurred with the proposed plan, giving informed consent.  The site of surgery was properly noted/marked if necessary per policy. The patient has been actively warmed in preoperative area. Preoperative antibiotics are not indicated. Venous thrombosis prophylaxis are not indicated.    Procedure Details: The patient was brought to the preoperative holding area where the correct eye was confirmed and marked. The patient was then brought to the operating room where a secondary time-out was performed to identify the correct patient, eye, procedure, and any allergies. The patient then underwent general anesthesia induction and intubation per the anesthesia providers. The eye was prepped and draped in the usual sterile ophthalmic fashion followed by placement of a lid speculum.      A 360 degree conjunctival peritomy was performed. The conjunctiva and Tenon's were reflected away from each rectus muscle and a muscle hook used to isolate each rectus muscle with care to avoid the oblique tendons. A silk tie was passed underneath each isolated rectus muscle. Approximately midway between each rectus muscle, a crescent blade was used to create parallel incisions radial to the pupil in a scleral \"belt loop\" technique.  Attention was then turned to the vitrectomy.     A 23-gauge trocar was placed in the inferotemporal quadrant 3.5 mm posterior to the limbus.  A 4 mm infusion cannula was placed through this trocar, and the infusion cannula was confirmed in the vitreous cavity prior to turning it on. Two additional " "25-gauge trocars were placed in a similar fashion in the superonasal and superotemporal quadrants 3.5 mm posterior to the limbus after conjunctival displacement.     A macula-on rhegmatogenous retinal detachment with proliferative vitreoretinopathy was found consistent with preoperative examination from 9/13/24. At this time, a standard three-port pars plana vitrectomy was performed using the light pipe, the cutter, and the BIOM viewing system. A core vitreous dissection was performed. The retina was inspected with scleral depression 360 degrees and was found to be detached consistent with preoperative examination from 9/13/24 without additional tears or breaks. A relaxing retinectomy was performed in the area of proliferative vitreoretinopathy. A retinotomy site was created outside of the macula for drainage. A soft tip cannula was used to drain the subretinal fluid with flattening and attachment of the retina noted. Endolaser was applied with good uptake 360 degrees around the retinotomy and relaxing retinectomy sites. Approximately 100% silicone oil fill of the vitreous cavity was performed with venting through the nasal trocar via a chimney device. An air bubble was noted in the anterior chamber. All trocar sites were closed with a single 7-0 Vicryl suture and were found to be airtight.    Attention was then paid to the completion of the scleral buckle. A 41 sleeve silicone band was passed underneath all rectus muscles and scleral \"belt loops\" and thus secured to the sclera. A silicone sleeve was then used to secure each end of the band with good tension and imbrication noted on inspection of the retina with the BIOM lens. Physiologic intraocular pressure was noted after placement of the scleral buckle.     The 360 degree peritomy was closed with nasal and temporal 7-0 Vicryl sutures and the limbal redundant conjunctiva trimmed. Tobradex ointment, a patch, and shield were applied to the operative eye.    The " patient tolerated the procedure well without any intraoperative or immediate postoperative complications. The patient was taken to the recovery room in good condition. The patient will follow up in clinic on the following morning.  Complications:  None; patient tolerated the procedure well.    Disposition: PACU - hemodynamically stable.  Condition: stable         Additional Details:     Attending Attestation: I performed the procedure.    Fabrizio Bhakta  Phone Number: [phone]

## 2024-09-13 NOTE — ANESTHESIA POSTPROCEDURE EVALUATION
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    Anesthesia Post Evaluation    Patient location during evaluation: PACU  Patient participation: complete - patient participated  Level of consciousness: sleepy but conscious  Pain score: 0  Pain management: adequate  Airway patency: patent  Cardiovascular status: acceptable  Respiratory status: acceptable and room air  Hydration status: acceptable  Postoperative Nausea and Vomiting: none        There were no known notable events for this encounter.

## 2024-09-13 NOTE — PROGRESS NOTES
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  -     OCT, Retina - OU - Both Eyes  -     Color Fundus Photography - OU - Both Eyes  -     Case Request Operating Room: Vitrectomy Pars Plana; Standing  Other orders  -     Place in outpatient/hospital ambulatory surgery; Standing  -     NPO Diet Except: Sips with meds; Effective now; Standing  -     Height and weight; Standing  -     Insert and maintain peripheral IV; Standing  -     Saline lock IV; Standing  -     Type And Screen; Standing  -     lactated Ringer's infusion        Here today for IOP check done it is     Vision has improved 20/200 from HM, epithelial edema improved, now only with residual D folds.  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 Infections, discomfort form sutures and hemorrhage as well as retinal detachment were explained. The biggest risk of  surgery failing in a retinal detachment are due to challenges in surgery or late scar formation described as proliferative vitreoretinopathy. In a membrane peel there can be  along period for recovery of visual acuity, in more long standing membrane,s takes longer to resolve retinal abnormalities        POD# 1m  Intraocular pressure (IOP) elevated  44 with corneal edema, recommend anterior chamber (AC) tap right eye (consent obtained)  Intraocular pressure (IOP) improved to 7 after vitreous tap right  Intraocular pressure (IOP) right eye elevated before surgery, gretel wait for glaucoma meds    POW#1 Intraocular pressure (IOP) remains elevated today 42   Release  intraocular pressure (IOP) with repeat tap vitreous (vit) today     POW#2 - intraocular pressure (IOP) improved 14, retina attached 50% gas fill  Small foci of proliferative vitreoretinopathy (PVR) inferotemporally along arcade - will monitor for now  Increase diamox  BID daily,    Continue prednisolone TID, and brimodine BID    POW#7- today vision 20/200 PH 20/80, intraocular pressure (IOP) WNL 16, retina appears attached, 10% air bubble  New cystoid macular edema (CME) today   Proliferative vitreoretinopathy (PVR)  RRD right      Plan pars plana vitrectomy (PPV) scleral buckling procedure (SB) OIL  Today       The risks and benefits of vitreoretinal surgery was explained clearly.Risks include loss of vision even permanently. Infections, discomfort form sutures and hemorrhage as well as retinal detachment were explained. The biggest risk of  surgery failing in a retinal detachment are due to challenges in surgery or late scar formation described as proliferative vitreoretinopathy. In a membrane peel there can be  along period for recovery of visual acuity, in more long standing membrane,s takes longer to resolve retinal abnormalities

## 2024-09-13 NOTE — H&P
"History Of Present Illness  Saskia Koehler is a 64 y.o. female with history of macula-on retinal detachment in the right eye presenting for planned pars plana vitrectomy (PPV), scleral buckling procedure (SB), possible oil versus gas, possible endolaser in the right eye.      Past Medical History  Past Medical History:   Diagnosis Date    Corneal abrasion     Diverticulosis     Eye trauma     Injury 4/2024    Nephrolithiasis     Retinal detachment        Surgical History  Past Surgical History:   Procedure Laterality Date    BACK SURGERY  01/30/2014    Back Surgery    BACK SURGERY Bilateral     CATARACT EXTRACTION      COLONOSCOPY          Social History  She reports that she has never smoked. She has never been exposed to tobacco smoke. She has never used smokeless tobacco. She reports that she does not currently use alcohol after a past usage of about 1.0 standard drink of alcohol per week. Drug use questions deferred to the physician.    Family History  Family History   Problem Relation Name Age of Onset    No Known Problems Mother          Allergies  Cat's claw, Morphine, Other, and Hay fever and allergy relief    Review of Systems   All other systems reviewed and are negative.       Physical Exam  Constitutional:       Appearance: Normal appearance.   HENT:      Head: Normocephalic and atraumatic.   Eyes:      Extraocular Movements: Extraocular movements intact.      Pupils: Pupils are equal, round, and reactive to light.   Cardiovascular:      Rate and Rhythm: Normal rate and regular rhythm.   Pulmonary:      Effort: Pulmonary effort is normal.      Breath sounds: Normal breath sounds.   Abdominal:      General: Abdomen is flat.   Neurological:      Mental Status: She is oriented to person, place, and time. Mental status is at baseline.          Last Recorded Vitals  Blood pressure 116/84, pulse 75, temperature 36 °C (96.8 °F), temperature source Temporal, resp. rate 16, height 1.626 m (5' 4\"), weight 73.5 kg " (162 lb 0.6 oz), SpO2 97%.    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 female with history of macula-on retinal detachment in the right eye presenting for planned pars plana vitrectomy (PPV), scleral buckling procedure (SB), possible oil versus gas, possible endolaser in the right eye.     Plan to proceed with surgery as above.            Hernán Garcia MD

## 2024-09-13 NOTE — ANESTHESIA PROCEDURE NOTES
Airway  Date/Time: 9/13/2024 12:40 PM  Urgency: elective    Airway not difficult    Staffing  Performed: [facility]   Authorized by: Moiz Loja MD    Performed by: [facility] [place]  Patient location during procedure: OR    Indications and Patient Condition  Indications for airway management: anesthesia  Spontaneous Ventilation: absent  Sedation level: deep  Preoxygenated: yes  MILS not maintained throughout  Mask difficulty assessment: 1 - vent by mask    Final Airway Details  Final airway type: endotracheal airway      Successful airway: ETT  Cuffed: yes   Successful intubation technique: direct laryngoscopy  Facilitating devices/methods: intubating stylet  Endotracheal tube insertion site: oral  Blade: Estephania  Blade size: #3  ETT size (mm): 7.0  Cormack-Lehane Classification: grade I - full view of glottis  Placement verified by: capnometry   Cuff volume (mL): 8  Measured from: lips  ETT to lips (cm): 22  Number of attempts at approach: 1  Number of other approaches attempted: 0

## 2024-09-13 NOTE — ANESTHESIA PREPROCEDURE EVALUATION
Patient: Saskia Koehler    Procedure Information       Anesthesia Start Date/Time: 09/13/24 1230    Procedure: Vitrectomy Pars Plana (Right) - buckle oil    Location: [facility] OR 04 / Virtual [facility] OR    Surgeons: Fabrizio Bhakta MD            Relevant Problems   No relevant active problems       Clinical information reviewed:   Tobacco  Allergies  Meds   Med Hx  Surg Hx   Fam Hx  Soc Hx        NPO Detail:  NPO/Void Status  Carbohydrate Drink Given Prior to Surgery? : N  Date of Last Liquid: 09/12/24  Time of Last Liquid: 1800  Date of Last Solid: 09/12/24  Time of Last Solid: 1800  Last Intake Type: Clear fluids  Time of Last Void: 1042         Physical Exam    Airway  Mallampati: II  TM distance: >3 FB  Neck ROM: full     Cardiovascular - normal exam     Dental - normal exam     Pulmonary - normal exam     Abdominal            Anesthesia Plan    History of general anesthesia?: yes  History of complications of general anesthesia?: no    ASA 2     general     intravenous induction   Anesthetic plan and risks discussed with patient.    Plan discussed with CAA.

## 2024-09-14 ENCOUNTER — DOCUMENTATION (OUTPATIENT)
Dept: OPHTHALMOLOGY | Facility: HOSPITAL | Age: 64
End: 2024-09-14
Payer: MEDICARE

## 2024-09-14 ASSESSMENT — TONOMETRY
OD_IOP_MMHG: 20
IOP_METHOD: GOLDMANN APPLANATION

## 2024-09-14 ASSESSMENT — EXTERNAL EXAM - LEFT EYE: OS_EXAM: NORMAL

## 2024-09-14 ASSESSMENT — SLIT LAMP EXAM - LIDS
COMMENTS: NORMAL
COMMENTS: NORMAL

## 2024-09-14 ASSESSMENT — VISUAL ACUITY
OD_SC: CF 1 FT
METHOD: SNELLEN - LINEAR

## 2024-09-14 ASSESSMENT — CUP TO DISC RATIO
OS_RATIO: 0.3
OD_RATIO: 0.3

## 2024-09-14 ASSESSMENT — EXTERNAL EXAM - RIGHT EYE: OD_EXAM: NORMAL

## 2024-09-14 NOTE — PROGRESS NOTES
History Of Present Illness  Saskia Koehler is a 64 y.o. female presenting for POD1 status post (s/p) OD pars plana vitrectomy (PPV)/scleral buckling procedure (SB)/silicon oil fill 9/13/24 (Dr. Bhakta)  for macula-on retinal re- detachment in the setting of proliferative vitreoretinopathy (PVR).     POD1 Pt is reporting some discomfort, foreign body (FB) sensation. Kept patch and shield on overnight.     Past Medical History  She has a past medical history of Corneal abrasion, Diverticulosis, Eye trauma, Nephrolithiasis, and Retinal detachment.    Exam  Base Eye Exam       Visual Acuity (Snellen - Linear)         Right Left    Dist sc CF 1 ft     Dist ph sc no improvmentt               Tonometry (Goldmann Applanation, 12:45 PM)         Right Left    Pressure 20               Pupils         Dark    Right pharmacologically dilated    Left               Neuro/Psych       Oriented x3: Yes    Mood/Affect: Normal                  Slit Lamp and Fundus Exam       External Exam         Right Left    External Normal Normal              Slit Lamp Exam         Right Left    Lids/Lashes Normal Normal    Conjunctiva/Sclera Heaped conj with temporal and nasal suture visible, panfilo neg White and quiet    Cornea 1-2+ D folds centrally, superior 10-0 nylon, panfilo neg Clear    Anterior Chamber 2-3+ cell Deep and quiet    Iris 6mm pupil still pharmacologiclally dilated Round and reactive    Lens Scleral fixated intraocular lens (IOL) slightly inferiorly displaced still within visual axis Clear    Anterior Vitreous oil Normal              Fundus Exam         Right Left    Disc Normal Normal    C/D Ratio 0.3 0.3    Macula attached Normal    Vessels Normal Normal    Periphery 11 to 5 oclock superonasal bullous detachment posterior to buckle extending past nasal arcade. 2 oclock 2 disk diopter round preretinal hemorrhage with few subretinal hemorrhages extending inferiorly to 4 oclock. Few subretinal hemorrhages 10 to 12 oclock. 360  Buckle indentation. Normal                       Last Recorded Vitals  There were no vitals taken for this visit.    Relevant Results     Assessment/Plan     #OD Macula On Re-detachment status post (s/p) pars plana vitrectomy (PPV)/SB/SO (Livia) 9/13/24 with residual superonasal detachment  -Persistent superotemporal 11 to 5 oclock mac-on detachment posterior to indentation of SB, c/f  discussed with Dr. Bhakta differential could be hemorrhagic, vs oil, vs gas under retina causing detachment, but as macula is attached, does not require urgent intervention and can be monitored    -HOB elevated at all times including sleeping  -No bending, lifting, straining, excercize  -Sleep with shield, safety glasses for daytime use  -Start pred QID OD  -Start moxi QID OD  -Return precautions including retinal detachment (RD) and endophth discussed  -Pt to keep Thurdsay 9/18 appt with Dr. Livia Osborne MD  PGY-4 Ophthalmology     Pt was discussed with attending Dr. Bhakta who agrees with assessment and plan as above.    Ophthalmology Adult Pager - [phone]  Ophthalmology Pediatrics Pager - [phone]    For adult follow-up appointments, call: [phone]  For pediatric follow-up appointments, call: [phone]      NOTE: This note is not finalized until attending reviews and signs.

## 2024-09-19 ENCOUNTER — APPOINTMENT (OUTPATIENT)
Dept: OPHTHALMOLOGY | Facility: CLINIC | Age: 64
End: 2024-09-19
Payer: MEDICARE

## 2024-09-19 DIAGNOSIS — H40.051 ELEVATED IOP, RIGHT: ICD-10-CM

## 2024-09-19 DIAGNOSIS — H40.051 ELEVATED IOP, RIGHT: Primary | ICD-10-CM

## 2024-09-19 DIAGNOSIS — H33.001 MACULA-ON RHEGMATOGENOUS RETINAL DETACHMENT OF RIGHT EYE: Primary | ICD-10-CM

## 2024-09-19 PROCEDURE — 92134 CPTRZ OPH DX IMG PST SGM RTA: CPT | Performed by: OPHTHALMOLOGY

## 2024-09-19 PROCEDURE — 99024 POSTOP FOLLOW-UP VISIT: CPT | Performed by: OPHTHALMOLOGY

## 2024-09-19 RX ORDER — DORZOLAMIDE HYDROCHLORIDE AND TIMOLOL MALEATE 20; 5 MG/ML; MG/ML
1 SOLUTION/ DROPS OPHTHALMIC 2 TIMES DAILY
Qty: 15 ML | Refills: 2 | Status: SHIPPED | OUTPATIENT
Start: 2024-09-19 | End: 2024-10-19

## 2024-09-19 RX ORDER — BRIMONIDINE TARTRATE 2 MG/ML
1 SOLUTION/ DROPS OPHTHALMIC 2 TIMES DAILY
Qty: 30 ML | Refills: 3 | Status: SHIPPED | OUTPATIENT
Start: 2024-09-19 | End: 2025-09-19

## 2024-09-19 ASSESSMENT — TONOMETRY
OS_IOP_MMHG: 15
OD_IOP_MMHG: 10
IOP_METHOD: TONOPEN

## 2024-09-19 ASSESSMENT — VISUAL ACUITY
OD_SC: CF@1FOOT
METHOD: SNELLEN - LINEAR
OS_SC: 20/25
OS_SC+: -1

## 2024-09-19 ASSESSMENT — CUP TO DISC RATIO
OD_RATIO: 0.3
OS_RATIO: 0.3

## 2024-09-19 ASSESSMENT — ENCOUNTER SYMPTOMS
ENDOCRINE NEGATIVE: 0
CARDIOVASCULAR NEGATIVE: 0
RESPIRATORY NEGATIVE: 0
MUSCULOSKELETAL NEGATIVE: 0
PSYCHIATRIC NEGATIVE: 0
NEUROLOGICAL NEGATIVE: 0
GASTROINTESTINAL NEGATIVE: 0
ALLERGIC/IMMUNOLOGIC NEGATIVE: 0
CONSTITUTIONAL NEGATIVE: 0
HEMATOLOGIC/LYMPHATIC NEGATIVE: 0
EYES NEGATIVE: 1

## 2024-09-19 ASSESSMENT — EXTERNAL EXAM - LEFT EYE: OS_EXAM: NORMAL

## 2024-09-19 ASSESSMENT — EXTERNAL EXAM - RIGHT EYE: OD_EXAM: NORMAL

## 2024-09-19 ASSESSMENT — SLIT LAMP EXAM - LIDS
COMMENTS: NORMAL
COMMENTS: NORMAL

## 2024-09-19 NOTE — PROGRESS NOTES
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

## 2024-09-26 ENCOUNTER — APPOINTMENT (OUTPATIENT)
Dept: OPHTHALMOLOGY | Facility: CLINIC | Age: 64
End: 2024-09-26
Payer: MEDICARE

## 2024-09-26 DIAGNOSIS — H33.001 MACULA-ON RHEGMATOGENOUS RETINAL DETACHMENT OF RIGHT EYE: Primary | ICD-10-CM

## 2024-09-26 PROCEDURE — 99024 POSTOP FOLLOW-UP VISIT: CPT | Performed by: OPHTHALMOLOGY

## 2024-09-26 PROCEDURE — 92250 FUNDUS PHOTOGRAPHY W/I&R: CPT | Mod: RIGHT SIDE | Performed by: OPHTHALMOLOGY

## 2024-09-26 ASSESSMENT — ENCOUNTER SYMPTOMS
ALLERGIC/IMMUNOLOGIC NEGATIVE: 0
CARDIOVASCULAR NEGATIVE: 0
NEUROLOGICAL NEGATIVE: 0
GASTROINTESTINAL NEGATIVE: 0
HEMATOLOGIC/LYMPHATIC NEGATIVE: 0
CONSTITUTIONAL NEGATIVE: 0
PSYCHIATRIC NEGATIVE: 0
ENDOCRINE NEGATIVE: 0
MUSCULOSKELETAL NEGATIVE: 0
EYES NEGATIVE: 1
RESPIRATORY NEGATIVE: 0

## 2024-09-26 ASSESSMENT — VISUAL ACUITY
CORRECTION_TYPE: GLASSES
METHOD: SNELLEN - LINEAR
OS_CC: 20/30
OD_CC: 20/400

## 2024-09-26 ASSESSMENT — SLIT LAMP EXAM - LIDS
COMMENTS: NORMAL
COMMENTS: NORMAL

## 2024-09-26 ASSESSMENT — EXTERNAL EXAM - LEFT EYE: OS_EXAM: NORMAL

## 2024-09-26 ASSESSMENT — CUP TO DISC RATIO
OD_RATIO: 0.3
OS_RATIO: 0.3

## 2024-09-26 ASSESSMENT — TONOMETRY
IOP_METHOD: GOLDMANN APPLANATION
OD_IOP_MMHG: 22

## 2024-09-26 ASSESSMENT — CONF VISUAL FIELD
OS_INFERIOR_NASAL_RESTRICTION: 0
OD_SUPERIOR_NASAL_RESTRICTION: 0
OD_INFERIOR_TEMPORAL_RESTRICTION: 0
OD_INFERIOR_NASAL_RESTRICTION: 0
OS_SUPERIOR_NASAL_RESTRICTION: 0
OS_INFERIOR_TEMPORAL_RESTRICTION: 0
OD_NORMAL: 1
OD_SUPERIOR_TEMPORAL_RESTRICTION: 0
OS_NORMAL: 1
OS_SUPERIOR_TEMPORAL_RESTRICTION: 0

## 2024-09-26 ASSESSMENT — EXTERNAL EXAM - RIGHT EYE: OD_EXAM: NORMAL

## 2024-09-26 NOTE — PROGRESS NOTES
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  IOP improved to 14     Retina appears detached w proliferative vitreoretinopathy (PVR) right    Continue diamox 500mg BID   brimonidine BID right eye  Cosopt bif  PF BiD (iop hi)                  POW#7- today vision 20/200 PH 20/80, intraocular pressure (IOP) WNL 16, retina appears attached, 10% air bubble  New cystoid macular edema (CME) today   Proliferative vitreoretinopathy (PVR)  RRD right    Intraocular pressure (IOP) is 22 cosopt alphagan bid right  Diamox  500 BID PF BID    2week

## 2024-10-02 ENCOUNTER — APPOINTMENT (OUTPATIENT)
Dept: PRIMARY CARE | Facility: CLINIC | Age: 64
End: 2024-10-02
Payer: MEDICARE

## 2024-10-02 VITALS
HEIGHT: 64 IN | RESPIRATION RATE: 16 BRPM | OXYGEN SATURATION: 99 % | BODY MASS INDEX: 26.63 KG/M2 | HEART RATE: 64 BPM | SYSTOLIC BLOOD PRESSURE: 110 MMHG | DIASTOLIC BLOOD PRESSURE: 78 MMHG | WEIGHT: 156 LBS

## 2024-10-02 DIAGNOSIS — Z00.00 WELCOME TO MEDICARE PREVENTIVE VISIT: ICD-10-CM

## 2024-10-02 DIAGNOSIS — E78.5 HYPERLIPIDEMIA, UNSPECIFIED HYPERLIPIDEMIA TYPE: ICD-10-CM

## 2024-10-02 DIAGNOSIS — G89.29 CHRONIC SI JOINT PAIN: ICD-10-CM

## 2024-10-02 DIAGNOSIS — Z12.4 CERVICAL CANCER SCREENING: ICD-10-CM

## 2024-10-02 DIAGNOSIS — Z23 IMMUNIZATION DUE: Primary | ICD-10-CM

## 2024-10-02 DIAGNOSIS — M53.3 CHRONIC SI JOINT PAIN: ICD-10-CM

## 2024-10-02 PROCEDURE — 1159F MED LIST DOCD IN RCRD: CPT | Performed by: FAMILY MEDICINE

## 2024-10-02 PROCEDURE — 87624 HPV HI-RISK TYP POOLED RSLT: CPT

## 2024-10-02 PROCEDURE — 1158F ADVNC CARE PLAN TLK DOCD: CPT | Performed by: FAMILY MEDICINE

## 2024-10-02 PROCEDURE — 93000 ELECTROCARDIOGRAM COMPLETE: CPT | Performed by: FAMILY MEDICINE

## 2024-10-02 PROCEDURE — 90662 IIV NO PRSV INCREASED AG IM: CPT | Performed by: FAMILY MEDICINE

## 2024-10-02 PROCEDURE — 3008F BODY MASS INDEX DOCD: CPT | Performed by: FAMILY MEDICINE

## 2024-10-02 PROCEDURE — G0438 PPPS, INITIAL VISIT: HCPCS | Performed by: FAMILY MEDICINE

## 2024-10-02 PROCEDURE — 1170F FXNL STATUS ASSESSED: CPT | Performed by: FAMILY MEDICINE

## 2024-10-02 PROCEDURE — 1123F ACP DISCUSS/DSCN MKR DOCD: CPT | Performed by: FAMILY MEDICINE

## 2024-10-02 PROCEDURE — G0008 ADMIN INFLUENZA VIRUS VAC: HCPCS | Performed by: FAMILY MEDICINE

## 2024-10-02 PROCEDURE — 1036F TOBACCO NON-USER: CPT | Performed by: FAMILY MEDICINE

## 2024-10-02 PROCEDURE — 88175 CYTOPATH C/V AUTO FLUID REDO: CPT

## 2024-10-02 PROCEDURE — G0101 CA SCREEN;PELVIC/BREAST EXAM: HCPCS | Performed by: FAMILY MEDICINE

## 2024-10-02 ASSESSMENT — ENCOUNTER SYMPTOMS
DEPRESSION: 0
LOSS OF SENSATION IN FEET: 0
OCCASIONAL FEELINGS OF UNSTEADINESS: 0

## 2024-10-02 ASSESSMENT — ACTIVITIES OF DAILY LIVING (ADL)
DOING_HOUSEWORK: INDEPENDENT
BATHING: INDEPENDENT
TAKING_MEDICATION: INDEPENDENT
GROCERY_SHOPPING: INDEPENDENT
DRESSING: INDEPENDENT
MANAGING_FINANCES: INDEPENDENT

## 2024-10-02 ASSESSMENT — PATIENT HEALTH QUESTIONNAIRE - PHQ9
5. POOR APPETITE OR OVEREATING: MORE THAN HALF THE DAYS
2. FEELING DOWN, DEPRESSED OR HOPELESS: MORE THAN HALF THE DAYS
1. LITTLE INTEREST OR PLEASURE IN DOING THINGS: MORE THAN HALF THE DAYS
SUM OF ALL RESPONSES TO PHQ QUESTIONS 1-9: 14
9. THOUGHTS THAT YOU WOULD BE BETTER OFF DEAD, OR OF HURTING YOURSELF: NOT AT ALL
6. FEELING BAD ABOUT YOURSELF - OR THAT YOU ARE A FAILURE OR HAVE LET YOURSELF OR YOUR FAMILY DOWN: MORE THAN HALF THE DAYS
4. FEELING TIRED OR HAVING LITTLE ENERGY: MORE THAN HALF THE DAYS
SUM OF ALL RESPONSES TO PHQ9 QUESTIONS 1 AND 2: 4
3. TROUBLE FALLING OR STAYING ASLEEP OR SLEEPING TOO MUCH: MORE THAN HALF THE DAYS
8. MOVING OR SPEAKING SO SLOWLY THAT OTHER PEOPLE COULD HAVE NOTICED. OR THE OPPOSITE, BEING SO FIGETY OR RESTLESS THAT YOU HAVE BEEN MOVING AROUND A LOT MORE THAN USUAL: NOT AT ALL
7. TROUBLE CONCENTRATING ON THINGS, SUCH AS READING THE NEWSPAPER OR WATCHING TELEVISION: MORE THAN HALF THE DAYS
10. IF YOU CHECKED OFF ANY PROBLEMS, HOW DIFFICULT HAVE THESE PROBLEMS MADE IT FOR YOU TO DO YOUR WORK, TAKE CARE OF THINGS AT HOME, OR GET ALONG WITH OTHER PEOPLE: NOT DIFFICULT AT ALL

## 2024-10-02 NOTE — PROGRESS NOTES
"Subjective   Saskia Koehler is a 64 y.o. female who presents for Welcome To Medicare.  HPI  PMH:  Spinal stenosis sp fusion, titanium rosemarie 2003  Colon polyps 11/2022 rpt 5 yr   Shingrix/PNA UTD   NIL neg HPV 5/2021  R eye injury-multiple eye Sx 4/2024 x 3   DEXA nml 9/2024   Ca score 0 2017     Had another detached retina surg 2 wk ago. She's had 3 surgeries.     Due for pap     Saw ortho for R hip pain OA. Thought more SI joint issues and will do PT after eye heals.     Usually goes to [place] in nov.       Current Outpatient Medications on File Prior to Visit   Medication Sig Dispense Refill    bimatoprost (Lumigan) 0.01 % ophthalmic solution Administer 1 drop into the right eye once daily at bedtime. 7.5 mL 3    brimonidine (AlphaGAN) 0.2 % ophthalmic solution Administer 1 drop into the right eye 2 times a day. 30 mL 3    dorzolamide-timoloL (Cosopt) 22.3-6.8 mg/mL ophthalmic solution       dorzolamide-timoloL (Cosopt) 22.3-6.8 mg/mL ophthalmic solution Administer 1 drop into the right eye 2 times a day. 15 mL 2    meloxicam (Mobic) 7.5 mg tablet Take 1 tablet (7.5 mg) by mouth once daily. 30 tablet 11    prednisoLONE acetate (Pred-Forte) 1 % ophthalmic suspension Administer 1 drop into the right eye 4 times a day. 10 mL 1     No current facility-administered medications on file prior to visit.                  Objective   /78 (BP Location: Left arm)   Pulse 64   Resp 16   Ht 1.626 m (5' 4\")   Wt 70.8 kg (156 lb)   SpO2 99%   BMI 26.78 kg/m²    Physical Exam  General: NAD  HEENT:NCAT, R eye swelling/erythema, nml OP, b/l TM clear, patent nares   Neck: no cervical TRINH  Heart: RRR no murmur, no edema   Lungs: CTA b/l, no wheeze or rhonchi   GI: abd soft, nontender, nondistended.   Breast: symmetric, no masses, rashes, lesions, axillary TRINH  GYN: no external lesions, normal vaginal mucosa, cervix visualized. uterus mobile/nontender. no adnexal masses. Bladder prolapse noted   MSK: no c/c/e. nml gait   Skin: " warm and dry  Psych: cooperative, appropriate affect  Neuro: speech clear. A&Ox3  3/3 5 min objet recall   Assessment/Plan   Problem List Items Addressed This Visit    None  Visit Diagnoses       Welcome to Medicare preventive visit    -  Primary  overall doing well. Cont balanced diet/reg ex as indicated  BMI: 26  VACC: reviewed PNA/shingrix/flu UTD consider covid. No RSV needed  COLON CA SCRN: UTD  LABS: reviewed w pt at goal besides lipids  PAP: today   MAMMO: UTD  DEXA: UTD   EKG NSR   RTC yearly or prn     Relevant Orders    ECG 12 lead (Clinic Performed)    Cervical cancer screening        Relevant Orders    THINPREP PAP TEST    Hyperlipidemia, unspecified hyperlipidemia type    Ca score 0 2017  Rpt Ca score in setting of inc lipids   Labs 1 yr  ASCVD 6% statin not indicated       Relevant Orders    CT cardiac scoring wo IV contrast    CBC and Auto Differential    Comprehensive Metabolic Panel    Lipid Panel    Chronic SI joint pain      Saw ortho  Cont meloxicam RF when needed  Rec PT

## 2024-10-08 ENCOUNTER — APPOINTMENT (OUTPATIENT)
Dept: OPHTHALMOLOGY | Facility: CLINIC | Age: 64
End: 2024-10-08
Payer: MEDICARE

## 2024-10-08 DIAGNOSIS — H33.001 MACULA-ON RHEGMATOGENOUS RETINAL DETACHMENT OF RIGHT EYE: Primary | ICD-10-CM

## 2024-10-08 PROCEDURE — 99024 POSTOP FOLLOW-UP VISIT: CPT | Performed by: OPHTHALMOLOGY

## 2024-10-08 PROCEDURE — 92134 CPTRZ OPH DX IMG PST SGM RTA: CPT | Performed by: OPHTHALMOLOGY

## 2024-10-08 RX ORDER — ACETAZOLAMIDE 500 MG/1
500 CAPSULE, EXTENDED RELEASE ORAL 2 TIMES DAILY
Qty: 60 CAPSULE | Refills: 0 | Status: SHIPPED | OUTPATIENT
Start: 2024-10-08 | End: 2024-11-07

## 2024-10-08 ASSESSMENT — VISUAL ACUITY
OS_SC+: -1
OS_SC: 20/25
METHOD: SNELLEN - LINEAR

## 2024-10-08 ASSESSMENT — ENCOUNTER SYMPTOMS
EYES NEGATIVE: 1
NEUROLOGICAL NEGATIVE: 0
CARDIOVASCULAR NEGATIVE: 0
ALLERGIC/IMMUNOLOGIC NEGATIVE: 0
MUSCULOSKELETAL NEGATIVE: 0
RESPIRATORY NEGATIVE: 0
GASTROINTESTINAL NEGATIVE: 0
ENDOCRINE NEGATIVE: 0
HEMATOLOGIC/LYMPHATIC NEGATIVE: 0
PSYCHIATRIC NEGATIVE: 0
CONSTITUTIONAL NEGATIVE: 0

## 2024-10-08 ASSESSMENT — TONOMETRY
OS_IOP_MMHG: 12
IOP_METHOD: GOLDMANN APPLANATION
OD_IOP_MMHG: 16

## 2024-10-08 ASSESSMENT — CONF VISUAL FIELD
OD_SUPERIOR_TEMPORAL_RESTRICTION: 0
OS_INFERIOR_NASAL_RESTRICTION: 0
OD_NORMAL: 1
OD_INFERIOR_NASAL_RESTRICTION: 0
OS_NORMAL: 1
OS_SUPERIOR_TEMPORAL_RESTRICTION: 0
OD_SUPERIOR_NASAL_RESTRICTION: 0
OS_SUPERIOR_NASAL_RESTRICTION: 0
OD_INFERIOR_TEMPORAL_RESTRICTION: 0
OS_INFERIOR_TEMPORAL_RESTRICTION: 0

## 2024-10-08 ASSESSMENT — SLIT LAMP EXAM - LIDS
COMMENTS: NORMAL
COMMENTS: NORMAL

## 2024-10-08 ASSESSMENT — CUP TO DISC RATIO
OS_RATIO: 0.3
OD_RATIO: 0.3

## 2024-10-08 ASSESSMENT — EXTERNAL EXAM - LEFT EYE: OS_EXAM: NORMAL

## 2024-10-08 ASSESSMENT — EXTERNAL EXAM - RIGHT EYE: OD_EXAM: NORMAL

## 2024-10-08 NOTE — PROGRESS NOTES
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  IOP improved to 14     Retina appears detached w proliferative vitreoretinopathy (PVR) right    Decrease to diamox 500mg daily (from BID)  brimonidine BID right eye  Cosopt bif  PF BiD (iop hi)                  POW#7- today vision 20/200 PH 20/80, intraocular pressure (IOP) WNL 16, retina appears attached, 10% air bubble  New cystoid macular edema (CME) today   Proliferative vitreoretinopathy (PVR)  RRD right    Intraocular pressure (IOP) is 16 cosopt alphagan bid right lumigan  Diamox  500 BQ PF BID    2week

## 2024-10-15 LAB
CYTOLOGY CMNT CVX/VAG CYTO-IMP: NORMAL
HPV HR 12 DNA GENITAL QL NAA+PROBE: NEGATIVE
HPV HR GENOTYPES PNL CVX NAA+PROBE: NEGATIVE
HPV16 DNA SPEC QL NAA+PROBE: NEGATIVE
HPV18 DNA SPEC QL NAA+PROBE: NEGATIVE
LAB AP HPV GENOTYPE QUESTION: YES
LAB AP HPV HR: NORMAL
LABORATORY COMMENT REPORT: NORMAL
PATH REPORT.TOTAL CANCER: NORMAL

## 2024-10-24 ENCOUNTER — PREP FOR PROCEDURE (OUTPATIENT)
Dept: OPHTHALMOLOGY | Facility: CLINIC | Age: 64
End: 2024-10-24

## 2024-10-24 ENCOUNTER — APPOINTMENT (OUTPATIENT)
Dept: OPHTHALMOLOGY | Facility: CLINIC | Age: 64
End: 2024-10-24
Payer: MEDICARE

## 2024-10-24 DIAGNOSIS — H35.21 PROLIFERATIVE VITREORETINOPATHY OF RIGHT EYE: ICD-10-CM

## 2024-10-24 DIAGNOSIS — H33.41 RETINAL DETACHMENT, TRACTIONAL, RIGHT: Primary | ICD-10-CM

## 2024-10-24 DIAGNOSIS — H33.001 MACULA-ON RHEGMATOGENOUS RETINAL DETACHMENT OF RIGHT EYE: Primary | ICD-10-CM

## 2024-10-24 RX ORDER — TETRACAINE HYDROCHLORIDE 5 MG/ML
1 SOLUTION OPHTHALMIC ONCE
OUTPATIENT
Start: 2024-11-07

## 2024-10-24 RX ORDER — TROPICAMIDE 10 MG/ML
1 SOLUTION/ DROPS OPHTHALMIC
OUTPATIENT
Start: 2024-11-07 | End: 2024-11-07

## 2024-10-24 RX ORDER — PHENYLEPHRINE HYDROCHLORIDE 25 MG/ML
1 SOLUTION/ DROPS OPHTHALMIC
OUTPATIENT
Start: 2024-11-07 | End: 2024-11-07

## 2024-10-24 ASSESSMENT — VISUAL ACUITY
OD_PH_SC+: +1
OD_PH_SC: 20/400
OD_SC: CF@4FOOT
OS_PH_SC: 20/20
METHOD: SNELLEN - LINEAR
OS_PH_SC+: -2

## 2024-10-24 ASSESSMENT — ENCOUNTER SYMPTOMS
ALLERGIC/IMMUNOLOGIC NEGATIVE: 0
NEUROLOGICAL NEGATIVE: 0
RESPIRATORY NEGATIVE: 0
CONSTITUTIONAL NEGATIVE: 0
CARDIOVASCULAR NEGATIVE: 0
MUSCULOSKELETAL NEGATIVE: 0
ENDOCRINE NEGATIVE: 0
PSYCHIATRIC NEGATIVE: 0
GASTROINTESTINAL NEGATIVE: 0
HEMATOLOGIC/LYMPHATIC NEGATIVE: 0
EYES NEGATIVE: 1

## 2024-10-24 ASSESSMENT — TONOMETRY
OD_IOP_MMHG: 14
OS_IOP_MMHG: 14
IOP_METHOD: TONOPEN

## 2024-10-25 ASSESSMENT — SLIT LAMP EXAM - LIDS
COMMENTS: NORMAL
COMMENTS: NORMAL

## 2024-10-25 ASSESSMENT — EXTERNAL EXAM - LEFT EYE: OS_EXAM: NORMAL

## 2024-10-25 ASSESSMENT — CUP TO DISC RATIO
OD_RATIO: 0.3
OS_RATIO: 0.3

## 2024-10-25 ASSESSMENT — EXTERNAL EXAM - RIGHT EYE: OD_EXAM: NORMAL

## 2024-10-29 RX ORDER — PREDNISOLONE ACETATE 10 MG/ML
SUSPENSION/ DROPS OPHTHALMIC
COMMUNITY
Start: 2024-09-13

## 2024-10-29 RX ORDER — BIMATOPROST 0.1 MG/ML
SOLUTION/ DROPS OPHTHALMIC
COMMUNITY
Start: 2024-09-26

## 2024-11-05 ENCOUNTER — APPOINTMENT (OUTPATIENT)
Dept: OPHTHALMOLOGY | Facility: CLINIC | Age: 64
End: 2024-11-05
Payer: MEDICARE

## 2024-11-05 ENCOUNTER — ANESTHESIA EVENT (OUTPATIENT)
Dept: OPERATING ROOM | Facility: CLINIC | Age: 64
End: 2024-11-05
Payer: MEDICARE

## 2024-11-05 DIAGNOSIS — H40.051 ELEVATED IOP, RIGHT: Primary | ICD-10-CM

## 2024-11-05 DIAGNOSIS — H35.21 PROLIFERATIVE VITREORETINOPATHY OF RIGHT EYE: ICD-10-CM

## 2024-11-05 DIAGNOSIS — H33.41 RETINAL DETACHMENT, TRACTIONAL, RIGHT: ICD-10-CM

## 2024-11-05 DIAGNOSIS — H33.001 MACULA-ON RHEGMATOGENOUS RETINAL DETACHMENT OF RIGHT EYE: ICD-10-CM

## 2024-11-05 DIAGNOSIS — H35.351 CYSTOID MACULAR EDEMA OF RIGHT EYE: ICD-10-CM

## 2024-11-05 PROCEDURE — 99213 OFFICE O/P EST LOW 20 MIN: CPT | Performed by: OPHTHALMOLOGY

## 2024-11-05 PROCEDURE — 92134 CPTRZ OPH DX IMG PST SGM RTA: CPT | Performed by: OPHTHALMOLOGY

## 2024-11-05 RX ORDER — DORZOLAMIDE HYDROCHLORIDE AND TIMOLOL MALEATE 20; 5 MG/ML; MG/ML
1 SOLUTION/ DROPS OPHTHALMIC 2 TIMES DAILY
Qty: 5 ML | Refills: 3 | Status: SHIPPED | OUTPATIENT
Start: 2024-11-05

## 2024-11-05 RX ORDER — BRIMONIDINE TARTRATE 2 MG/ML
1 SOLUTION/ DROPS OPHTHALMIC 2 TIMES DAILY
Qty: 10 ML | Refills: 3 | Status: SHIPPED | OUTPATIENT
Start: 2024-11-05 | End: 2025-11-05

## 2024-11-05 ASSESSMENT — VISUAL ACUITY
OD_SC: CF 3 FEET
OS_CC: 20/20
METHOD: SNELLEN - LINEAR
OD_PH_SC: 20/400

## 2024-11-05 ASSESSMENT — CUP TO DISC RATIO
OD_RATIO: 0.3
OS_RATIO: 0.3

## 2024-11-05 ASSESSMENT — SLIT LAMP EXAM - LIDS
COMMENTS: NORMAL
COMMENTS: NORMAL

## 2024-11-05 ASSESSMENT — TONOMETRY
IOP_METHOD: GOLDMANN APPLANATION
OD_IOP_MMHG: 12
OS_IOP_MMHG: 14

## 2024-11-05 ASSESSMENT — EXTERNAL EXAM - RIGHT EYE: OD_EXAM: NORMAL

## 2024-11-05 ASSESSMENT — EXTERNAL EXAM - LEFT EYE: OS_EXAM: NORMAL

## 2024-11-05 NOTE — PROGRESS NOTES
Assessment/Plan   Diagnoses and all orders for this visit:  Elevated IOP, right  -     brimonidine (AlphaGAN) 0.2 % ophthalmic solution; Administer 1 drop into the right eye 2 times a day.  -     dorzolamide-timoloL (Cosopt) 22.3-6.8 mg/mL ophthalmic solution; Administer 1 drop into the right eye 2 times a day.  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  IOP improved to 14     Retina appears detached w proliferative vitreoretinopathy (PVR) right    Decrease to diamox 500mg daily (from BID)  brimonidine BID right eye  Cosopt bif  PF BiD (iop hi)                  POW#7- today vision 20/200 PH 20/80, intraocular pressure (IOP) WNL 16, retina appears attached, 10% air bubble  New cystoid macular edema (CME) today   Proliferative vitreoretinopathy (PVR)  RRD right    Intraocular pressure (IOP) is 14 cosopt alphagan bid right lumigan  Diamox  500 BQ PF BID    Inflammation has improved, will plan to proceed with pars plana vitrectomy (PPV)/membrane peel/retinectomy, endolaser (EL), silicone oil exchange OD 11/7/24    POD#1

## 2024-11-07 ENCOUNTER — HOSPITAL ENCOUNTER (OUTPATIENT)
Facility: CLINIC | Age: 64
Setting detail: OUTPATIENT SURGERY
Discharge: HOME | End: 2024-11-07
Attending: OPHTHALMOLOGY | Admitting: OPHTHALMOLOGY
Payer: MEDICARE

## 2024-11-07 ENCOUNTER — ANESTHESIA (OUTPATIENT)
Dept: OPERATING ROOM | Facility: CLINIC | Age: 64
End: 2024-11-07
Payer: MEDICARE

## 2024-11-07 VITALS
HEART RATE: 59 BPM | WEIGHT: 158.73 LBS | DIASTOLIC BLOOD PRESSURE: 57 MMHG | TEMPERATURE: 97 F | RESPIRATION RATE: 16 BRPM | BODY MASS INDEX: 27.1 KG/M2 | HEIGHT: 64 IN | SYSTOLIC BLOOD PRESSURE: 98 MMHG | OXYGEN SATURATION: 97 %

## 2024-11-07 DIAGNOSIS — H35.21 PROLIFERATIVE VITREORETINOPATHY OF RIGHT EYE: ICD-10-CM

## 2024-11-07 DIAGNOSIS — H33.41 RETINAL DETACHMENT, TRACTIONAL, RIGHT: Primary | ICD-10-CM

## 2024-11-07 PROCEDURE — 3700000002 HC GENERAL ANESTHESIA TIME - EACH INCREMENTAL 1 MINUTE: Performed by: OPHTHALMOLOGY

## 2024-11-07 PROCEDURE — 2500000002 HC RX 250 W HCPCS SELF ADMINISTERED DRUGS (ALT 637 FOR MEDICARE OP, ALT 636 FOR OP/ED): Performed by: ANESTHESIOLOGY

## 2024-11-07 PROCEDURE — 3600000008 HC OR TIME - EACH INCREMENTAL 1 MINUTE - PROCEDURE LEVEL THREE: Performed by: OPHTHALMOLOGY

## 2024-11-07 PROCEDURE — 2500000004 HC RX 250 GENERAL PHARMACY W/ HCPCS (ALT 636 FOR OP/ED): Mod: JG | Performed by: OPHTHALMOLOGY

## 2024-11-07 PROCEDURE — 2720000007 HC OR 272 NO HCPCS: Performed by: OPHTHALMOLOGY

## 2024-11-07 PROCEDURE — 7100000010 HC PHASE TWO TIME - EACH INCREMENTAL 1 MINUTE: Performed by: OPHTHALMOLOGY

## 2024-11-07 PROCEDURE — 2500000004 HC RX 250 GENERAL PHARMACY W/ HCPCS (ALT 636 FOR OP/ED): Performed by: ANESTHESIOLOGY

## 2024-11-07 PROCEDURE — 2500000004 HC RX 250 GENERAL PHARMACY W/ HCPCS (ALT 636 FOR OP/ED): Performed by: NURSE ANESTHETIST, CERTIFIED REGISTERED

## 2024-11-07 PROCEDURE — 3600000003 HC OR TIME - INITIAL BASE CHARGE - PROCEDURE LEVEL THREE: Performed by: OPHTHALMOLOGY

## 2024-11-07 PROCEDURE — 2500000004 HC RX 250 GENERAL PHARMACY W/ HCPCS (ALT 636 FOR OP/ED): Performed by: OPHTHALMOLOGY

## 2024-11-07 PROCEDURE — C1814 RETINAL TAMP, SILICONE OIL: HCPCS | Performed by: OPHTHALMOLOGY

## 2024-11-07 PROCEDURE — 2780000003 HC OR 278 NO HCPCS: Performed by: OPHTHALMOLOGY

## 2024-11-07 PROCEDURE — 67113 REPAIR RETINAL DETACH CPLX: CPT | Performed by: STUDENT IN AN ORGANIZED HEALTH CARE EDUCATION/TRAINING PROGRAM

## 2024-11-07 PROCEDURE — 7100000009 HC PHASE TWO TIME - INITIAL BASE CHARGE: Performed by: OPHTHALMOLOGY

## 2024-11-07 PROCEDURE — 2500000005 HC RX 250 GENERAL PHARMACY W/O HCPCS: Performed by: OPHTHALMOLOGY

## 2024-11-07 PROCEDURE — 3700000001 HC GENERAL ANESTHESIA TIME - INITIAL BASE CHARGE: Performed by: OPHTHALMOLOGY

## 2024-11-07 PROCEDURE — 67113 REPAIR RETINAL DETACH CPLX: CPT | Performed by: OPHTHALMOLOGY

## 2024-11-07 PROCEDURE — 2500000005 HC RX 250 GENERAL PHARMACY W/O HCPCS: Performed by: STUDENT IN AN ORGANIZED HEALTH CARE EDUCATION/TRAINING PROGRAM

## 2024-11-07 DEVICE — IMPLANTABLE DEVICE: Type: IMPLANTABLE DEVICE | Site: EYE | Status: FUNCTIONAL

## 2024-11-07 RX ORDER — TROPICAMIDE 10 MG/ML
1 SOLUTION/ DROPS OPHTHALMIC
Status: COMPLETED | OUTPATIENT
Start: 2024-11-07 | End: 2024-11-07

## 2024-11-07 RX ORDER — PROPOFOL 10 MG/ML
INJECTION, EMULSION INTRAVENOUS AS NEEDED
Status: DISCONTINUED | OUTPATIENT
Start: 2024-11-07 | End: 2024-11-07

## 2024-11-07 RX ORDER — APREPITANT 40 MG/1
40 CAPSULE ORAL ONCE
Status: COMPLETED | OUTPATIENT
Start: 2024-11-07 | End: 2024-11-07

## 2024-11-07 RX ORDER — FENTANYL CITRATE 50 UG/ML
INJECTION, SOLUTION INTRAMUSCULAR; INTRAVENOUS AS NEEDED
Status: DISCONTINUED | OUTPATIENT
Start: 2024-11-07 | End: 2024-11-07

## 2024-11-07 RX ORDER — METOCLOPRAMIDE HYDROCHLORIDE 5 MG/ML
10 INJECTION INTRAMUSCULAR; INTRAVENOUS ONCE AS NEEDED
Status: DISCONTINUED | OUTPATIENT
Start: 2024-11-07 | End: 2024-11-07 | Stop reason: HOSPADM

## 2024-11-07 RX ORDER — ONDANSETRON HYDROCHLORIDE 2 MG/ML
4 INJECTION, SOLUTION INTRAVENOUS ONCE AS NEEDED
Status: DISCONTINUED | OUTPATIENT
Start: 2024-11-07 | End: 2024-11-07 | Stop reason: HOSPADM

## 2024-11-07 RX ORDER — SCOLOPAMINE TRANSDERMAL SYSTEM 1 MG/1
1 PATCH, EXTENDED RELEASE TRANSDERMAL
Status: DISCONTINUED | OUTPATIENT
Start: 2024-11-07 | End: 2024-11-07 | Stop reason: HOSPADM

## 2024-11-07 RX ORDER — PHENYLEPHRINE HYDROCHLORIDE 25 MG/ML
1 SOLUTION/ DROPS OPHTHALMIC
Status: COMPLETED | OUTPATIENT
Start: 2024-11-07 | End: 2024-11-07

## 2024-11-07 RX ORDER — ALBUTEROL SULFATE 0.83 MG/ML
2.5 SOLUTION RESPIRATORY (INHALATION) ONCE AS NEEDED
Status: DISCONTINUED | OUTPATIENT
Start: 2024-11-07 | End: 2024-11-07 | Stop reason: HOSPADM

## 2024-11-07 RX ORDER — BUPIVACAINE HYDROCHLORIDE 7.5 MG/ML
INJECTION, SOLUTION EPIDURAL; RETROBULBAR AS NEEDED
Status: DISCONTINUED | OUTPATIENT
Start: 2024-11-07 | End: 2024-11-07 | Stop reason: HOSPADM

## 2024-11-07 RX ORDER — TETRACAINE HYDROCHLORIDE 5 MG/ML
1 SOLUTION OPHTHALMIC ONCE
Status: COMPLETED | OUTPATIENT
Start: 2024-11-07 | End: 2024-11-07

## 2024-11-07 RX ORDER — SODIUM CHLORIDE 0.9 % (FLUSH) 0.9 %
SYRINGE (ML) INJECTION AS NEEDED
Status: DISCONTINUED | OUTPATIENT
Start: 2024-11-07 | End: 2024-11-07

## 2024-11-07 RX ORDER — FENTANYL CITRATE 50 UG/ML
50 INJECTION, SOLUTION INTRAMUSCULAR; INTRAVENOUS EVERY 5 MIN PRN
Status: DISCONTINUED | OUTPATIENT
Start: 2024-11-07 | End: 2024-11-07 | Stop reason: HOSPADM

## 2024-11-07 RX ORDER — LIDOCAINE HYDROCHLORIDE 10 MG/ML
0.1 INJECTION, SOLUTION EPIDURAL; INFILTRATION; INTRACAUDAL; PERINEURAL ONCE
Status: DISCONTINUED | OUTPATIENT
Start: 2024-11-07 | End: 2024-11-07 | Stop reason: HOSPADM

## 2024-11-07 RX ORDER — PREDNISOLONE ACETATE 10 MG/ML
1 SUSPENSION/ DROPS OPHTHALMIC 4 TIMES DAILY
Qty: 10 ML | Refills: 2 | Status: SHIPPED | OUTPATIENT
Start: 2024-11-07 | End: 2024-12-07

## 2024-11-07 RX ORDER — MIDAZOLAM HYDROCHLORIDE 1 MG/ML
INJECTION, SOLUTION INTRAMUSCULAR; INTRAVENOUS AS NEEDED
Status: DISCONTINUED | OUTPATIENT
Start: 2024-11-07 | End: 2024-11-07

## 2024-11-07 RX ORDER — LABETALOL HYDROCHLORIDE 5 MG/ML
5 INJECTION, SOLUTION INTRAVENOUS ONCE AS NEEDED
Status: DISCONTINUED | OUTPATIENT
Start: 2024-11-07 | End: 2024-11-07 | Stop reason: HOSPADM

## 2024-11-07 RX ORDER — FENTANYL CITRATE 50 UG/ML
25 INJECTION, SOLUTION INTRAMUSCULAR; INTRAVENOUS EVERY 5 MIN PRN
Status: DISCONTINUED | OUTPATIENT
Start: 2024-11-07 | End: 2024-11-07 | Stop reason: HOSPADM

## 2024-11-07 RX ORDER — ACETAMINOPHEN 325 MG/1
650 TABLET ORAL EVERY 4 HOURS PRN
Status: DISCONTINUED | OUTPATIENT
Start: 2024-11-07 | End: 2024-11-07 | Stop reason: HOSPADM

## 2024-11-07 RX ORDER — POVIDONE-IODINE 5 %
SOLUTION, NON-ORAL OPHTHALMIC (EYE) AS NEEDED
Status: DISCONTINUED | OUTPATIENT
Start: 2024-11-07 | End: 2024-11-07 | Stop reason: HOSPADM

## 2024-11-07 RX ORDER — WATER 1 ML/ML
IRRIGANT IRRIGATION AS NEEDED
Status: DISCONTINUED | OUTPATIENT
Start: 2024-11-07 | End: 2024-11-07 | Stop reason: HOSPADM

## 2024-11-07 RX ORDER — EPINEPHRINE 1 MG/ML
INJECTION, SOLUTION, CONCENTRATE INTRAVENOUS AS NEEDED
Status: DISCONTINUED | OUTPATIENT
Start: 2024-11-07 | End: 2024-11-07 | Stop reason: HOSPADM

## 2024-11-07 RX ORDER — OFLOXACIN 3 MG/ML
1 SOLUTION/ DROPS OPHTHALMIC 4 TIMES DAILY
Qty: 5 ML | Refills: 0 | Status: SHIPPED | OUTPATIENT
Start: 2024-11-07 | End: 2024-11-14

## 2024-11-07 RX ORDER — LIDOCAINE HYDROCHLORIDE 20 MG/ML
INJECTION, SOLUTION INFILTRATION; PERINEURAL AS NEEDED
Status: DISCONTINUED | OUTPATIENT
Start: 2024-11-07 | End: 2024-11-07 | Stop reason: HOSPADM

## 2024-11-07 SDOH — HEALTH STABILITY: MENTAL HEALTH: CURRENT SMOKER: 0

## 2024-11-07 ASSESSMENT — PAIN - FUNCTIONAL ASSESSMENT
PAIN_FUNCTIONAL_ASSESSMENT: 0-10

## 2024-11-07 ASSESSMENT — PAIN SCALES - GENERAL
PAINLEVEL_OUTOF10: 0 - NO PAIN

## 2024-11-07 ASSESSMENT — ENCOUNTER SYMPTOMS
RESPIRATORY NEGATIVE: 1
NEUROLOGICAL NEGATIVE: 1
CONSTITUTIONAL NEGATIVE: 1
GASTROINTESTINAL NEGATIVE: 1
CARDIOVASCULAR NEGATIVE: 1

## 2024-11-07 NOTE — H&P
"History Of Present Illness  Saskia Koehler is a 64 y.o. female status post (s/p) pars plana vitrectomy (PPV) with silicone oil and retinotectomy 09/13/24 right eye presenting with proliferative vitreoretinopathy right eye.     Past Medical History  Past Medical History:   Diagnosis Date    Corneal abrasion     Diverticulosis     Eye trauma     Injury 4/2024    Nephrolithiasis     Retinal detachment        Surgical History  Past Surgical History:   Procedure Laterality Date    BACK SURGERY  01/30/2014    Back Surgery    BACK SURGERY Bilateral     CATARACT EXTRACTION      COLONOSCOPY      EYE SURGERY Right 09/13/2024    vitrectomy        Social History  She reports that she has never smoked. She has never been exposed to tobacco smoke. She has never used smokeless tobacco. She reports that she does not currently use alcohol after a past usage of about 1.0 standard drink of alcohol per week. Drug use questions deferred to the physician.    Family History  Family History   Problem Relation Name Age of Onset    No Known Problems Mother          Allergies  Morphine, Other, and Hay fever and allergy relief    Review of Systems   Constitutional: Negative.    Respiratory: Negative.     Cardiovascular: Negative.    Gastrointestinal: Negative.    Neurological: Negative.         Physical Exam  Pulmonary:      Effort: Pulmonary effort is normal.   Abdominal:      Palpations: Abdomen is soft.   Skin:     General: Skin is warm.   Neurological:      Mental Status: She is alert. Mental status is at baseline.   Psychiatric:         Mood and Affect: Mood normal.         Thought Content: Thought content normal.          Last Recorded Vitals  Blood pressure 111/70, pulse 64, temperature 36.1 °C (97 °F), temperature source Temporal, resp. rate 16, height 1.626 m (5' 4\"), weight 72 kg (158 lb 11.7 oz), SpO2 97%.    Relevant Results        Current Outpatient Medications   Medication Instructions    acetaZOLAMIDE (DIAMOX) 500 mg, oral, 2 " times daily    brimonidine (AlphaGAN) 0.2 % ophthalmic solution 1 drop, Right Eye, 2 times daily    dorzolamide-timoloL (Cosopt) 22.3-6.8 mg/mL ophthalmic solution 1 drop, Right Eye, 2 times daily    Lumigan 0.01 % ophthalmic solution ADMINISTER 1 DROP INTO THE RIGHT EYE ONCE DAILY AT BEDTIME.    meloxicam (MOBIC) 7.5 mg, oral, Daily    prednisoLONE acetate (Pred-Forte) 1 % ophthalmic suspension instill 1 drop in the right eye 4 times a day          Assessment/Plan   Assessment & Plan  Retinal detachment, tractional, right    Proliferative vitreoretinopathy of right eye      Saskia Koehler is a 64 y.o. female status post (s/p) pars plana vitrectomy (PPV) with silicone oil and retinotectomy 09/13/24 right eye presenting with proliferative vitreoretinopathy right eye here for pars plana vitrectomy (PPV)/membrane peel/retinectomy, endolaser (EL), silicone oil exchange right eye            Maricarmen Sheldon MD

## 2024-11-07 NOTE — ANESTHESIA PREPROCEDURE EVALUATION
Patient: Saskia Koehler    Procedure Information       Date/Time: 11/07/24 0930    Procedure: Pars Plana Vitrectomy, Membrane Peel, Relaxing Retinectomy, Perfluorooctane placement (PFO), endolaser, gas versus silicone oil tamponade (Right) - with retrobulbar block - right eye    [place] [facility] [place] [facility] [place] [facility] [place] [facility] [place]    Surgeons: Fabrizio Bhakta MD          Vitals:    11/07/24 0845   BP: 111/70   Pulse: 64   Resp: 16   Temp: 36.1 °C (97 °F)   SpO2: 97%       Past Surgical History:   Procedure Laterality Date   • BACK SURGERY  01/30/2014    Back Surgery   • BACK SURGERY Bilateral    • CATARACT EXTRACTION     • COLONOSCOPY     • EYE SURGERY Right 09/13/2024    vitrectomy     Past Medical History:   Diagnosis Date   • Corneal abrasion    • Diverticulosis    • Eye trauma     Injury 4/2024   • Nephrolithiasis    • Retinal detachment      No current facility-administered medications for this encounter.  Prior to Admission medications    Medication Sig Start Date End Date Taking? Authorizing Provider   acetaZOLAMIDE (Diamox) 500 mg 12 hr capsule Take 1 capsule (500 mg) by mouth 2 times a day. 10/8/24 11/7/24 Yes Fabrizio Bhakta MD   brimonidine (AlphaGAN) 0.2 % ophthalmic solution Administer 1 drop into the right eye 2 times a day. 11/5/24 11/5/25 Yes Fabrizio Bhakta MD   dorzolamide-timoloL (Cosopt) 22.3-6.8 mg/mL ophthalmic solution Administer 1 drop into the right eye 2 times a day. 11/5/24  Yes Fabrizio Bhakta MD   Lumigan 0.01 % ophthalmic solution ADMINISTER 1 DROP INTO THE RIGHT EYE ONCE DAILY AT BEDTIME. 9/26/24  Yes Historical Provider, MD   meloxicam (Mobic) 7.5 mg tablet Take 1 tablet (7.5 mg) by mouth once daily. 8/21/24 8/21/25 Yes Brittany Behm, DO   prednisoLONE acetate (Pred-Forte) 1 % ophthalmic suspension instill 1 drop in the right eye 4 times a day 9/13/24  Yes Historical Provider, MD   brimonidine (AlphaGAN) 0.2 % ophthalmic solution Administer 1 drop into the right eye 2 times a day. 9/19/24  "11/5/24  Fabrizio Bhakta MD   dorzolamide-timoloL (Cosopt) 22.3-6.8 mg/mL ophthalmic solution  7/16/24 11/5/24  Historical Provider, MD     Allergies   Allergen Reactions   • Morphine Unknown   • Other Other     Eye gas causes increase pressure   • Hay Fever And Allergy Relief Agitation     Sneezing and congestion     Social History     Tobacco Use   • Smoking status: Never     Passive exposure: Never   • Smokeless tobacco: Never   • Tobacco comments:     Pt denies any illness in past 30 days     Denies any personal/family reactions or problems with anesthesia     Denies SOB with stairs or activity     Ambulates freely   Substance Use Topics   • Alcohol use: Not Currently     Alcohol/week: 1.0 standard drink of alcohol     Types: 1 Standard drinks or equivalent per week         Chemistry    Lab Results   Component Value Date/Time     09/06/2024 0734    K 4.7 09/06/2024 0734     09/06/2024 0734    CO2 32 09/06/2024 0734    BUN 15 09/06/2024 0734    CREATININE 0.78 09/06/2024 0734    Lab Results   Component Value Date/Time    CALCIUM 9.7 09/06/2024 0734    ALKPHOS 60 09/06/2024 0734    AST 16 09/06/2024 0734    ALT 15 09/06/2024 0734    BILITOT 0.9 09/06/2024 0734          Lab Results   Component Value Date/Time    WBC 5.1 09/06/2024 0734    HGB 13.5 09/06/2024 0734    HCT 41.2 09/06/2024 0734     09/06/2024 0734     No results found for: \"PROTIME\", \"PTT\", \"INR\"  Encounter Date: 10/02/24   ECG 12 lead (Clinic Performed)    Narrative    NSR     No results found for this or any previous visit from the past 1095 days.        Relevant Problems   No relevant active problems       Clinical information reviewed:   Tobacco  Allergies  Meds   Med Hx  Surg Hx  OB Status  Fam Hx  Soc   Hx        NPO Detail:  NPO/Void Status  Date of Last Liquid: 11/06/24  Time of Last Liquid: 1800  Date of Last Solid: 11/06/24  Time of Last Solid: 1800  Last Intake Type: Clear fluids; Light meal; Food         Physical " Exam    Airway  Mallampati: III  TM distance: >3 FB  Neck ROM: full     Cardiovascular   Rhythm: regular     Dental        Pulmonary   Breath sounds clear to auscultation     Abdominal        Anesthesia Plan    History of general anesthesia?: yes  History of complications of general anesthesia?: yes    ASA 2     general and MAC   (Hx of PONV, preop Emend, Scopolamine patch ordered. Initially discussed GA, however, plan for MAC due to surgeon request. )  The patient is not a current smoker.  Patient did not smoke on day of procedure.    intravenous induction   Anesthetic plan and risks discussed with patient.    Plan discussed with CRNA.

## 2024-11-07 NOTE — ANESTHESIA POSTPROCEDURE EVALUATION
Patient: Saskia Koehler    Procedure Summary       Date: 11/07/24 Room / Location: [facility] [place] [facility] 04 / [facility] [place] [facility]    Anesthesia Start: 1002 Anesthesia Stop: 1121    Procedure: Pars Plana Vitrectomy, Membrane Peel, Relaxing Retinectomy, Perfluorooctane placement (PFO), endolaser, silicone oil tamponade (Right) Diagnosis:       Retinal detachment, tractional, right      Proliferative vitreoretinopathy of right eye      (Retinal detachment, tractional, right [H33.41])      (Proliferative vitreoretinopathy of right eye [H35.21])    Surgeons: Fabrizio Bhakta MD Responsible Provider: Joellen Mejias MD    Anesthesia Type: general ASA Status: 2            Anesthesia Type: general    Vitals Value Taken Time   BP 98/57 11/07/24 1139   Temp 36.1 °C (97 °F) 11/07/24 1139   Pulse 59 11/07/24 1139   Resp 16 11/07/24 1139   SpO2 97 % 11/07/24 1139       Anesthesia Post Evaluation    Patient participation: complete - patient participated  Level of consciousness: awake and alert  Pain management: adequate  Airway patency: patent  Cardiovascular status: acceptable  Respiratory status: acceptable  Hydration status: acceptable  Postoperative Nausea and Vomiting: none    No notable events documented.

## 2024-11-07 NOTE — BRIEF OP NOTE
Date: 2024  OR Location: [facility] OR    Name: Saskia Koehler, : 1960, Age: 64 y.o., MRN: [id number], Sex: female    Diagnosis  Pre-op Diagnosis      * Retinal detachment, tractional, right [H33.41]     * Proliferative vitreoretinopathy of right eye [H35.21] Post-op Diagnosis     * Retinal detachment, tractional, right [H33.41]     * Proliferative vitreoretinopathy of right eye [H35.21]     Procedures  IN RPR COMPLEX RETINA DETACH VITRECT &MEMBRANE PEEL [[id number]] via:   25 Gauge Pars Plana Vitrectomy, Membrane Peel, Relaxing Retinectomy,  endodrainage, air-fluid exchange, endolaser, silicone oil tamponade - RIGHT EYE    Surgeons      * Fabrizio Bhakta - Primary    Resident/Fellow/Other Assistant:  Surgeons and Role:     * Shukri Corral MD - Assisting     * Pb Winters MD - Resident - Observing    Staff:   Circulator: Alexus Leachub Person: Shruti  Circulator: Alisa Cat Person: Amira    Anesthesia Staff: Anesthesiologist: Joellen Mejias MD  CRNA: DANIELLE Yoon-CRNA    Procedure Summary  Anesthesia: General  ASA: II  Estimated Blood Loss: 0 mL  Intra-op Medications:   Administrations occurring from 0930 to 1030 on 24:   Medication Name Total Dose   balanced salts (BSS) intraocular solution 15 mL   balanced salts (BSS Plus) intraocular solution 500 mL   EPINEPHrine HCl (PF) (Adrenalin) injection 0.3 mg   lidocaine (Xylocaine) 20 mg/mL (2 %) injection 4 mL   bupivacaine PF (Marcaine) injection 0.75 % 4 mL   povidone-iodine 5 % ophthalmic solution 1 Application   sterile water irrigation solution 250 mL   chondroitin sulf-sod hyaluron (Duovisc) intraocular kit 1 mL   fentaNYL PF 0.05 mg/mL 100 mcg   midazolam (Versed) 1 mg/1 mL 2 mg   propofol (Diprivan) injection 10 mg/mL 30 mg   sodium chloride 0.9 % flush 8 mL          Anesthesia Record               Intraprocedure I/O Totals          Output    Est. Blood Loss 0 mL    Total Output 0 mL          Specimen: No specimens  collected     Findings: proliferative vitreoretinopathy, tractional (macula off) retinal detachment - RIGHT EYE    Complications:  None; patient tolerated the procedure well.     Disposition: PACU - hemodynamically stable.  Condition: stable  Specimens Collected: No specimens collected    Attending Attestation:     A qualified resident physician was not available and the use of a skilled assistant surgeon, Shukri Corral MD, was required for the following portions of this case:  25 Gauge Pars Plana Vitrectomy, Membrane Peel, Relaxing Retinectomy,  endodrainage, air-fluid exchange, endolaser, silicone oil tamponade - RIGHT EYE      Fabrizio Bhakta  Phone Number: [phone]

## 2024-11-07 NOTE — OP NOTE
Pars Plana Vitrectomy, Membrane Peel, Relaxing Retinectomy, , endolaser, silicone oil tamponade (R) Operative Note     Date: 2024  OR Location: [facility] [place] [facility]    Name: Saskia Koehler, : 1960, Age: 64 y.o., MRN: [id number], Sex: female    Diagnosis  Pre-op Diagnosis      * Retinal detachment, tractional, right [H33.41]     * Proliferative vitreoretinopathy of right eye [H35.21] Post-op Diagnosis     * Retinal detachment, tractional, right [H33.41]     * Proliferative vitreoretinopathy of right eye [H35.21]     Procedures  AR RPR COMPLEX RETINA DETACH VITRECT &MEMBRANE PEEL [[id number]] via:   25 Gauge Pars Plana Vitrectomy, Membrane Peel, Relaxing Retinectomy,  endodrainage, air-fluid exchange, endolaser, silicone oil tamponade - RIGHT EYE    Surgeons      * Fabrizio Bhakta - Primary    Resident/Fellow/Other Assistant:  Surgeons and Role:     * Shukri Corral MD - Assisting     * Pb Winters MD - Resident - Observing    Staff:   Riccardoulator: Alexus  Scrub Person: Shruti  Circulator: Alisa Leachub Person: Amira    Anesthesia Staff: Anesthesiologist: Joellen Mejias MD  CRNA: DANIELLE Yoon-CRNA    Procedure Summary  Anesthesia: General  ASA: II  Estimated Blood Loss: 0 mL    Intra-op Medications:   Administrations occurring from 0930 to 1030 on 24:   Medication Name Total Dose   balanced salts (BSS) intraocular solution 15 mL   balanced salts (BSS Plus) intraocular solution 500 mL   EPINEPHrine HCl (PF) (Adrenalin) injection 0.3 mg   lidocaine (Xylocaine) 20 mg/mL (2 %) injection 4 mL   bupivacaine PF (Marcaine) injection 0.75 % 4 mL   povidone-iodine 5 % ophthalmic solution 1 Application   sterile water irrigation solution 250 mL   chondroitin sulf-sod hyaluron (Duovisc) intraocular kit 1 mL   fentaNYL PF 0.05 mg/mL 100 mcg   midazolam (Versed) 1 mg/1 mL 2 mg   propofol (Diprivan) injection 10 mg/mL 30 mg   sodium chloride 0.9 % flush 8 mL          Anesthesia Record                Intraprocedure I/O Totals          Output    Est. Blood Loss 0 mL    Total Output 0 mL          Specimen: No specimens collected     Drains and/or Catheters: * None in log *    Tourniquet Times: n/a        Implants:  Implants       Type Name Action Serial No.      Implant MED,SILICONE OIL 1000 OPHTHALMIC - [id number] Implanted               Findings: proliferative vitreoretinopathy, tractional (macula-off) retinal detachment - RIGHT EYE    Indications: Saskia Koehler is an 64 y.o. female who is having surgery for Retinal detachment, tractional, right [H33.41]  Proliferative vitreoretinopathy of right eye [H35.21].     The patient was seen in the preoperative area. The risks, benefits, complications, treatment options, non-operative alternatives, expected recovery and outcomes were discussed with the patient. The possibilities of reaction to medication, pulmonary aspiration, injury to surrounding structures, bleeding, recurrent infection, the need for additional procedures, failure to diagnose a condition, and creating a complication requiring transfusion or operation were discussed with the patient. The patient concurred with the proposed plan, giving informed consent.  The site of surgery was properly noted/marked if necessary per policy. The patient has been actively warmed in preoperative area. Preoperative antibiotics are not indicated. Venous thrombosis prophylaxis are not indicated.    Procedure Details:     DATE OF SURGERY: 11/7/2024    SURGEON: Fabrizio Bhakta MD    ASSISTANT: Shukri Corral MD     PREOPERATIVE DIAGNOSIS  Pre-Op Diagnosis Codes:      * Retinal detachment, tractional, right [H33.41]     * Proliferative vitreoretinopathy of right eye [H35.21]    POSTOPERATIVE DIAGNOSIS   Post-op Diagnosis     * Retinal detachment, tractional, right [H33.41]     * Proliferative vitreoretinopathy of right eye [H35.21]    OPERATION PERFORMED    25 Gauge Pars Plana Vitrectomy, Membrane Peel, Relaxing Retinectomy,   endodrainage, air-fluid exchange, endolaser, silicone oil tamponade exchange - RIGHT EYE    ANESTHESIA  Monitored anesthesia care with a standard block consisting of a 1:1 mixture of 4 %  lidocaine and 0.75% Marcaine with Wydase     FINDINGS  As detailed below     COMPLICATIONS  None     ESTIMATED BLOOD LOSS   Minimal     SPECIMENS REMOVED  None     JUSTIFICATION  This is a 64 y.o. year old patient with proliferative vitreoretinopathy, tractional (macula-off) retinal detachment - RIGHT EYE. This was causing decreased visual function. The risks, benefits, and alternatives to the procedure were discussed with the patient including the risk for vision loss, blindness, retinal detachment, infection, bleeding, pain, high or low pressure in the eye, double vision, no benefit, need for additional procedures, and droopy eyelid, amongst others. The patient had a full opportunity to have all questions answered in clinic prior to surgery. Afterwards, the patient requested that we perform surgery and signed the consent form.     PROCEDURE:   The patient was brought to the preoperative holding area where the correct eye was confirmed and marked. The patient was then brought to the operating room where a secondary time-out was performed to identify the correct patient, eye, procedure, and any allergies. The patient then underwent intravenous sedation by the anesthesia team followed by a block as described above. The eye was prepped and draped in the usual sterile ophthalmic fashion followed by placement of a lid speculum.     A 25-gauge trocar was placed in the inferotemporal quadrant 3.5 mm posterior to the limbus after conjunctival displacement.  A 4 mm infusion cannula was placed through this trocar, and the infusion cannula was confirmed in the vitreous cavity prior to turning it on. Two additional 25-gauge trocars were placed in a similar fashion in the superonasal and superotemporal quadrants 3.5 mm posterior to the limbus  after conjunctival displacement.     At this time, a standard three-port pars plana vitrectomy was performed using the light pipe, the cutter, and the BIOM viewing system.  The retina was inspected and was found to be detached with preretinal vitreoretinopathy causing traction along the distal edge of the inferior retinectomy. The superonasal aspect of the retinectomy also appeared elevated. Under silicone oil, diathermy was used to cauterize the retina in order to expand the retinectomy. The inferior retinectomy edge at 6 o'clock was marked with diathermy was was carried to 9 o'clock. The superonasal aspect of the prior retinectomy site was then marked with diathermy at 2 o'clock and diathermy was carried to 12 o'clock. . Silicone oil was then removed from the vitreous cavity using the viscous fluid extraction. Residual silicone oil bubbles were removed with aspiration.  The patient had multiple prior vitrectomies, therefore additional vitreous dissection was not required. A partial air-fluid exchange was performed and the vitrector was used to incise the retina and extend from the inferior aspect of the retinectomy from 6 to 9 o'clock; in similar fashion the vitrector was used to extend the superonasal aspect of the prior retinectomy from 2 oclock to 12 o'clock superiorly. After performing the retinectomy, a complete air-fluid exchange was performed and the retina appeared to lie flat with no evidence of residual peripheral traction. The soft tip cannula was used to aspirate any residual fluid from the distal edge of the retinectomy. The retina was nicely re approximated without any evidence of retinal folds. The endolaser probe was brought into the field and 2-3 rows of barrier laser were applied posterior to the retinectomy and care was taken to laser the anterior horns of the retinectomy ( at 12 and 9 oclock) up to the ora andrés. Laser Settings: Power - 250mW, Duration: 100ms, Interval: 200ms, Shot  Count:1013, Total Energy: 25.38 Joules.    The vitreous cavity was filled with silicone oil through the superotemporal trocar and vented through the superonasal trocar. The superonasal trocar was removed and the sclerotomy was sutured with an interrupted 7-0 Vicryl.  The superotemporal trocar was removed and the sclerotomy was sutured with an interrupted 7-0 Vicryl. The infusion cannula and associated trocar were removed and the sclerotomy was sutured with an interrupted 7-0 Vicryl. The eye was confirmed to be at a physiologic level by digital palpation.    The lid speculum and drapes were removed. Antibiotic ointment was applied. The eye was patched and shielded. The patient tolerated the procedure well without any intraoperative or immediate postoperative complications. The patient was taken to the recovery room in good condition. The patient was instructed to maintain a strict face-down position. The patient will follow up with Shukri Corral MD in clinic on the following morning.       Complications:  None; patient tolerated the procedure well.    Disposition: PACU - hemodynamically stable.  Condition: stable     Additional Details:     A qualified resident physician was not available and the use of a skilled assistant surgeon, Shukri Corral MD, was required for the following portions of this case: 25 Gauge Pars Plana Vitrectomy, Membrane Peel, Relaxing Retinectomy,  endodrainage, air-fluid exchange, endolaser, silicone oil tamponade exchange - RIGHT EYE      Attending Attestation:     Fabrizio Bhakta  Phone Number: [phone]

## 2024-11-07 NOTE — DISCHARGE INSTRUCTIONS
Please keep the eye patched/shielded until your post op day 1 appointment tomorrow.    Appointment: [facility], [address] (3rd floor), You will be seen by Shukri Corral MD - Please report to clinic at 9AM as scheduled     Please do not perform any strenuous activity, bending below the waist, until you are cleared by your doctor.     Post Operative Medications: You will start your eye drops tomorrow after your post operative day 1 appointment   Antibiotic (Tan Cap) - 4 times per day              Steroid (Pink Cap) - 4 times per day   The order of drop instillation does not matter but you must wait atleast 5 minutes in between drops to ensure that the medications absorb properly    Positioning: Please maintain strict face down positioning during the day for the next 24 hours. You may take one 10-15 break per hour to sit upright/stand to eat and use the bathroom. When sitting or standing upright, look down at the floor to maintain facedown positioning.  At night, lay on your left side.

## 2024-11-08 ENCOUNTER — APPOINTMENT (OUTPATIENT)
Dept: OPHTHALMOLOGY | Facility: CLINIC | Age: 64
End: 2024-11-08
Payer: MEDICARE

## 2024-11-08 DIAGNOSIS — H35.21 PROLIFERATIVE VITREORETINOPATHY OF RIGHT EYE: ICD-10-CM

## 2024-11-08 DIAGNOSIS — H33.21 RIGHT RETINAL DETACHMENT: Primary | ICD-10-CM

## 2024-11-08 PROCEDURE — 99024 POSTOP FOLLOW-UP VISIT: CPT | Performed by: OPHTHALMOLOGY

## 2024-11-08 PROCEDURE — 92134 CPTRZ OPH DX IMG PST SGM RTA: CPT | Mod: RIGHT SIDE | Performed by: OPHTHALMOLOGY

## 2024-11-08 ASSESSMENT — TONOMETRY
IOP_METHOD: GOLDMANN APPLANATION
OD_IOP_MMHG: 24

## 2024-11-08 ASSESSMENT — CONF VISUAL FIELD
OD_SUPERIOR_TEMPORAL_RESTRICTION: 3
OD_SUPERIOR_NASAL_RESTRICTION: 3

## 2024-11-08 ASSESSMENT — VISUAL ACUITY
METHOD: SNELLEN - LINEAR
OD_SC: CF

## 2024-11-08 ASSESSMENT — SLIT LAMP EXAM - LIDS: COMMENTS: NORMAL

## 2024-11-08 ASSESSMENT — CUP TO DISC RATIO: OD_RATIO: 0.3

## 2024-11-08 ASSESSMENT — EXTERNAL EXAM - RIGHT EYE: OD_EXAM: NORMAL

## 2024-11-08 NOTE — PROGRESS NOTES
Assessment/Plan   Diagnoses and all orders for this visit:  Proliferative vitreoretinopathy of right eye  Right retinal detachment      OCT : 11/08/24     OD: Abnormal Foveal Contour, trace CME, Interval improvement of inferior para & perifoveal subretinal fluid - macula now fully attached    Here today POD#1 s/p PPV/MP/retinectomy/EL/SO exchange OD (11/7/24)  Today Vision CF, IOP 24  Retina appears attached, good silicone oil fill   - trace heme at inferior retinectomy margin - will monitor closely    Plan:   Start Prednisolone/ Ofloxacin QID OD  Restart brimonidine  & cosopt BID right eye   - Hold diamox for now    # Prior Course:     - hx traumatic subluxated crystalline lens s/p PPV/lensectomy/ SFIOL (Prasad ) 05/24/24  - Subsequent Mac-On RD s/p PPV/EL/Gas (7/17/24)  - Subsequent PVR RD s/p PPV/MP/Retinectomy/EL/SO (9/23/24)

## 2024-11-15 ENCOUNTER — APPOINTMENT (OUTPATIENT)
Dept: OPHTHALMOLOGY | Facility: CLINIC | Age: 64
End: 2024-11-15
Payer: MEDICARE

## 2024-11-15 DIAGNOSIS — H40.051 ELEVATED IOP, RIGHT: ICD-10-CM

## 2024-11-15 DIAGNOSIS — H33.001 MACULA-ON RHEGMATOGENOUS RETINAL DETACHMENT OF RIGHT EYE: ICD-10-CM

## 2024-11-15 DIAGNOSIS — H43.01 VITREOUS PROLAPSE OF RIGHT EYE: Primary | ICD-10-CM

## 2024-11-15 PROCEDURE — 92250 FUNDUS PHOTOGRAPHY W/I&R: CPT | Mod: RIGHT SIDE | Performed by: OPHTHALMOLOGY

## 2024-11-15 PROCEDURE — 99024 POSTOP FOLLOW-UP VISIT: CPT | Performed by: OPHTHALMOLOGY

## 2024-11-15 RX ORDER — ACETAZOLAMIDE 500 MG/1
500 CAPSULE, EXTENDED RELEASE ORAL 2 TIMES DAILY
Qty: 60 CAPSULE | Refills: 0 | Status: SHIPPED | OUTPATIENT
Start: 2024-11-15 | End: 2024-12-15

## 2024-11-15 RX ORDER — DORZOLAMIDE HYDROCHLORIDE AND TIMOLOL MALEATE 20; 5 MG/ML; MG/ML
1 SOLUTION/ DROPS OPHTHALMIC 2 TIMES DAILY
Qty: 5 ML | Refills: 3 | Status: SHIPPED | OUTPATIENT
Start: 2024-11-15

## 2024-11-15 ASSESSMENT — EXTERNAL EXAM - RIGHT EYE: OD_EXAM: NORMAL

## 2024-11-15 ASSESSMENT — SLIT LAMP EXAM - LIDS: COMMENTS: NORMAL

## 2024-11-15 ASSESSMENT — CUP TO DISC RATIO: OD_RATIO: 0.3

## 2024-11-15 NOTE — PROGRESS NOTES
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  Elevated IOP, right  -     dorzolamide-timoloL (Cosopt) 22.3-6.8 mg/mL ophthalmic solution; Administer 1 drop into the right eye 2 times a day.  Today intraocular pressure (IOP) 18 right    Add Diamox just for precustions    OCT : 11/15/24     OD: Abnormal Foveal Contour, trace CME, Interval improvement of inferior para & perifoveal subretinal fluid - macula now fully attached    Here today POD#1 s/p PPV/MP/retinectomy/EL/SO exchange OD (11/7/24)  Today Vision CF, IOP 24  Retina appears attached, good silicone oil fill   - trace heme at inferior retinectomy margin - will monitor closely    Plan:   Start Prednisolone/ Ofloxacin QID OD  Restart brimonidine  & cosopt BID right eye   - Hold diamox for now    # Prior Course:     - hx traumatic subluxated crystalline lens s/p PPV/lensectomy/ SFIOL (Yamane ) 05/24/24  - Subsequent Mac-On RD s/p PPV/EL/Gas (7/17/24)  - Subsequent PVR RD s/p PPV/MP/Retinectomy/EL/SO (9/23/24)      Refilled diamox 500 bid and cosopt  3 weeks

## 2024-12-09 ENCOUNTER — APPOINTMENT (OUTPATIENT)
Dept: OPHTHALMOLOGY | Age: 64
End: 2024-12-09
Payer: MEDICARE

## 2024-12-09 DIAGNOSIS — H33.001 MACULA-ON RHEGMATOGENOUS RETINAL DETACHMENT OF RIGHT EYE: Primary | ICD-10-CM

## 2024-12-09 PROCEDURE — 99024 POSTOP FOLLOW-UP VISIT: CPT | Performed by: OPHTHALMOLOGY

## 2024-12-09 PROCEDURE — 92134 CPTRZ OPH DX IMG PST SGM RTA: CPT | Performed by: OPHTHALMOLOGY

## 2024-12-09 ASSESSMENT — VISUAL ACUITY
OD_SC: CF@2'
METHOD: SNELLEN - LINEAR
OS_SC: 20/20

## 2024-12-09 ASSESSMENT — CONF VISUAL FIELD
OD_SUPERIOR_TEMPORAL_RESTRICTION: 3
OS_INFERIOR_NASAL_RESTRICTION: 0
OD_INFERIOR_TEMPORAL_RESTRICTION: 0
OD_SUPERIOR_NASAL_RESTRICTION: 3
OS_SUPERIOR_NASAL_RESTRICTION: 0
OS_SUPERIOR_TEMPORAL_RESTRICTION: 0
OD_INFERIOR_NASAL_RESTRICTION: 0
OS_INFERIOR_TEMPORAL_RESTRICTION: 0

## 2024-12-09 ASSESSMENT — CUP TO DISC RATIO: OD_RATIO: 0.3

## 2024-12-09 ASSESSMENT — SLIT LAMP EXAM - LIDS: COMMENTS: NORMAL

## 2024-12-09 ASSESSMENT — TONOMETRY
IOP_METHOD: GOLDMANN APPLANATION
OD_IOP_MMHG: 11

## 2024-12-09 ASSESSMENT — ENCOUNTER SYMPTOMS: EYES NEGATIVE: 1

## 2024-12-09 ASSESSMENT — EXTERNAL EXAM - RIGHT EYE: OD_EXAM: NORMAL

## 2024-12-09 NOTE — PROGRESS NOTES
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  -     OCT, Retina - OU - Both Eyes  -     Color Fundus Photography - OU - Both Eyes  Today intraocular pressure (IOP) 1          OCT : 12/09/24     OD: Abnormal Foveal Contour, trace CME, Interval improvement of inferior para & perifoveal subretinal fluid - macula now fully attached    Here today POD#1 s/p PPV/MP/retinectomy/EL/SO exchange OD (11/7/24)  Today Vision CF, IOP 24  Retina appears attached, good silicone oil fill   - trace heme at inferior retinectomy margin - will monitor closely    Plan:   Taper Prednisolone QID OD weekly by one drop per week  Stop Ofloxacin  Continue brimonidine & cosopt BID right eye, latanoprost at bedtime OD  Stop diamox    # Prior Course:     - hx traumatic subluxated crystalline lens s/p PPV/lensectomy/ SFIOL (Prasad ) 05/24/24  - Subsequent Mac-On RD s/p PPV/EL/Gas (7/17/24)  - Subsequent PVR RD s/p PPV/MP/Retinectomy/EL/SO (9/23/24)    FU for intraocular pressure (IOP) check only in 1-2 weeks

## 2024-12-19 ENCOUNTER — APPOINTMENT (OUTPATIENT)
Dept: OPHTHALMOLOGY | Facility: CLINIC | Age: 64
End: 2024-12-19
Payer: MEDICARE

## 2024-12-19 DIAGNOSIS — H26.131 TOTAL TRAUMATIC CATARACT, RIGHT EYE: ICD-10-CM

## 2024-12-19 DIAGNOSIS — H33.001 MACULA-ON RHEGMATOGENOUS RETINAL DETACHMENT OF RIGHT EYE: Primary | ICD-10-CM

## 2024-12-19 DIAGNOSIS — H43.01 VITREOUS PROLAPSE OF RIGHT EYE: ICD-10-CM

## 2024-12-19 DIAGNOSIS — H35.21 PROLIFERATIVE VITREORETINOPATHY OF RIGHT EYE: ICD-10-CM

## 2024-12-19 PROCEDURE — 92250 FUNDUS PHOTOGRAPHY W/I&R: CPT | Performed by: OPHTHALMOLOGY

## 2024-12-19 PROCEDURE — 99024 POSTOP FOLLOW-UP VISIT: CPT | Performed by: OPHTHALMOLOGY

## 2024-12-19 ASSESSMENT — VISUAL ACUITY
METHOD: SNELLEN - LINEAR
OS_CC: 20/20
OD_CC: CF@2'

## 2024-12-19 ASSESSMENT — CUP TO DISC RATIO: OD_RATIO: 0.3

## 2024-12-19 ASSESSMENT — EXTERNAL EXAM - RIGHT EYE: OD_EXAM: NORMAL

## 2024-12-19 ASSESSMENT — TONOMETRY
OD_IOP_MMHG: 11
IOP_METHOD: GOLDMANN APPLANATION

## 2024-12-19 ASSESSMENT — ENCOUNTER SYMPTOMS: EYES NEGATIVE: 1

## 2024-12-19 ASSESSMENT — SLIT LAMP EXAM - LIDS: COMMENTS: NORMAL

## 2024-12-19 NOTE — PROGRESS NOTES
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  -     Color Fundus Photography - OU - Both Eyes  Proliferative vitreoretinopathy of right eye  Total traumatic cataract, right eye  Vitreous prolapse of right eye  Today intraocular pressure (IOP) 1          OCT : 12/19/24     OD: Abnormal Foveal Contour, trace CME, Interval improvement of inferior para & perifoveal subretinal fluid - macula now fully attached    Here today POM 1.5  s/p PPV/MP/retinectomy/EL/SO exchange OD (11/7/24)  Today Vision CF, IOP 24  Retina appears attached, good silicone oil fill   - trace heme at inferior retinectomy margin - will monitor closely    Plan:   Taper Prednisolone QID OD weekly by one drop per week  Stop Ofloxacin  Continue brimonidine & cosopt BID right eye, latanoprost at bedtime OD  Stop diamox    # Prior Course:     - hx traumatic subluxated crystalline lens s/p PPV/lensectomy/ SFIOL (Yamdarby ) 05/24/24  - Subsequent Mac-On RD s/p PPV/EL/Gas (7/17/24)  - Subsequent PVR RD s/p PPV/MP/Retinectomy/EL/SO (9/23/24)    FU for intraocular pressure (IOP) check only in 1-2 weeks    Cosopt bID  Lumigan HS   Stop PF and alphagan simultaneously  4m

## 2025-01-31 ENCOUNTER — PATIENT MESSAGE (OUTPATIENT)
Dept: PRIMARY CARE | Facility: CLINIC | Age: 65
End: 2025-01-31
Payer: MEDICARE

## 2025-01-31 DIAGNOSIS — M25.551 RIGHT HIP PAIN: ICD-10-CM

## 2025-02-03 RX ORDER — MELOXICAM 7.5 MG/1
7.5 TABLET ORAL DAILY
Qty: 90 TABLET | Refills: 3 | Status: SHIPPED | OUTPATIENT
Start: 2025-02-03 | End: 2026-02-03

## 2025-03-25 DIAGNOSIS — H40.051 ELEVATED IOP, RIGHT: Primary | ICD-10-CM

## 2025-03-25 RX ORDER — BIMATOPROST 0.1 MG/ML
1 SOLUTION/ DROPS OPHTHALMIC NIGHTLY
Qty: 15 ML | Refills: 3 | Status: SHIPPED | OUTPATIENT
Start: 2025-03-25 | End: 2025-03-26 | Stop reason: SDUPTHER

## 2025-03-26 DIAGNOSIS — H40.051 ELEVATED IOP, RIGHT: ICD-10-CM

## 2025-03-26 RX ORDER — BIMATOPROST 0.1 MG/ML
1 SOLUTION/ DROPS OPHTHALMIC NIGHTLY
Qty: 15 ML | Refills: 3 | Status: SHIPPED | OUTPATIENT
Start: 2025-03-26 | End: 2025-03-31

## 2025-03-31 DIAGNOSIS — H40.051 ELEVATED IOP, RIGHT: ICD-10-CM

## 2025-03-31 RX ORDER — BIMATOPROST 0.1 MG/ML
1 SOLUTION/ DROPS OPHTHALMIC NIGHTLY
Qty: 15 ML | Refills: 3 | Status: SHIPPED | OUTPATIENT
Start: 2025-03-31 | End: 2026-03-31

## 2025-04-17 ENCOUNTER — APPOINTMENT (OUTPATIENT)
Dept: OPHTHALMOLOGY | Facility: CLINIC | Age: 65
End: 2025-04-17
Payer: MEDICARE

## 2025-04-17 DIAGNOSIS — H35.21 PROLIFERATIVE VITREORETINOPATHY OF RIGHT EYE: Primary | ICD-10-CM

## 2025-04-17 DIAGNOSIS — H18.421 BAND KERATOPATHY OF RIGHT EYE: ICD-10-CM

## 2025-04-17 DIAGNOSIS — H35.372 EPIRETINAL MEMBRANE (ERM) OF LEFT EYE: ICD-10-CM

## 2025-04-17 DIAGNOSIS — H33.001 MACULA-ON RHEGMATOGENOUS RETINAL DETACHMENT OF RIGHT EYE: ICD-10-CM

## 2025-04-17 PROCEDURE — 99214 OFFICE O/P EST MOD 30 MIN: CPT | Performed by: OPHTHALMOLOGY

## 2025-04-17 PROCEDURE — 92250 FUNDUS PHOTOGRAPHY W/I&R: CPT | Performed by: OPHTHALMOLOGY

## 2025-04-17 ASSESSMENT — CUP TO DISC RATIO: OD_RATIO: 0.3

## 2025-04-17 ASSESSMENT — VISUAL ACUITY
CORRECTION_TYPE: GLASSES
METHOD: SNELLEN - LINEAR
OS_CC: 20/70+2
OD_SC: CF 2 FEET
OS_PH_CC: 20/30

## 2025-04-17 ASSESSMENT — TONOMETRY
OS_IOP_MMHG: 16
IOP_METHOD: GOLDMANN APPLANATION
OD_IOP_MMHG: 16

## 2025-04-17 ASSESSMENT — SLIT LAMP EXAM - LIDS: COMMENTS: NORMAL

## 2025-04-17 ASSESSMENT — EXTERNAL EXAM - RIGHT EYE: OD_EXAM: NORMAL

## 2025-04-17 NOTE — PROGRESS NOTES
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 macula fully attached  OS: normal foveal contour, no cystoid macular edema (CME). EZ intact       s/p PPV/MP/retinectomy/EL/SO exchange OD (11/7/24)  Today Vision CF, IOP 16  Retina appears attached, good silicone oil fill   - trace heme at inferior retinectomy margin - will monitor closely  Consider silicone oil removal    Scleral fixated intraocular lens (IOL)  -c/f early tilting, consider combined intraocular lens (IOL) refixation vs exchange + silicone oil removal case     Band keratopathy  -suspect due to SO    Plan:   -c/w lumigan OD     # Prior Course:   - hx traumatic subluxated crystalline lens s/p PPV/lensectomy/ SFIOL (Yamane ) 05/24/24  - Subsequent Mac-On RD s/p PPV/EL/Gas (7/17/24)  - Subsequent PVR RD s/p PPV/MP/Retinectomy/EL/SO (9/23/24)    Orx:  Lumigan HS OD     FU Dr. Hidalgo for consideration of combined intraocular lens (IOL) refixation vs exchange and silicone oil removal case

## 2025-04-21 ENCOUNTER — APPOINTMENT (OUTPATIENT)
Dept: OPHTHALMOLOGY | Facility: CLINIC | Age: 65
End: 2025-04-21
Payer: MEDICARE

## 2025-04-21 ENCOUNTER — PREP FOR PROCEDURE (OUTPATIENT)
Dept: OPHTHALMOLOGY | Facility: HOSPITAL | Age: 65
End: 2025-04-21

## 2025-04-21 DIAGNOSIS — H33.001 MACULA-ON RHEGMATOGENOUS RETINAL DETACHMENT OF RIGHT EYE: Primary | ICD-10-CM

## 2025-04-21 DIAGNOSIS — Z98.890 SILICONE OIL IN EYE AFTER VITRECTOMY: ICD-10-CM

## 2025-04-21 DIAGNOSIS — H27.131 POSTERIOR DISLOCATION OF RIGHT LENS: Primary | ICD-10-CM

## 2025-04-21 PROCEDURE — 92134 CPTRZ OPH DX IMG PST SGM RTA: CPT

## 2025-04-21 PROCEDURE — 99214 OFFICE O/P EST MOD 30 MIN: CPT

## 2025-04-21 RX ORDER — PROPARACAINE HYDROCHLORIDE 5 MG/ML
1 SOLUTION/ DROPS OPHTHALMIC ONCE
OUTPATIENT
Start: 2025-04-21 | End: 2025-04-21

## 2025-04-21 RX ORDER — TROPICAMIDE 10 MG/ML
1 SOLUTION/ DROPS OPHTHALMIC
OUTPATIENT
Start: 2025-04-21 | End: 2025-04-21

## 2025-04-21 RX ORDER — PHENYLEPHRINE HYDROCHLORIDE 25 MG/ML
1 SOLUTION/ DROPS OPHTHALMIC
OUTPATIENT
Start: 2025-04-21 | End: 2025-04-21

## 2025-04-21 ASSESSMENT — VISUAL ACUITY
OS_PH_CC: 20/40
METHOD: SNELLEN - LINEAR
OS_CC: 20/60
OD_SC: CF AT FACE
CORRECTION_TYPE: GLASSES

## 2025-04-21 ASSESSMENT — CUP TO DISC RATIO: OD_RATIO: 0.3

## 2025-04-21 ASSESSMENT — TONOMETRY
OS_IOP_MMHG: 11
OD_IOP_MMHG: 12
IOP_METHOD: GOLDMANN APPLANATION

## 2025-04-21 ASSESSMENT — EXTERNAL EXAM - RIGHT EYE: OD_EXAM: NORMAL

## 2025-04-21 ASSESSMENT — ENCOUNTER SYMPTOMS: EYES NEGATIVE: 1

## 2025-04-21 ASSESSMENT — SLIT LAMP EXAM - LIDS: COMMENTS: NORMAL

## 2025-04-21 NOTE — PROGRESS NOTES
Macula-off rhegmatogenous retinal detachment of right eyeH33.001    OCT : 04/21/25   OD: Abnormal Foveal Contour, stable cystoid macular edema (CME). macula fully attached  OS: normal foveal contour, no cystoid macular edema (CME). EZ intact       s/p PPV/MP/retinectomy/EL/SO exchange OD (11/7/24)  Today Vision CF, IOP 16  Retina appears attached, good silicone oil fill  trace heme at inferior retinectomy margin - will monitor closely    Impression/Plan  Retina is flat under SO; retinectomy edges seems stable despite traction from anterior retina. SFIOL (Yamane) is tilted   Discussed risks, benefits and alternatives of treatment options. Discussed guarded prognosis from repeated retinal detachment and risk of redatchment after SOR  Patients wishes to proceed with surgery   Schedule for silicone oil removal with IOL exchange    Scleral fixated intraocular lens (IOL)  -c/f early tilting, consider combined intraocular lens (IOL) refixation vs exchange + silicone oil removal case   IOL power 19D    Band keratopathy  -suspect due to SO - Will schedule for SOR  - Consult with cornea    Plan:   -c/w lumigan OD     # Prior Course:   - hx traumatic subluxated crystalline lens s/p PPV/lensectomy/ SFIOL (Yamane ) 05/24/24  - Subsequent Mac-On RD s/p PPV/EL/Gas (7/17/24)  - Subsequent PVR RD s/p PPV/MP/Retinectomy/EL/SO (9/23/24)    Orx:  Lumigan HS OD     FU Dr. Hidalgo for consideration of combined intraocular lens (IOL) refixation vs exchange and silicone oil removal case

## 2025-04-21 NOTE — H&P
History Of Present Illness  Saskia Koehler is a 64 y.o. female presenting with RD IOL subluxation.     Past Medical History  She has a past medical history of Corneal abrasion, Diverticulosis, Eye trauma, Glaucoma, Nephrolithiasis, Retinal detachment, Rhegmatogenous retinal detachment of right eye, and Vitreous prolapse of right eye.    Surgical History  She has a past surgical history that includes Back surgery (01/30/2014); Back surgery (Bilateral); Colonoscopy; Cataract extraction; and Eye surgery (Right, 09/13/2024).     Social History  She reports that she has never smoked. She has never been exposed to tobacco smoke. She has never used smokeless tobacco. She reports that she does not currently use alcohol after a past usage of about 1.0 standard drink of alcohol per week. Drug use questions deferred to the physician.     Allergies  Morphine, Other, and Hay fever and allergy relief    ROS  Patient denies ocular pain, redness, discharge, decreased vision, double vision, blind spots, flashes, or floaters.     Physical Exam  Not recorded          Assessment/Plan       PPV SOR IOL exchange        I spent 15 minutes in the professional and overall care of this patient.      Umer Hidalgo MD PhD

## 2025-04-28 ENCOUNTER — APPOINTMENT (OUTPATIENT)
Dept: OPHTHALMOLOGY | Age: 65
End: 2025-04-28
Payer: MEDICARE

## 2025-04-28 ENCOUNTER — HOSPITAL ENCOUNTER (OUTPATIENT)
Dept: RADIOLOGY | Facility: HOSPITAL | Age: 65
Discharge: HOME | End: 2025-04-28
Payer: MEDICARE

## 2025-04-28 DIAGNOSIS — H18.421 BAND KERATOPATHY OF RIGHT EYE: Primary | ICD-10-CM

## 2025-04-28 DIAGNOSIS — E78.5 HYPERLIPIDEMIA, UNSPECIFIED HYPERLIPIDEMIA TYPE: ICD-10-CM

## 2025-04-28 PROCEDURE — 75571 CT HRT W/O DYE W/CA TEST: CPT

## 2025-04-28 PROCEDURE — 99213 OFFICE O/P EST LOW 20 MIN: CPT | Performed by: OPHTHALMOLOGY

## 2025-04-28 ASSESSMENT — ENCOUNTER SYMPTOMS
MUSCULOSKELETAL NEGATIVE: 0
CONSTITUTIONAL NEGATIVE: 0
PSYCHIATRIC NEGATIVE: 0
EYES NEGATIVE: 1
HEMATOLOGIC/LYMPHATIC NEGATIVE: 0
ALLERGIC/IMMUNOLOGIC NEGATIVE: 0
NEUROLOGICAL NEGATIVE: 0
GASTROINTESTINAL NEGATIVE: 0
ENDOCRINE NEGATIVE: 0
CARDIOVASCULAR NEGATIVE: 0
RESPIRATORY NEGATIVE: 0

## 2025-04-28 ASSESSMENT — VISUAL ACUITY
METHOD: SNELLEN - LINEAR
OD_SC: CF @ FACE
OS_PH_SC: 20/50
OS_SC: 20/70

## 2025-04-28 ASSESSMENT — EXTERNAL EXAM - RIGHT EYE: OD_EXAM: NORMAL

## 2025-04-28 ASSESSMENT — SLIT LAMP EXAM - LIDS: COMMENTS: NORMAL

## 2025-04-28 ASSESSMENT — CUP TO DISC RATIO: OD_RATIO: 0.3

## 2025-05-02 ASSESSMENT — EXTERNAL EXAM - LEFT EYE: OS_EXAM: NORMAL

## 2025-05-02 ASSESSMENT — SLIT LAMP EXAM - LIDS: COMMENTS: NORMAL

## 2025-05-02 ASSESSMENT — CUP TO DISC RATIO: OS_RATIO: 0.3

## 2025-05-02 NOTE — PROGRESS NOTES
Macula-off rhegmatogenous retinal detachment of right eyeH33.001  Band keratopathy  - mostly due to silicon oil in the anterior chamber (AC)  Will schedule for Band chelation before proceeding with combined intraocular lens (IOL) refixation vs exchange and silicone oil removal case by Dr. Hidalgo on 6/2/2025      Plan:   -c/w lumigan OD       Orx:  Lumigan HS OD       Plan:  For Band chelation OD next week.

## 2025-05-07 ENCOUNTER — PROCEDURE VISIT (OUTPATIENT)
Dept: OPHTHALMOLOGY | Facility: CLINIC | Age: 65
End: 2025-05-07
Payer: MEDICARE

## 2025-05-07 DIAGNOSIS — H18.421 BAND KERATOPATHY OF RIGHT EYE: Primary | ICD-10-CM

## 2025-05-07 PROCEDURE — 65436 CURETTE/TREAT CORNEA: CPT | Performed by: OPHTHALMOLOGY

## 2025-05-07 RX ORDER — PREDNISOLONE ACETATE 10 MG/ML
1 SUSPENSION/ DROPS OPHTHALMIC 2 TIMES DAILY
Qty: 5 ML | Refills: 0 | Status: SHIPPED | OUTPATIENT
Start: 2025-05-07 | End: 2025-05-21

## 2025-05-07 RX ORDER — MOXIFLOXACIN 5 MG/ML
1 SOLUTION/ DROPS OPHTHALMIC 4 TIMES DAILY
Qty: 5 ML | Refills: 0 | Status: SHIPPED | OUTPATIENT
Start: 2025-05-07 | End: 2025-05-17

## 2025-05-07 ASSESSMENT — TONOMETRY
IOP_METHOD: TONOPEN
OS_IOP_MMHG: 12
OD_IOP_MMHG: 12

## 2025-05-07 ASSESSMENT — EXTERNAL EXAM - LEFT EYE: OS_EXAM: NORMAL

## 2025-05-07 ASSESSMENT — VISUAL ACUITY
OS_CC: 20/50
METHOD: SNELLEN - LINEAR
OS_CC+: -1
OD_SC: CF AT FACE
CORRECTION_TYPE: GLASSES

## 2025-05-07 ASSESSMENT — SLIT LAMP EXAM - LIDS
COMMENTS: NORMAL
COMMENTS: NORMAL

## 2025-05-07 ASSESSMENT — EXTERNAL EXAM - RIGHT EYE: OD_EXAM: NORMAL

## 2025-05-09 DIAGNOSIS — M25.551 RIGHT HIP PAIN: ICD-10-CM

## 2025-05-12 RX ORDER — MELOXICAM 7.5 MG/1
7.5 TABLET ORAL DAILY
Qty: 90 TABLET | Refills: 3 | Status: SHIPPED | OUTPATIENT
Start: 2025-05-12 | End: 2026-05-12

## 2025-05-13 ENCOUNTER — APPOINTMENT (OUTPATIENT)
Dept: OPHTHALMOLOGY | Age: 65
End: 2025-05-13
Payer: MEDICARE

## 2025-05-13 DIAGNOSIS — H18.421 BAND KERATOPATHY OF RIGHT EYE: Primary | ICD-10-CM

## 2025-05-13 ASSESSMENT — ENCOUNTER SYMPTOMS
HEMATOLOGIC/LYMPHATIC NEGATIVE: 0
CONSTITUTIONAL NEGATIVE: 0
RESPIRATORY NEGATIVE: 0
CARDIOVASCULAR NEGATIVE: 0
ALLERGIC/IMMUNOLOGIC NEGATIVE: 0
ENDOCRINE NEGATIVE: 0
EYES NEGATIVE: 1
GASTROINTESTINAL NEGATIVE: 0
NEUROLOGICAL NEGATIVE: 0
MUSCULOSKELETAL NEGATIVE: 0
PSYCHIATRIC NEGATIVE: 0

## 2025-05-13 ASSESSMENT — VISUAL ACUITY
OD_SC: CF AT 2'
METHOD: SNELLEN - LINEAR
OS_SC: 20/50

## 2025-05-17 ASSESSMENT — EXTERNAL EXAM - LEFT EYE: OS_EXAM: NORMAL

## 2025-05-17 ASSESSMENT — TONOMETRY
IOP_METHOD: GOLDMANN APPLANATION
OD_IOP_MMHG: 10

## 2025-05-17 ASSESSMENT — SLIT LAMP EXAM - LIDS
COMMENTS: NORMAL
COMMENTS: NORMAL

## 2025-05-17 ASSESSMENT — EXTERNAL EXAM - RIGHT EYE: OD_EXAM: NORMAL

## 2025-05-17 NOTE — PROGRESS NOTES
Macula-off rhegmatogenous retinal detachment of right eyeH33.001  Band keratopathy  - mostly due to silicon oil in the anterior chamber (AC)  Will schedule for Band chelation before proceeding with combined intraocular lens (IOL) refixation vs exchange and silicone oil removal case by Dr. Hidalgo on 6/2/2025      1 week post EDTA chelation for band keratopathy, CL removed, healed epithelium.     -c/w lumigan OD     Plan:  Stop Moxi  Decrease Pred to Bid for 1 week then stop  See Dr. Hidalgo before planned surgery

## 2025-05-19 ENCOUNTER — APPOINTMENT (OUTPATIENT)
Dept: OPHTHALMOLOGY | Facility: CLINIC | Age: 65
End: 2025-05-19
Payer: MEDICARE

## 2025-05-19 DIAGNOSIS — H33.001 MACULA-ON RHEGMATOGENOUS RETINAL DETACHMENT OF RIGHT EYE: Primary | ICD-10-CM

## 2025-05-19 PROCEDURE — 99024 POSTOP FOLLOW-UP VISIT: CPT

## 2025-05-19 ASSESSMENT — CONF VISUAL FIELD
OD_INFERIOR_NASAL_RESTRICTION: 0
OS_INFERIOR_NASAL_RESTRICTION: 0
OS_SUPERIOR_TEMPORAL_RESTRICTION: 0
OS_SUPERIOR_NASAL_RESTRICTION: 0
OD_SUPERIOR_TEMPORAL_RESTRICTION: 0
OD_SUPERIOR_NASAL_RESTRICTION: 0
OD_INFERIOR_TEMPORAL_RESTRICTION: 0
OS_INFERIOR_TEMPORAL_RESTRICTION: 0

## 2025-05-19 ASSESSMENT — ENCOUNTER SYMPTOMS
PSYCHIATRIC NEGATIVE: 0
ENDOCRINE NEGATIVE: 0
HEMATOLOGIC/LYMPHATIC NEGATIVE: 0
ALLERGIC/IMMUNOLOGIC NEGATIVE: 0
NEUROLOGICAL NEGATIVE: 0
EYES NEGATIVE: 1
RESPIRATORY NEGATIVE: 0
MUSCULOSKELETAL NEGATIVE: 0
GASTROINTESTINAL NEGATIVE: 0
CARDIOVASCULAR NEGATIVE: 0
CONSTITUTIONAL NEGATIVE: 0

## 2025-05-19 ASSESSMENT — SLIT LAMP EXAM - LIDS
COMMENTS: NORMAL
COMMENTS: NORMAL

## 2025-05-19 ASSESSMENT — EXTERNAL EXAM - LEFT EYE: OS_EXAM: NORMAL

## 2025-05-19 ASSESSMENT — VISUAL ACUITY
CORRECTION_TYPE: GLASSES
METHOD: SNELLEN - LINEAR

## 2025-05-19 ASSESSMENT — EXTERNAL EXAM - RIGHT EYE: OD_EXAM: NORMAL

## 2025-05-19 ASSESSMENT — TONOMETRY: IOP_METHOD: TONOPEN

## 2025-05-19 NOTE — PROGRESS NOTES
Macula-off rhegmatogenous retinal detachment of right eyeH33.001    OCT : 05/19/25   OD: Abnormal Foveal Contour, stable cystoid macular edema (CME). macula fully attached  OS: normal foveal contour, no cystoid macular edema (CME). EZ intact      S/p PPV/MP/retinectomy/EL/SO exchange OD (11/7/24)  Today Vision CF, IOP 16  Retina appears attached, good silicone oil fill  trace heme at inferior retinectomy margin - will monitor closely    Impression/Plan  Retina is flat under SO; retinectomy edges seems stable despite traction from anterior retina. SFIOL (Yamane) is tilted   Discussed risks, benefits and alternatives of treatment options. Discussed guarded prognosis from repeated retinal detachment and risk of redatchment after SOR  Patients wishes to proceed with surgery   Schedule for silicone oil removal with IOL exchange    Scleral fixated intraocular lens (IOL)  -c/f early tilting, consider combined intraocular lens (IOL) refixation vs exchange + silicone oil removal case   IOL power 19D    Band keratopathy  -suspect due to SO - Will schedule for SOR  - Consult with cornea    Plan:   -c/w lumigan OD     # Prior Course:   - hx traumatic subluxated crystalline lens s/p PPV/lensectomy/ SFIOL (Yamane ) 05/24/24  - Subsequent Mac-On RD s/p PPV/EL/Gas (7/17/24)  - Subsequent PVR RD s/p PPV/MP/Retinectomy/EL/SO (9/23/24)    Orx:  Lumigan HS OD     FU Dr. Hidalgo for consideration of combined intraocular lens (IOL) refixation vs exchange and silicone oil removal case

## 2025-05-29 ENCOUNTER — ANESTHESIA EVENT (OUTPATIENT)
Dept: OPERATING ROOM | Facility: CLINIC | Age: 65
End: 2025-05-29
Payer: MEDICARE

## 2025-06-02 ENCOUNTER — HOSPITAL ENCOUNTER (OUTPATIENT)
Facility: CLINIC | Age: 65
Setting detail: OUTPATIENT SURGERY
Discharge: HOME | End: 2025-06-02
Payer: MEDICARE

## 2025-06-02 ENCOUNTER — ANESTHESIA (OUTPATIENT)
Dept: OPERATING ROOM | Facility: CLINIC | Age: 65
End: 2025-06-02
Payer: MEDICARE

## 2025-06-02 VITALS
DIASTOLIC BLOOD PRESSURE: 65 MMHG | TEMPERATURE: 96.8 F | HEIGHT: 64 IN | HEART RATE: 69 BPM | WEIGHT: 160.94 LBS | BODY MASS INDEX: 27.48 KG/M2 | OXYGEN SATURATION: 98 % | SYSTOLIC BLOOD PRESSURE: 123 MMHG | RESPIRATION RATE: 16 BRPM

## 2025-06-02 DIAGNOSIS — H43.11 VITREOUS HEMORRHAGE OF RIGHT EYE (MULTI): ICD-10-CM

## 2025-06-02 DIAGNOSIS — H33.41 RETINAL DETACHMENT, TRACTIONAL, RIGHT: Primary | ICD-10-CM

## 2025-06-02 DIAGNOSIS — Z98.890 SILICONE OIL IN EYE AFTER VITRECTOMY: ICD-10-CM

## 2025-06-02 DIAGNOSIS — H35.21 PROLIFERATIVE VITREORETINOPATHY, RIGHT: ICD-10-CM

## 2025-06-02 DIAGNOSIS — H26.131 TOTAL TRAUMATIC CATARACT, RIGHT EYE: ICD-10-CM

## 2025-06-02 DIAGNOSIS — H27.131 POSTERIOR DISLOCATION OF RIGHT LENS: ICD-10-CM

## 2025-06-02 PROBLEM — R11.2 PONV (POSTOPERATIVE NAUSEA AND VOMITING): Status: ACTIVE | Noted: 2025-06-02

## 2025-06-02 PROBLEM — T88.59XA DELAYED EMERGENCE FROM ANESTHESIA: Status: ACTIVE | Noted: 2025-06-02

## 2025-06-02 PROCEDURE — 2720000007 HC OR 272 NO HCPCS

## 2025-06-02 PROCEDURE — 99140 ANES COMP EMERGENCY COND: CPT | Performed by: ANESTHESIOLOGY

## 2025-06-02 PROCEDURE — 67113 REPAIR RETINAL DETACH CPLX: CPT | Performed by: STUDENT IN AN ORGANIZED HEALTH CARE EDUCATION/TRAINING PROGRAM

## 2025-06-02 PROCEDURE — 2500000005 HC RX 250 GENERAL PHARMACY W/O HCPCS

## 2025-06-02 PROCEDURE — 3700000002 HC GENERAL ANESTHESIA TIME - EACH INCREMENTAL 1 MINUTE

## 2025-06-02 PROCEDURE — 2500000004 HC RX 250 GENERAL PHARMACY W/ HCPCS (ALT 636 FOR OP/ED)

## 2025-06-02 PROCEDURE — A67113 PR REPAIR COMPLEX RETINA DETACH VITRECTOMY AND MEMB PEEL: Performed by: ANESTHESIOLOGY

## 2025-06-02 PROCEDURE — 3600000003 HC OR TIME - INITIAL BASE CHARGE - PROCEDURE LEVEL THREE

## 2025-06-02 PROCEDURE — 2500000004 HC RX 250 GENERAL PHARMACY W/ HCPCS (ALT 636 FOR OP/ED): Performed by: ANESTHESIOLOGIST ASSISTANT

## 2025-06-02 PROCEDURE — 3700000001 HC GENERAL ANESTHESIA TIME - INITIAL BASE CHARGE

## 2025-06-02 PROCEDURE — 67113 REPAIR RETINAL DETACH CPLX: CPT

## 2025-06-02 PROCEDURE — 3600000008 HC OR TIME - EACH INCREMENTAL 1 MINUTE - PROCEDURE LEVEL THREE

## 2025-06-02 PROCEDURE — A67113 PR REPAIR COMPLEX RETINA DETACH VITRECTOMY AND MEMB PEEL: Performed by: ANESTHESIOLOGIST ASSISTANT

## 2025-06-02 PROCEDURE — 7100000009 HC PHASE TWO TIME - INITIAL BASE CHARGE

## 2025-06-02 PROCEDURE — 7100000010 HC PHASE TWO TIME - EACH INCREMENTAL 1 MINUTE

## 2025-06-02 RX ORDER — PREDNISOLONE ACETATE 10 MG/ML
1 SUSPENSION/ DROPS OPHTHALMIC 4 TIMES DAILY
Qty: 10 ML | Refills: 1 | Status: SHIPPED | OUTPATIENT
Start: 2025-06-02 | End: 2025-07-02

## 2025-06-02 RX ORDER — FENTANYL CITRATE 50 UG/ML
INJECTION, SOLUTION INTRAMUSCULAR; INTRAVENOUS AS NEEDED
Status: DISCONTINUED | OUTPATIENT
Start: 2025-06-02 | End: 2025-06-02

## 2025-06-02 RX ORDER — LIDOCAINE HYDROCHLORIDE 10 MG/ML
0.1 INJECTION, SOLUTION EPIDURAL; INFILTRATION; INTRACAUDAL; PERINEURAL ONCE
Status: DISCONTINUED | OUTPATIENT
Start: 2025-06-02 | End: 2025-06-02 | Stop reason: HOSPADM

## 2025-06-02 RX ORDER — POVIDONE-IODINE 5 %
SOLUTION, NON-ORAL OPHTHALMIC (EYE) AS NEEDED
Status: DISCONTINUED | OUTPATIENT
Start: 2025-06-02 | End: 2025-06-02 | Stop reason: HOSPADM

## 2025-06-02 RX ORDER — INDOCYANINE GREEN AND WATER 25 MG
KIT INJECTION AS NEEDED
Status: DISCONTINUED | OUTPATIENT
Start: 2025-06-02 | End: 2025-06-02 | Stop reason: HOSPADM

## 2025-06-02 RX ORDER — ONDANSETRON HYDROCHLORIDE 2 MG/ML
4 INJECTION, SOLUTION INTRAVENOUS ONCE AS NEEDED
Status: DISCONTINUED | OUTPATIENT
Start: 2025-06-02 | End: 2025-06-02 | Stop reason: HOSPADM

## 2025-06-02 RX ORDER — CEFAZOLIN 1 G/1
INJECTION, POWDER, FOR SOLUTION INTRAVENOUS AS NEEDED
Status: DISCONTINUED | OUTPATIENT
Start: 2025-06-02 | End: 2025-06-02 | Stop reason: HOSPADM

## 2025-06-02 RX ORDER — LABETALOL HYDROCHLORIDE 5 MG/ML
5 INJECTION, SOLUTION INTRAVENOUS ONCE AS NEEDED
Status: DISCONTINUED | OUTPATIENT
Start: 2025-06-02 | End: 2025-06-02 | Stop reason: HOSPADM

## 2025-06-02 RX ORDER — WATER 1 ML/ML
INJECTION IRRIGATION AS NEEDED
Status: DISCONTINUED | OUTPATIENT
Start: 2025-06-02 | End: 2025-06-02 | Stop reason: HOSPADM

## 2025-06-02 RX ORDER — ACETAMINOPHEN 325 MG/1
650 TABLET ORAL EVERY 4 HOURS PRN
Status: DISCONTINUED | OUTPATIENT
Start: 2025-06-02 | End: 2025-06-02 | Stop reason: HOSPADM

## 2025-06-02 RX ORDER — OFLOXACIN 3 MG/ML
1 SOLUTION/ DROPS OPHTHALMIC 4 TIMES DAILY
Qty: 5 ML | Refills: 0 | Status: SHIPPED | OUTPATIENT
Start: 2025-06-02 | End: 2025-06-09

## 2025-06-02 RX ORDER — TROPICAMIDE 10 MG/ML
1 SOLUTION/ DROPS OPHTHALMIC
Status: COMPLETED | OUTPATIENT
Start: 2025-06-02 | End: 2025-06-02

## 2025-06-02 RX ORDER — FENTANYL CITRATE 50 UG/ML
50 INJECTION, SOLUTION INTRAMUSCULAR; INTRAVENOUS EVERY 5 MIN PRN
Status: DISCONTINUED | OUTPATIENT
Start: 2025-06-02 | End: 2025-06-02 | Stop reason: HOSPADM

## 2025-06-02 RX ORDER — MIDAZOLAM HYDROCHLORIDE 1 MG/ML
INJECTION, SOLUTION INTRAMUSCULAR; INTRAVENOUS AS NEEDED
Status: DISCONTINUED | OUTPATIENT
Start: 2025-06-02 | End: 2025-06-02

## 2025-06-02 RX ORDER — PROPARACAINE HYDROCHLORIDE 5 MG/ML
1 SOLUTION/ DROPS OPHTHALMIC ONCE
Status: COMPLETED | OUTPATIENT
Start: 2025-06-02 | End: 2025-06-02

## 2025-06-02 RX ORDER — PHENYLEPHRINE HYDROCHLORIDE 25 MG/ML
1 SOLUTION/ DROPS OPHTHALMIC
Status: COMPLETED | OUTPATIENT
Start: 2025-06-02 | End: 2025-06-02

## 2025-06-02 RX ORDER — ONDANSETRON HYDROCHLORIDE 2 MG/ML
INJECTION, SOLUTION INTRAVENOUS AS NEEDED
Status: DISCONTINUED | OUTPATIENT
Start: 2025-06-02 | End: 2025-06-02

## 2025-06-02 RX ORDER — PROPOFOL 10 MG/ML
INJECTION, EMULSION INTRAVENOUS AS NEEDED
Status: DISCONTINUED | OUTPATIENT
Start: 2025-06-02 | End: 2025-06-02

## 2025-06-02 RX ORDER — DEXAMETHASONE SODIUM PHOSPHATE 10 MG/ML
INJECTION INTRAMUSCULAR; INTRAVENOUS AS NEEDED
Status: DISCONTINUED | OUTPATIENT
Start: 2025-06-02 | End: 2025-06-02 | Stop reason: HOSPADM

## 2025-06-02 RX ORDER — TRIAMCINOLONE ACETONIDE 40 MG/ML
INJECTION, SUSPENSION INTRA-ARTICULAR; INTRAMUSCULAR AS NEEDED
Status: DISCONTINUED | OUTPATIENT
Start: 2025-06-02 | End: 2025-06-02 | Stop reason: HOSPADM

## 2025-06-02 RX ORDER — FENTANYL CITRATE 50 UG/ML
25 INJECTION, SOLUTION INTRAMUSCULAR; INTRAVENOUS EVERY 5 MIN PRN
Status: DISCONTINUED | OUTPATIENT
Start: 2025-06-02 | End: 2025-06-02 | Stop reason: HOSPADM

## 2025-06-02 RX ADMIN — FENTANYL CITRATE 25 MCG: 50 INJECTION, SOLUTION INTRAMUSCULAR; INTRAVENOUS at 09:24

## 2025-06-02 RX ADMIN — ONDANSETRON 4 MG: 2 INJECTION, SOLUTION INTRAMUSCULAR; INTRAVENOUS at 09:24

## 2025-06-02 RX ADMIN — TROPICAMIDE 1 DROP: 10 SOLUTION/ DROPS OPHTHALMIC at 08:05

## 2025-06-02 RX ADMIN — FENTANYL CITRATE 25 MCG: 50 INJECTION, SOLUTION INTRAMUSCULAR; INTRAVENOUS at 10:20

## 2025-06-02 RX ADMIN — TROPICAMIDE 1 DROP: 10 SOLUTION/ DROPS OPHTHALMIC at 08:00

## 2025-06-02 RX ADMIN — MIDAZOLAM 2 MG: 1 INJECTION INTRAMUSCULAR; INTRAVENOUS at 09:24

## 2025-06-02 RX ADMIN — TROPICAMIDE 1 DROP: 10 SOLUTION/ DROPS OPHTHALMIC at 08:10

## 2025-06-02 RX ADMIN — FENTANYL CITRATE 50 MCG: 50 INJECTION, SOLUTION INTRAMUSCULAR; INTRAVENOUS at 09:39

## 2025-06-02 RX ADMIN — PHENYLEPHRINE HYDROCHLORIDE 1 DROP: 25 SOLUTION/ DROPS OPHTHALMIC at 08:00

## 2025-06-02 RX ADMIN — PHENYLEPHRINE HYDROCHLORIDE 1 DROP: 25 SOLUTION/ DROPS OPHTHALMIC at 08:05

## 2025-06-02 RX ADMIN — PROPOFOL 40 MG: 10 INJECTION, EMULSION INTRAVENOUS at 09:27

## 2025-06-02 RX ADMIN — PROPARACAINE HYDROCHLORIDE 1 DROP: 5 SOLUTION/ DROPS OPHTHALMIC at 08:00

## 2025-06-02 RX ADMIN — PHENYLEPHRINE HYDROCHLORIDE 1 DROP: 25 SOLUTION/ DROPS OPHTHALMIC at 08:10

## 2025-06-02 ASSESSMENT — PAIN - FUNCTIONAL ASSESSMENT
PAIN_FUNCTIONAL_ASSESSMENT: 0-10

## 2025-06-02 ASSESSMENT — PAIN SCALES - GENERAL
PAINLEVEL_OUTOF10: 6
PAINLEVEL_OUTOF10: 0 - NO PAIN
PAINLEVEL_OUTOF10: 2
PAINLEVEL_OUTOF10: 2

## 2025-06-02 ASSESSMENT — ENCOUNTER SYMPTOMS
WHEEZING: 0
SHORTNESS OF BREATH: 0
CHILLS: 0
ABDOMINAL PAIN: 0
FEVER: 0
COUGH: 0
PALPITATIONS: 0

## 2025-06-02 NOTE — BRIEF OP NOTE
Date: 2025  OR Location: [facility] – [place] [facility] OR    Name: Saskia Koehler, : 1960, Age: 65 y.o., MRN: [id number], Sex: female    Diagnosis  Pre-op Diagnosis      * Posterior dislocation of right lens [H27.131]     * Silicone oil in eye after vitrectomy [Z98.890] Post-op Diagnosis     * Posterior dislocation of right lens [H27.131]     * Silicone oil in eye after vitrectomy [Z98.890]     Procedures  CPT: 64980 - Complex Retinal Detachment Repair: Silicone oil removal, Pars plana vitrectomy, membrane peel, endolaser, intraocular lens repositioning ( laser lock technique) - Right Eye    Surgeons      * Umer Hidalgo - Primary    Resident/Fellow/Other Assistant:  Surgeons and Role:     * Shukri Corral MD - Assisting    Staff:   Chato: Marine Cat Person: Gisell    Anesthesia Staff: Anesthesiologist: Odalis Persaud DO  C-AA: INOCENCIO Luna    Procedure Summary  Anesthesia: Monitor Anesthesia Care  ASA: II  Estimated Blood Loss: 0 mL  Intra-op Medications:   Administrations occurring from 0903 to 1043 on 25:   Medication Name Total Dose   balanced salts (BSS) intraocular solution 15 mL   balanced salts (BSS Plus) intraocular solution 500 mL   sterile water irrigation solution 100 mL   ceFAZolin (Ancef) injection 0.01 mg   dexAMETHasone (Decadron) injection 0.1 mg   tobramycin-dexamethasone (Tobradex) ophthalmic ointment 0.5 inch   povidone-iodine 5 % ophthalmic solution 1 Application   chondroitin sulf-sod hyaluron (Viscoat) intraocular injection 1 mL   lidocaine PF (Xylocaine) 20 mg/mL (2 %) 5 mL, bupivacaine PF 0.75 % (Marcaine) 5 mL syringe 10 mL   fentaNYL (Sublimaze) injection 50 mcg/mL 100 mcg   midazolam (Versed) injection 1 mg/mL 2 mg   ondansetron (Zofran) 2 mg/mL injection 4 mg   propofol (Diprivan) injection 10 mg/mL 40 mg            Anesthesia Record               Intraprocedure I/O Totals       None           Specimen: No specimens collected     Findings: Posterior synechiae,  intraocular lens tilt, tractional retinal detachment with proliferative vitreoretinopathy - right eye    Complications:  None; patient tolerated the procedure well.     Disposition: PACU - hemodynamically stable.  Condition: stable  Specimens Collected: No specimens collected    Attending Attestation:     A qualified resident physician was not available and the use of a skilled assistant surgeon, Shukri Corral MD, was required for the following portions of this case:  Silicone oil removal, Pars plana vitrectomy, membrane peel, endolaser, intraocular lens repositioning ( laser lock technique) - Right Eye      Umer Hidalgo  Phone Number: [phone]

## 2025-06-02 NOTE — DISCHARGE INSTRUCTIONS
Please keep the eye patched/shielded until your post op day 1 appointment tomorrow.    Appointment:  [facility], Umer Hidalgo MD - Please report to clinic at 10:00AM as scheduled     Please do not perform any strenuous activity, bending below the waist, until you are cleared by your doctor.     Post Operative Medications: You will start your eye drops tomorrow after your post operative day 1 appointment   Antibiotic (Tan Cap) - 4 times per day              Steroid (Pink Cap) - 4 times per day   The order of drop instillation does not matter but you must wait atleast 5 minutes in between drops to ensure that the medications absorb properly

## 2025-06-02 NOTE — H&P
History Of Present Illness  Saskia Koehler is a 65 y.o. female presenting with intraocular silicone oil after vitrectomy and subluxated intraocular lens - RIGHT EYE, in the setting of prior retinal detachment repair.     Past Medical History  She has a past medical history of Adverse effect of anesthesia, Corneal abrasion, Diverticulosis, Eye trauma, Glaucoma, Nephrolithiasis, Retinal detachment, Rhegmatogenous retinal detachment of right eye, and Vitreous prolapse of right eye.    Surgical History  She has a past surgical history that includes Back surgery (01/30/2014); Back surgery (Bilateral); Colonoscopy; Cataract extraction; and Eye surgery (Right, 09/13/2024).     Social History  She reports that she has never smoked. She has never been exposed to tobacco smoke. She has never used smokeless tobacco. She reports that she does not currently use alcohol after a past usage of about 7.0 standard drinks of alcohol per week. She reports that she does not currently use drugs.     Allergies  Morphine, Other, and Hay fever and allergy relief    ROS  Patient denies ocular pain, redness, discharge, decreased vision, double vision, blind spots, flashes, or floaters.    Review of Systems   Constitutional: Negative for chills and fever.   Cardiovascular:  Negative for chest pain and palpitations.   Respiratory:  Negative for cough, shortness of breath and wheezing.    Gastrointestinal:  Negative for abdominal pain.          Physical Exam  Alert and oriented x 3  Regular radial pulses, bilateral  Normal chest rise with respirations        Assessment/Plan   Assessment & Plan  PONV (postoperative nausea and vomiting)    Delayed emergence from anesthesia    Posterior dislocation of right lens    Silicone oil in eye after vitrectomy      Will plan to proceed today with pars plana vitrectomy, silicone oil removal, intraocular lens exchange versus rescue - RIGHT EYE    I spent 10 minutes in the professional and overall care of  this patient.      Shukri Corral MD

## 2025-06-02 NOTE — OP NOTE
Vitrectomy, SOR , MEMBRANE PEEL. endolaser (R) Operative Note     Date: 2025  [place] [facility] [place]    Name: Sasika Koehler YOB: 1960, Age: 65 y.o., MRN: [id number], Sex: female    Diagnosis  Pre-op Diagnosis      * Posterior dislocation of right lens [H27.131]     * Silicone oil in eye after vitrectomy [Z98.890] Post-op Diagnosis     * Posterior dislocation of right lens [H27.131]     * Silicone oil in eye after vitrectomy [Z98.890]     * Proliferative vitreoretinopathy, right [H35.21]     * Vitreous hemorrhage of right eye (Multi) [H43.11]     Procedures  CPT: 65642 - Complex Retinal Detachment Repair: Silicone oil removal, Pars plana vitrectomy, membrane peel, endolaser, intraocular lens repositioning ( laser lock technique) - Right Eye    Surgeons      * Umer Hidalgo - Primary    Resident/Fellow/Other Assistant:  Surgeons and Role:     * Shukri Corral MD - Assisting    Staff:   Riccardoulator: Marine Cat Person: Gisell    Anesthesia Staff: Anesthesiologist: Odalis Persaud DO  C-AA: INOCENCIO Luna    Procedure Summary  Anesthesia: Monitor Anesthesia Care  ASA: II  Estimated Blood Loss: 0 mL  Intra-op Medications:   Administrations occurring from 0903 to 1043 on 25:   Medication Name Total Dose   balanced salts (BSS) intraocular solution 15 mL   balanced salts (BSS Plus) intraocular solution 500 mL   sterile water irrigation solution 100 mL   ceFAZolin (Ancef) injection 0.01 mg   dexAMETHasone (Decadron) injection 0.1 mg   tobramycin-dexamethasone (Tobradex) ophthalmic ointment 0.5 inch   povidone-iodine 5 % ophthalmic solution 1 Application   chondroitin sulf-sod hyaluron (Viscoat) intraocular injection 1 mL   lidocaine PF (Xylocaine) 20 mg/mL (2 %) 5 mL, bupivacaine PF 0.75 % (Marcaine) 5 mL syringe 10 mL   fentaNYL (Sublimaze) injection 50 mcg/mL 100 mcg   midazolam (Versed) injection 1 mg/mL 2 mg   ondansetron (Zofran) 2 mg/mL injection 4 mg   propofol (Diprivan) injection 10  mg/mL 40 mg          Anesthesia Record               Intraprocedure I/O Totals       None           Specimen: No specimens collected     Drains and/or Catheters: * None in log *    Tourniquet Times: n/a        Implants: n/a    Findings: posterior synechiae, intraocular lens tilt, vitreous hemorrhage, tractional retinal detachment with proliferative vitreoretinopathy - right eye    Indications: Saskia Koehler is an 65 y.o. female who is having surgery for Posterior dislocation of right lens [H27.131]  Silicone oil in eye after vitrectomy [Z98.890].     The patient was seen in the preoperative area. The risks, benefits, complications, treatment options, non-operative alternatives, expected recovery and outcomes were discussed with the patient. The possibilities of reaction to medication, pulmonary aspiration, injury to surrounding structures, bleeding, recurrent infection, the need for additional procedures, failure to diagnose a condition, and creating a complication requiring transfusion or operation were discussed with the patient. The patient concurred with the proposed plan, giving informed consent.  The site of surgery was properly noted/marked if necessary per policy. The patient has been actively warmed in preoperative area. Preoperative antibiotics are not indicated. Venous thrombosis prophylaxis are not indicated.    Procedure Details:     DATE OF SURGERY: 6/2/2025    SURGEON: Umer Hidalgo MD    ASSISTANT: Shukri Corral MD    PREOPERATIVE DIAGNOSIS  Pre-Op Diagnosis Codes:      * Posterior dislocation of right lens [H27.131]     * Silicone oil in eye after vitrectomy [Z98.890]    POSTOPERATIVE DIAGNOSIS   Post-op Diagnosis     * Posterior dislocation of right lens [H27.131]     * Silicone oil in eye after vitrectomy [Z98.890]     * Proliferative vitreoretinopathy, right [H35.21]     * Vitreous hemorrhage of right eye (Multi) [H43.11]    OPERATION PERFORMED  Repair of complex retinal detachment  with:  25-gauge pars plana vitrectomy, silicone oil removal, membrane peel, endolaser, intraocular lens repositioning ( laser lock technique) - Right Eye    ANESTHESIA  Monitored anesthesia care with a standard block consisting of a 1:1 mixture of 4 %  lidocaine and 0.75% Marcaine with Wydase     FINDINGS  As detailed below     COMPLICATIONS  None     ESTIMATED BLOOD LOSS   Minimal     SPECIMENS REMOVED  None     JUSTIFICATION  Ms. Koehler is a 65 y.o. female with posterior synechiae, intraocular lens tilt, tractional retinal detachment with proliferative vitreoretinopathy - right eye. This was causing decreased visual function. The risks, benefits, and alternatives to the procedure were discussed with the patient including the risk for vision loss, blindness, retinal detachment, infection, bleeding, pain, high or low pressure in the eye, double vision, no benefit, need for additional procedures, and droopy eyelid, amongst others. The patient had a full opportunity to have all questions answered in clinic prior to surgery. Afterwards, the patient requested that we perform surgery and signed the consent form.     PROCEDURE:   The patient was brought to the preoperative holding area where the correct eye was confirmed and marked. The patient then underwent intravenous sedation by the anesthesia team followed by a block as described above. The patient was then brought to the operating room where a secondary time-out was performed to identify the correct patient, eye, procedure, and any allergies. The eye was prepped and draped in the usual sterile ophthalmic fashion followed by placement of a lid speculum.     A 25-gauge trocar was placed in the inferotemporal quadrant 3.5 mm posterior to the limbus after conjunctival displacement.  A 4 mm infusion cannula was placed through this trocar, and the infusion cannula was confirmed in the vitreous cavity prior to turning it on. Two additional 25-gauge trocars were placed in  a similar fashion in the superonasal and superotemporal quadrants 3.5  mm posterior to the limbus after conjunctival displacement.     At this time, a standard three-port pars plana vitrectomy was performed using the light pipe, the cutter, and the BIOM viewing system. The silicone oil was removed from the posterior segment with the viscous-fluid extraction system on aspiration. After removal of the silicone oil, the red reflex appeared dim. The vitrector and light pipe were introduced into the posterior segment and there was evidence of vitreous hemorrhage. The hemorrhage was evacuated from the posterior segment with the vitrector on aspiration. The view improved and the  retina appeared attached, however, there were bands proliferative vitreoretinopathy versus ischemic retina in between anterior and posterior leaflets of the prior retinectomy which had hemorrhagic debris.     Our attention was then directed towards the anterior segment. The patient had posterior synechiae along the inferior hemisphere of her intraocular lens (IOL) which bisected the IOL optic and was caused anterior tilt of the IOL. 1 mm paracentesis incisions were made in each quadrant of the corneal limbus. A sinsky hook was then used to perform synechiolysis which freed all the adhesions from the iris onto the IOL. Iris hooks were then placed which revealed that were were pigmented debris and retained capsular remnants around the haptics both the anterior/posterior surfaces of the IOL which appeared to be induced torque on the IOL. Addition paracentesis incisions were made in the corneal limbus and the vitrector was introduced to remove all capsular remnants from the IOL haptics and the antior optic surface. The process was repeated for all fragments on the posterior surface of the IOL optics. After removal of the fibrotic capsular remains the IOL appeared well centered but still had a mild 10-15 degree anterior lens tilt of the inferior aspect  of the optic. Given the good appearance of the IOL, the decision was made to defer IOL exchange and instead rescue the lens via laser locking technique. Using a bimanual techique, a sinsky was used to push the inferior edge of the IOL optic posteriorly until the optic was in the correct planar orientation. With the IOL in the correct plane, the endolaser was brought into the field and both haptic-optic junctions the 3 piece CT Nilsa IOL were lasered; laser was applied until the haptic-optic junctions appeared to johnny. After completion of the laser lock, the IOL appeared well centered with minimal residual tilt ( less than 5 degrees).     Our attention was then drawn to the posterior segment. Two serial air-fluid exchanges were performed with the vitrector on aspiration to remove and remaining spherules of silicone oil. After the second air-fluid exchange, the posterior segment was allowed to remain filled with BSS. Our view of the posterior segment had significant improved after removal of IOL capsular debris.  The inferior membranes connected to the posterior aspect of the anterior retinectomy edge were severed and peeled with the vitrector. Removal of these membranes appeared to relieve all residual inferior traction and the retina appeared flat. Scleral depression was peformed for 360 degrees and there was no evidence of peripheral holes, tears or subretinal fluid. However, there were areas of peripheral retina (posterior to the scleral buckle imbrication) which had not been previously lasered. The endolaser was brought into the field and 1-2 rows of confluent laser barricade were applied along the posterior edge of the scleral buckle imbrication from 7 to 12 oclock. Laser Settings: Power - 250-350mW, Duration: 100ms, Interval: 200, Shot Count: 692, Total energy: 19.83 Joules.     Finally, preoperative OCT scanning revealed that the patient had significant cystoid macular edema and an early epiretinal membrane  covering the macula. The internal limiting membrane (ILM) was stained with indocyanine green to improve visualization and ILM-ERM complex was removed off of the fovea and parafovea with a limited peel using the internal limiting membrane (ILM) forceps.      The superonasal trocar was removed and the sclerotomy was sutured with an interrupted 7-0 Vicryl.  The superotemporal trocar was removed and the sclerotomy was sutured with an interrupted 7-0 Vicryl. The infusion cannula and associated trocar were removed and the sclerotomy was sutured with an interrupted 7-0 Vicryl. The iris hooks were removed and the paracentesis incisions were hydrated with BSS on anterior chamber (AC) canula; all  corneal wounds were panfilo negative. The eye was confirmed to be at a physiologic level by digital palpation.    Subconjunctival injection of Cefazolin and Dexamethasone were administered. The lid speculum and drapes were removed. Antibiotic ointment was applied. The eye was patched and shielded. The patient tolerated the procedure well without any intraoperative or immediate postoperative complications. The patient was taken to the recovery room in good condition. The patient will follow up with Umer Hidalgo MD in clinic on the following morning.     Evidence of Infection: No   Complications:  None; patient tolerated the procedure well.    Disposition: PACU - hemodynamically stable.  Condition: stable       Additional Details:     A qualified resident physician was not available and the use of a skilled assistant surgeon, Shukri Corral MD, was required for the following portions of this case: Silicone oil removal, Pars plana vitrectomy, membrane peel, endolaser, intraocular lens repositioning ( laser lock technique) - Right Eye      Attending Attestation:     Umer Hidalgo  Phone Number: [phone]

## 2025-06-02 NOTE — ANESTHESIA PREPROCEDURE EVALUATION
Patient: Saskia Koehler    Procedure Information       Date/Time: 06/02/25 0903    Procedure: Vitrectomy, SOR, IOL exchange (Right: Eye)    [place] [facility] [place] [facility] [place] [facility] / [facility] [place] [facility]    Surgeons: Umer Hidalgo MD PhD            Relevant Problems   Anesthesia   (+) Delayed emergence from anesthesia   (+) PONV (postoperative nausea and vomiting)      Cardiac (within normal limits)      Pulmonary (within normal limits)      Neuro (within normal limits)   (-) PTSD (post-traumatic stress disorder)      GI (within normal limits)      /Renal (within normal limits)      Liver (within normal limits)      HEENT  Multiple eye surgeries       Clinical information reviewed:   Tobacco  Allergies  Meds   Med Hx  Surg Hx  OB Status  Fam Hx  Soc   Hx        NPO Detail:  NPO/Void Status  Date of Last Liquid: 06/01/25  Time of Last Liquid: 2100  Date of Last Solid: 06/01/25  Time of Last Solid: 2100  Last Intake Type: Clear fluids; Food         Physical Exam    Airway  Mallampati: III  TM distance: >3 FB  Neck ROM: full  Mouth opening: 3 or more finger widths     Cardiovascular    Dental - normal exam     Pulmonary    Abdominal            Anesthesia Plan    History of general anesthesia?: yes  History of complications of general anesthesia?: yes    ASA 2 - emergent     MAC     intravenous induction   Anesthetic plan and risks discussed with patient and spouse.    Plan discussed with CAA.

## 2025-06-03 ENCOUNTER — APPOINTMENT (OUTPATIENT)
Dept: OPHTHALMOLOGY | Facility: CLINIC | Age: 65
End: 2025-06-03
Payer: MEDICARE

## 2025-06-03 DIAGNOSIS — H33.001 MACULA-ON RHEGMATOGENOUS RETINAL DETACHMENT OF RIGHT EYE: Primary | ICD-10-CM

## 2025-06-03 DIAGNOSIS — H35.21 PROLIFERATIVE VITREORETINOPATHY OF RIGHT EYE: ICD-10-CM

## 2025-06-03 DIAGNOSIS — H35.372 EPIRETINAL MEMBRANE (ERM) OF LEFT EYE: ICD-10-CM

## 2025-06-03 PROCEDURE — 99024 POSTOP FOLLOW-UP VISIT: CPT

## 2025-06-03 ASSESSMENT — ENCOUNTER SYMPTOMS
CARDIOVASCULAR NEGATIVE: 0
PSYCHIATRIC NEGATIVE: 0
MUSCULOSKELETAL NEGATIVE: 0
GASTROINTESTINAL NEGATIVE: 0
CONSTITUTIONAL NEGATIVE: 0
EYES NEGATIVE: 0
ENDOCRINE NEGATIVE: 0
ALLERGIC/IMMUNOLOGIC NEGATIVE: 0
RESPIRATORY NEGATIVE: 0
NEUROLOGICAL NEGATIVE: 0
HEMATOLOGIC/LYMPHATIC NEGATIVE: 0

## 2025-06-03 ASSESSMENT — EXTERNAL EXAM - RIGHT EYE: OD_EXAM: NORMAL

## 2025-06-03 ASSESSMENT — SLIT LAMP EXAM - LIDS
COMMENTS: NORMAL
COMMENTS: NORMAL

## 2025-06-03 ASSESSMENT — VISUAL ACUITY
METHOD: SNELLEN - LINEAR
OD_SC: LP

## 2025-06-03 ASSESSMENT — TONOMETRY
IOP_METHOD: GOLDMANN APPLANATION
OD_IOP_MMHG: 10

## 2025-06-03 ASSESSMENT — EXTERNAL EXAM - LEFT EYE: OS_EXAM: NORMAL

## 2025-06-03 NOTE — PROGRESS NOTES
Macula-off rhegmatogenous retinal detachment of right eyeH33.001  S/p PPV/MP/retinectomy/EL/SO exchange OD (Livia, Arielle)  - S/P PPV SOR, IOL laser locking, MP  - POD 1  - Doing well  - IOP WNL  - K edema 4+  - Retina is flat  - No signs of infection    Plan:  - Prednisolone Q1hr and ofloxacin QID  - RTC in 1 week  - Endopthalmitis and RD precaution return precautions discussed, including pain, redness, decreased vision, and flashes/floaters.   - Instructions:  -- No heavy lifting/bending/straining  -- Wear shield while sleeping, glasses during day

## 2025-06-10 ENCOUNTER — APPOINTMENT (OUTPATIENT)
Dept: OPHTHALMOLOGY | Facility: CLINIC | Age: 65
End: 2025-06-10
Payer: MEDICARE

## 2025-06-12 ENCOUNTER — APPOINTMENT (OUTPATIENT)
Dept: OPHTHALMOLOGY | Facility: CLINIC | Age: 65
End: 2025-06-12
Payer: MEDICARE

## 2025-06-12 DIAGNOSIS — H33.21 RIGHT RETINAL DETACHMENT: ICD-10-CM

## 2025-06-12 DIAGNOSIS — H18.20 CORNEAL EDEMA: Primary | ICD-10-CM

## 2025-06-12 PROCEDURE — 76512 OPH US DX B-SCAN: CPT | Mod: RIGHT SIDE

## 2025-06-12 PROCEDURE — 99024 POSTOP FOLLOW-UP VISIT: CPT

## 2025-06-12 RX ORDER — SODIUM CHLORIDE 50 MG/ML
1 SOLUTION/ DROPS OPHTHALMIC 4 TIMES DAILY
Qty: 15 ML | Refills: 3 | Status: SHIPPED | OUTPATIENT
Start: 2025-06-12 | End: 2025-09-10

## 2025-06-12 ASSESSMENT — VISUAL ACUITY
OD_SC: HM
METHOD: SNELLEN - LINEAR

## 2025-06-12 ASSESSMENT — TONOMETRY
IOP_METHOD: GOLDMANN APPLANATION
OD_IOP_MMHG: 4

## 2025-06-12 ASSESSMENT — ENCOUNTER SYMPTOMS
CONSTITUTIONAL NEGATIVE: 0
ENDOCRINE NEGATIVE: 0
EYES NEGATIVE: 1
ALLERGIC/IMMUNOLOGIC NEGATIVE: 0
PSYCHIATRIC NEGATIVE: 0
RESPIRATORY NEGATIVE: 0
MUSCULOSKELETAL NEGATIVE: 0
NEUROLOGICAL NEGATIVE: 0
GASTROINTESTINAL NEGATIVE: 0
CARDIOVASCULAR NEGATIVE: 0
HEMATOLOGIC/LYMPHATIC NEGATIVE: 0

## 2025-06-12 ASSESSMENT — SLIT LAMP EXAM - LIDS
COMMENTS: NORMAL
COMMENTS: NORMAL

## 2025-06-12 ASSESSMENT — EXTERNAL EXAM - RIGHT EYE: OD_EXAM: NORMAL

## 2025-06-12 ASSESSMENT — EXTERNAL EXAM - LEFT EYE: OS_EXAM: NORMAL

## 2025-06-12 NOTE — PROGRESS NOTES
Macula-off rhegmatogenous retinal detachment of right eyeH33.001  S/p PPV/MP/retinectomy/EL/SO exchange OD (Livia, Arielle)  - S/P PPV SOR, IOL laser locking, MP (6/2/25)  - POW#1  - Doing well  - IOP 4, but likely higher due K edema  - Persistent K edema 4+  - Retina is flat  - Bscan (6/12/25) OD: retina is flat, early serous effusion and change in globe contour consistent with hypotony  - No signs of infection    Plan:  Hypotony, K edema  - Increase Prednisolone Q2hr, stop ofloxacin  - Start Magdaleno QID OD  - RTC in 1 week  - Endopthalmitis and RD precaution return precautions discussed, including pain, redness, decreased vision, and flashes/floaters.   - Instructions:  -- No heavy lifting/bending/straining  -- Wear shield while sleeping, glasses during day

## 2025-06-20 ENCOUNTER — APPOINTMENT (OUTPATIENT)
Dept: OPHTHALMOLOGY | Facility: CLINIC | Age: 65
End: 2025-06-20
Payer: MEDICARE

## 2025-06-20 DIAGNOSIS — H18.20 CORNEAL EDEMA: Primary | ICD-10-CM

## 2025-06-20 PROCEDURE — 99024 POSTOP FOLLOW-UP VISIT: CPT

## 2025-06-20 RX ORDER — DIFLUPREDNATE OPHTHALMIC 0.5 MG/ML
1 EMULSION OPHTHALMIC 4 TIMES DAILY
Qty: 5 ML | Refills: 3 | Status: SHIPPED | OUTPATIENT
Start: 2025-06-20 | End: 2025-07-20

## 2025-06-20 ASSESSMENT — ENCOUNTER SYMPTOMS
NEUROLOGICAL NEGATIVE: 0
RESPIRATORY NEGATIVE: 0
MUSCULOSKELETAL NEGATIVE: 0
CONSTITUTIONAL NEGATIVE: 0
ENDOCRINE NEGATIVE: 0
ALLERGIC/IMMUNOLOGIC NEGATIVE: 0
PSYCHIATRIC NEGATIVE: 0
HEMATOLOGIC/LYMPHATIC NEGATIVE: 0
EYES NEGATIVE: 1
CARDIOVASCULAR NEGATIVE: 0
GASTROINTESTINAL NEGATIVE: 0

## 2025-06-20 ASSESSMENT — VISUAL ACUITY
OD_SC: HM
CORRECTION_TYPE: GLASSES
METHOD: SNELLEN - LINEAR

## 2025-06-20 ASSESSMENT — TONOMETRY
OS_IOP_MMHG: DEFER
IOP_METHOD: GOLDMANN APPLANATION
OD_IOP_MMHG: 4

## 2025-06-20 ASSESSMENT — EXTERNAL EXAM - LEFT EYE: OS_EXAM: NORMAL

## 2025-06-20 ASSESSMENT — SLIT LAMP EXAM - LIDS
COMMENTS: NORMAL
COMMENTS: NORMAL

## 2025-06-20 ASSESSMENT — EXTERNAL EXAM - RIGHT EYE: OD_EXAM: NORMAL

## 2025-06-20 NOTE — PROGRESS NOTES
"Macula-off rhegmatogenous retinal detachment of right eyeH33.001  S/p PPV/MP/retinectomy/EL/SO exchange OD (Arielle Bhakta)  - S/P PPV SOR, IOL laser locking, MP (6/2/25)  - POW#2, HM but patient reporting \"more light into the eye  - Doing well  - IOP low but stable at 4, but likely higher due K edema  - Persistent K edema, slowly improving  - Retina is flat  - Bscan (6/12/25) OD: retina is flat, early serous effusion and change in globe contour consistent with hypotony  - No signs of infection    Plan:  Persistent Hypotony, K edema  - Start durezol QID for IOP and edema  - Continue Prednisolone Q2hr,  - Continue Magdaleno QID OD  - RTC in 3-4 weeks  - Endopthalmitis and RD precaution return precautions discussed, including pain, redness, decreased vision, and flashes/floaters.   - Instructions:  -- No heavy lifting/bending/straining  -- Wear shield while sleeping, glasses during day                  "

## 2025-06-30 ENCOUNTER — APPOINTMENT (OUTPATIENT)
Dept: OPHTHALMOLOGY | Facility: CLINIC | Age: 65
End: 2025-06-30
Payer: MEDICARE

## 2025-06-30 DIAGNOSIS — H33.001 MACULA-ON RHEGMATOGENOUS RETINAL DETACHMENT OF RIGHT EYE: ICD-10-CM

## 2025-06-30 DIAGNOSIS — H33.21 RIGHT RETINAL DETACHMENT: Primary | ICD-10-CM

## 2025-06-30 DIAGNOSIS — H35.372 EPIRETINAL MEMBRANE (ERM) OF LEFT EYE: ICD-10-CM

## 2025-06-30 PROCEDURE — 99024 POSTOP FOLLOW-UP VISIT: CPT

## 2025-06-30 ASSESSMENT — ENCOUNTER SYMPTOMS
ENDOCRINE NEGATIVE: 0
RESPIRATORY NEGATIVE: 0
HEMATOLOGIC/LYMPHATIC NEGATIVE: 0
CARDIOVASCULAR NEGATIVE: 0
GASTROINTESTINAL NEGATIVE: 0
NEUROLOGICAL NEGATIVE: 0
PSYCHIATRIC NEGATIVE: 0
MUSCULOSKELETAL NEGATIVE: 0
CONSTITUTIONAL NEGATIVE: 0
EYES NEGATIVE: 1
ALLERGIC/IMMUNOLOGIC NEGATIVE: 0

## 2025-06-30 ASSESSMENT — TONOMETRY
IOP_METHOD: GOLDMANN APPLANATION
OD_IOP_MMHG: 3-4
OS_IOP_MMHG: DEFER

## 2025-06-30 ASSESSMENT — VISUAL ACUITY
OD_SC: HM
METHOD: SNELLEN - LINEAR

## 2025-07-14 ASSESSMENT — EXTERNAL EXAM - LEFT EYE: OS_EXAM: NORMAL

## 2025-07-14 ASSESSMENT — SLIT LAMP EXAM - LIDS
COMMENTS: NORMAL
COMMENTS: NORMAL

## 2025-07-14 ASSESSMENT — EXTERNAL EXAM - RIGHT EYE: OD_EXAM: NORMAL

## 2025-07-14 NOTE — PROGRESS NOTES
"Macula-off rhegmatogenous retinal detachment of right eyeH33.001  S/p PPV/MP/retinectomy/EL/SO exchange OD (Livia, Arielle)  - S/P PPV SOR, IOL laser locking, MP (6/2/25)  - POW#2, HM but patient reporting \"more light into the eye  - Doing well  - IOP low but stable at 4, but likely higher due K edema  - Persistent K edema, slowly improving  - Retina is flat  - Bscan (6/12/25) OD: retina is flat, early serous effusion and change in globe contour consistent with hypotony  - No signs of infection    Plan:  Persistent Hypotony, K edema  - Discussed repeat surgery with SO injection; patient declined any more procedures at this time   - Continue durezol QID for IOP and edema  - Continue Prednisolone Q2hr,  - Continue Magdaleno QID OD  - RTC in 3 weeks                 "

## 2025-07-15 NOTE — H&P
History Of Present Illness  Saskia Koehler is a 64 y.o. female presenting with  presenting with macula-on (fovea splitting) rhegmatogenous retinal detachment - right eye. This has caused visual decline.      Past Medical History  She has a past medical history of Corneal abrasion, Diverticulosis, Eye trauma, and Nephrolithiasis.    Surgical History  She has a past surgical history that includes Back surgery (01/30/2014); Back surgery (Bilateral); Colonoscopy; and Cataract extraction.     Social History  She reports that she has never smoked. She has never been exposed to tobacco smoke. She has never used smokeless tobacco. She reports current alcohol use of about 5.0 standard drinks of alcohol per week. Drug use questions deferred to the physician.     Allergies  Morphine    ROS  Patient denies ocular pain, redness, discharge, decreased vision, double vision, blind spots, flashes, or floaters.     Physical Exam  Alert  and oriented x 3  Regular radial pulses, bilateral  Normal chest rise with respirations    Assessment/Plan   Principal Problem:    Macula-on rhegmatogenous retinal detachment of right eye    Will plan to proceed with Pars Plana Vitrectomy, Endodrainage, Air-fluid Exchange, Endolaser, Gas vs Silicone Oil Tamponade - Right Eye          I spent 10 minutes in the professional and overall care of this patient.      Shukri Corral MD     [Ultrasound] : ultrasound [Left] : left [Elbow] : elbow [Report was reviewed and noted in the chart] : The report was reviewed and noted in the chart [FreeTextEntry1] : olecranon bursitis

## 2025-07-21 ENCOUNTER — APPOINTMENT (OUTPATIENT)
Dept: OPHTHALMOLOGY | Facility: CLINIC | Age: 65
End: 2025-07-21
Payer: MEDICARE

## 2025-07-21 DIAGNOSIS — H33.001 RHEGMATOGENOUS RETINAL DETACHMENT OF RIGHT EYE: ICD-10-CM

## 2025-07-21 DIAGNOSIS — H35.371 EPIRETINAL MEMBRANE (ERM) OF RIGHT EYE: Primary | ICD-10-CM

## 2025-07-21 DIAGNOSIS — H35.372 EPIRETINAL MEMBRANE (ERM) OF LEFT EYE: ICD-10-CM

## 2025-07-21 PROCEDURE — 99024 POSTOP FOLLOW-UP VISIT: CPT

## 2025-07-21 RX ORDER — PREDNISOLONE ACETATE 10 MG/ML
SUSPENSION/ DROPS OPHTHALMIC
COMMUNITY
Start: 2025-06-02

## 2025-07-21 ASSESSMENT — SLIT LAMP EXAM - LIDS
COMMENTS: NORMAL
COMMENTS: NORMAL

## 2025-07-21 ASSESSMENT — ENCOUNTER SYMPTOMS
HEMATOLOGIC/LYMPHATIC NEGATIVE: 0
NEUROLOGICAL NEGATIVE: 0
RESPIRATORY NEGATIVE: 0
CARDIOVASCULAR NEGATIVE: 0
ENDOCRINE NEGATIVE: 0
GASTROINTESTINAL NEGATIVE: 0
CONSTITUTIONAL NEGATIVE: 0
EYES NEGATIVE: 1
ALLERGIC/IMMUNOLOGIC NEGATIVE: 0
MUSCULOSKELETAL NEGATIVE: 0
PSYCHIATRIC NEGATIVE: 0

## 2025-07-21 ASSESSMENT — VISUAL ACUITY
OD_SC: HM
METHOD: SNELLEN - LINEAR

## 2025-07-21 ASSESSMENT — EXTERNAL EXAM - RIGHT EYE: OD_EXAM: NORMAL

## 2025-07-21 ASSESSMENT — EXTERNAL EXAM - LEFT EYE: OS_EXAM: NORMAL

## 2025-07-21 NOTE — PROGRESS NOTES
"Macula-off rhegmatogenous retinal detachment of right eyeH33.001  S/p PPV/MP/retinectomy/EL/SO exchange OD (Livia, Arielle)  - S/P PPV SOR, IOL laser locking, MP (6/2/25)  - POW#2, HM but patient reporting \"more light into the eye  - Doing well  - IOP low but stable at 4, but likely higher due K edema  - Persistent K edema, slowly improving  - Retina is flat  - Bscan (6/12/25) OD: retina is flat, early serous effusion and change in globe contour consistent with hypotony  - No signs of infection    Plan:  Persistent Hypotony, K edema  Recurrent RD today  - Discussed repeat surgery with SO injection; Discussed that visual recovery is unlikely due to the prognosis of multiple recurrence and hypotony. The main goal of surgery is to attempt to decrease the risk of eye shrinking. Patient understands and wishes to proceed with surgery   - Continue durezol QID for IOP and edema  - Continue Prednisolone Q2hr,  - Continue Magdaleno QID OD  - RTC in 3 weeks                 "

## 2025-07-24 ENCOUNTER — HOSPITAL ENCOUNTER (OUTPATIENT)
Facility: CLINIC | Age: 65
Setting detail: OUTPATIENT SURGERY
Discharge: HOME | End: 2025-07-24
Payer: MEDICARE

## 2025-07-24 ENCOUNTER — ANESTHESIA (OUTPATIENT)
Dept: OPERATING ROOM | Facility: CLINIC | Age: 65
End: 2025-07-24
Payer: MEDICARE

## 2025-07-24 ENCOUNTER — ANESTHESIA EVENT (OUTPATIENT)
Dept: OPERATING ROOM | Facility: CLINIC | Age: 65
End: 2025-07-24
Payer: MEDICARE

## 2025-07-24 VITALS
HEART RATE: 52 BPM | TEMPERATURE: 96.8 F | OXYGEN SATURATION: 95 % | HEIGHT: 64 IN | RESPIRATION RATE: 18 BRPM | BODY MASS INDEX: 27.96 KG/M2 | WEIGHT: 163.8 LBS | DIASTOLIC BLOOD PRESSURE: 70 MMHG | SYSTOLIC BLOOD PRESSURE: 118 MMHG

## 2025-07-24 PROCEDURE — 3700000001 HC GENERAL ANESTHESIA TIME - INITIAL BASE CHARGE

## 2025-07-24 PROCEDURE — 2500000005 HC RX 250 GENERAL PHARMACY W/O HCPCS

## 2025-07-24 PROCEDURE — 3700000002 HC GENERAL ANESTHESIA TIME - EACH INCREMENTAL 1 MINUTE

## 2025-07-24 PROCEDURE — 3600000004 HC OR TIME - INITIAL BASE CHARGE - PROCEDURE LEVEL FOUR

## 2025-07-24 PROCEDURE — 7100000010 HC PHASE TWO TIME - EACH INCREMENTAL 1 MINUTE

## 2025-07-24 PROCEDURE — 2500000004 HC RX 250 GENERAL PHARMACY W/ HCPCS (ALT 636 FOR OP/ED)

## 2025-07-24 PROCEDURE — 2720000007 HC OR 272 NO HCPCS

## 2025-07-24 PROCEDURE — A67036 PR VITRECTOMY,MECHANICAL: Performed by: ANESTHESIOLOGY

## 2025-07-24 PROCEDURE — C1814 RETINAL TAMP, SILICONE OIL: HCPCS

## 2025-07-24 PROCEDURE — 3600000009 HC OR TIME - EACH INCREMENTAL 1 MINUTE - PROCEDURE LEVEL FOUR

## 2025-07-24 PROCEDURE — 67108 REPAIR DETACHED RETINA: CPT

## 2025-07-24 PROCEDURE — 2500000001 HC RX 250 WO HCPCS SELF ADMINISTERED DRUGS (ALT 637 FOR MEDICARE OP)

## 2025-07-24 PROCEDURE — 7100000009 HC PHASE TWO TIME - INITIAL BASE CHARGE

## 2025-07-24 PROCEDURE — 2780000003 HC OR 278 NO HCPCS

## 2025-07-24 PROCEDURE — A67036 PR VITRECTOMY,MECHANICAL

## 2025-07-24 DEVICE — SILIKON™ 1000 (PURIFIED POLYDIMETHYLSILOXANE) IS HIGHLY PURIFIED LONG CHAIN POLYDIMETHYLSILOXANE TRIMETHYLSILOXY TERMINATED SILICONE OIL. IT IS STERILE,NON-PYROGENIC, CLEAR, COLORLESS AND HAS A VISCOSITY OF 1000 CS. FOR USE AS A POST-OPERATIVE RETINAL TAMPONADE DURING VITREORETINAL SURGERY. SILIKON™ 1000IS COMPOSED OF SILICON, OXYGEN, CARBON AND HYDROGEN ATOMS. SILIKON™ 1000 IS IMMISCIBLE WITH AQUEOUS COMPONENTS AND IS RELATIVELY INERT MATERIAL WITHLITTLE BIOLOGICAL TOXICITY POTENTIAL.
Type: IMPLANTABLE DEVICE | Site: EYE | Status: FUNCTIONAL
Brand: SILIKON™

## 2025-07-24 RX ORDER — WATER 1000 ML/1000ML
INJECTION, SOLUTION INTRAVENOUS CONTINUOUS PRN
Status: COMPLETED | OUTPATIENT
Start: 2025-07-24 | End: 2025-07-24

## 2025-07-24 RX ORDER — SODIUM CHLORIDE, SODIUM LACTATE, POTASSIUM CHLORIDE, CALCIUM CHLORIDE 600; 310; 30; 20 MG/100ML; MG/100ML; MG/100ML; MG/100ML
50 INJECTION, SOLUTION INTRAVENOUS CONTINUOUS
Status: DISCONTINUED | OUTPATIENT
Start: 2025-07-24 | End: 2025-07-24 | Stop reason: HOSPADM

## 2025-07-24 RX ORDER — BUPIVACAINE HYDROCHLORIDE 7.5 MG/ML
INJECTION, SOLUTION EPIDURAL; RETROBULBAR AS NEEDED
Status: DISCONTINUED | OUTPATIENT
Start: 2025-07-24 | End: 2025-07-24 | Stop reason: HOSPADM

## 2025-07-24 RX ORDER — IBUPROFEN 200 MG
400 TABLET ORAL ONCE AS NEEDED
Status: DISCONTINUED | OUTPATIENT
Start: 2025-07-24 | End: 2025-07-24 | Stop reason: HOSPADM

## 2025-07-24 RX ORDER — MIDAZOLAM HYDROCHLORIDE 1 MG/ML
INJECTION, SOLUTION INTRAMUSCULAR; INTRAVENOUS AS NEEDED
Status: DISCONTINUED | OUTPATIENT
Start: 2025-07-24 | End: 2025-07-24

## 2025-07-24 RX ORDER — SODIUM CHLORIDE, SODIUM LACTATE, POTASSIUM CHLORIDE, CALCIUM CHLORIDE 600; 310; 30; 20 MG/100ML; MG/100ML; MG/100ML; MG/100ML
INJECTION, SOLUTION INTRAVENOUS CONTINUOUS PRN
Status: DISCONTINUED | OUTPATIENT
Start: 2025-07-24 | End: 2025-07-24

## 2025-07-24 RX ORDER — WATER 1 ML/ML
INJECTION IRRIGATION AS NEEDED
Status: DISCONTINUED | OUTPATIENT
Start: 2025-07-24 | End: 2025-07-24 | Stop reason: HOSPADM

## 2025-07-24 RX ORDER — ONDANSETRON HYDROCHLORIDE 2 MG/ML
4 INJECTION, SOLUTION INTRAVENOUS ONCE AS NEEDED
Status: DISCONTINUED | OUTPATIENT
Start: 2025-07-24 | End: 2025-07-24 | Stop reason: HOSPADM

## 2025-07-24 RX ORDER — METOCLOPRAMIDE HYDROCHLORIDE 5 MG/ML
10 INJECTION INTRAMUSCULAR; INTRAVENOUS ONCE AS NEEDED
Status: DISCONTINUED | OUTPATIENT
Start: 2025-07-24 | End: 2025-07-24 | Stop reason: HOSPADM

## 2025-07-24 RX ORDER — CEFAZOLIN 1 G/1
INJECTION, POWDER, FOR SOLUTION INTRAVENOUS AS NEEDED
Status: DISCONTINUED | OUTPATIENT
Start: 2025-07-24 | End: 2025-07-24 | Stop reason: HOSPADM

## 2025-07-24 RX ORDER — TETRACAINE HYDROCHLORIDE 5 MG/ML
SOLUTION OPHTHALMIC AS NEEDED
Status: DISCONTINUED | OUTPATIENT
Start: 2025-07-24 | End: 2025-07-24 | Stop reason: HOSPADM

## 2025-07-24 RX ORDER — TETRACAINE HYDROCHLORIDE 5 MG/ML
1 SOLUTION OPHTHALMIC ONCE
Status: COMPLETED | OUTPATIENT
Start: 2025-07-24 | End: 2025-07-24

## 2025-07-24 RX ORDER — TROPICAMIDE 5 MG/ML
1 SOLUTION/ DROPS OPHTHALMIC ONCE
Status: COMPLETED | OUTPATIENT
Start: 2025-07-24 | End: 2025-07-24

## 2025-07-24 RX ORDER — DEXAMETHASONE SODIUM PHOSPHATE 10 MG/ML
INJECTION INTRAMUSCULAR; INTRAVENOUS AS NEEDED
Status: DISCONTINUED | OUTPATIENT
Start: 2025-07-24 | End: 2025-07-24 | Stop reason: HOSPADM

## 2025-07-24 RX ORDER — LIDOCAINE HYDROCHLORIDE 10 MG/ML
0.1 INJECTION, SOLUTION EPIDURAL; INFILTRATION; INTRACAUDAL; PERINEURAL ONCE
Status: DISCONTINUED | OUTPATIENT
Start: 2025-07-24 | End: 2025-07-24 | Stop reason: HOSPADM

## 2025-07-24 RX ORDER — PROPOFOL 10 MG/ML
INJECTION, EMULSION INTRAVENOUS AS NEEDED
Status: DISCONTINUED | OUTPATIENT
Start: 2025-07-24 | End: 2025-07-24

## 2025-07-24 RX ORDER — ALBUTEROL SULFATE 0.83 MG/ML
2.5 SOLUTION RESPIRATORY (INHALATION) ONCE AS NEEDED
Status: DISCONTINUED | OUTPATIENT
Start: 2025-07-24 | End: 2025-07-24 | Stop reason: HOSPADM

## 2025-07-24 RX ORDER — POVIDONE-IODINE 5 %
SOLUTION, NON-ORAL OPHTHALMIC (EYE) AS NEEDED
Status: DISCONTINUED | OUTPATIENT
Start: 2025-07-24 | End: 2025-07-24 | Stop reason: HOSPADM

## 2025-07-24 RX ORDER — ACETAMINOPHEN 325 MG/1
TABLET ORAL AS NEEDED
Status: DISCONTINUED | OUTPATIENT
Start: 2025-07-24 | End: 2025-07-24

## 2025-07-24 RX ORDER — FENTANYL CITRATE 50 UG/ML
INJECTION, SOLUTION INTRAMUSCULAR; INTRAVENOUS AS NEEDED
Status: DISCONTINUED | OUTPATIENT
Start: 2025-07-24 | End: 2025-07-24

## 2025-07-24 RX ORDER — PHENYLEPHRINE HYDROCHLORIDE 100 MG/ML
1 SOLUTION/ DROPS OPHTHALMIC
Status: COMPLETED | OUTPATIENT
Start: 2025-07-24 | End: 2025-07-24

## 2025-07-24 RX ORDER — ONDANSETRON HYDROCHLORIDE 2 MG/ML
INJECTION, SOLUTION INTRAVENOUS AS NEEDED
Status: DISCONTINUED | OUTPATIENT
Start: 2025-07-24 | End: 2025-07-24

## 2025-07-24 RX ORDER — TROPICAMIDE 5 MG/ML
1 SOLUTION/ DROPS OPHTHALMIC 3 TIMES DAILY
Status: DISCONTINUED | OUTPATIENT
Start: 2025-07-24 | End: 2025-07-24

## 2025-07-24 RX ORDER — TRIAMCINOLONE ACETONIDE 40 MG/ML
INJECTION, SUSPENSION INTRA-ARTICULAR; INTRAMUSCULAR AS NEEDED
Status: DISCONTINUED | OUTPATIENT
Start: 2025-07-24 | End: 2025-07-24 | Stop reason: HOSPADM

## 2025-07-24 RX ADMIN — FENTANYL CITRATE 25 MCG: 50 INJECTION, SOLUTION INTRAMUSCULAR; INTRAVENOUS at 12:17

## 2025-07-24 RX ADMIN — FENTANYL CITRATE 25 MCG: 50 INJECTION, SOLUTION INTRAMUSCULAR; INTRAVENOUS at 12:42

## 2025-07-24 RX ADMIN — FENTANYL CITRATE 25 MCG: 50 INJECTION, SOLUTION INTRAMUSCULAR; INTRAVENOUS at 11:58

## 2025-07-24 RX ADMIN — PHENYLEPHRINE HYDROCHLORIDE 1 DROP: 100 SOLUTION/ DROPS OPHTHALMIC at 11:43

## 2025-07-24 RX ADMIN — TETRACAINE HYDROCHLORIDE 1 DROP: 5 SOLUTION/ DROPS OPHTHALMIC at 11:44

## 2025-07-24 RX ADMIN — ACETAMINOPHEN 650 MG: 325 TABLET, FILM COATED ORAL at 11:08

## 2025-07-24 RX ADMIN — ONDANSETRON 4 MG: 2 INJECTION INTRAMUSCULAR; INTRAVENOUS at 11:58

## 2025-07-24 RX ADMIN — PHENYLEPHRINE HYDROCHLORIDE 1 DROP: 100 SOLUTION/ DROPS OPHTHALMIC at 11:46

## 2025-07-24 RX ADMIN — PROPOFOL 40 MG: 10 INJECTION, EMULSION INTRAVENOUS at 11:59

## 2025-07-24 RX ADMIN — PHENYLEPHRINE HYDROCHLORIDE 1 DROP: 100 SOLUTION/ DROPS OPHTHALMIC at 11:49

## 2025-07-24 RX ADMIN — MIDAZOLAM 2 MG: 1 INJECTION INTRAMUSCULAR; INTRAVENOUS at 11:53

## 2025-07-24 RX ADMIN — SODIUM CHLORIDE, POTASSIUM CHLORIDE, SODIUM LACTATE AND CALCIUM CHLORIDE: 600; 310; 30; 20 INJECTION, SOLUTION INTRAVENOUS at 11:53

## 2025-07-24 RX ADMIN — TROPICAMIDE 1 DROP: 5 SOLUTION/ DROPS OPHTHALMIC at 11:45

## 2025-07-24 RX ADMIN — FENTANYL CITRATE 25 MCG: 50 INJECTION, SOLUTION INTRAMUSCULAR; INTRAVENOUS at 12:58

## 2025-07-24 SDOH — HEALTH STABILITY: MENTAL HEALTH: CURRENT SMOKER: 0

## 2025-07-24 ASSESSMENT — PAIN - FUNCTIONAL ASSESSMENT
PAIN_FUNCTIONAL_ASSESSMENT: 0-10

## 2025-07-24 ASSESSMENT — ENCOUNTER SYMPTOMS
CARDIOVASCULAR NEGATIVE: 1
GASTROINTESTINAL NEGATIVE: 1
ENDOCRINE NEGATIVE: 1
RESPIRATORY NEGATIVE: 1
CONSTITUTIONAL NEGATIVE: 1

## 2025-07-24 ASSESSMENT — PAIN SCALES - GENERAL
PAINLEVEL_OUTOF10: 0 - NO PAIN

## 2025-07-24 NOTE — H&P
History Of Present Illness  Saskia Koehler is a 65 y.o. female presenting with Persistent Hypotony, K edema, Recurrent RD right eye.     Past Medical History  Medical History[1]    Surgical History  Surgical History[2]     Social History  She reports that she has never smoked. She has never been exposed to tobacco smoke. She has never used smokeless tobacco. She reports that she does not currently use alcohol after a past usage of about 7.0 standard drinks of alcohol per week. She reports that she does not currently use drugs.    Family History  Family History[3]     Allergies  Morphine, Other, and Hay fever and allergy relief    Review of Systems   Constitutional: Negative.    HENT: Negative.     Eyes:  Positive for visual disturbance.   Respiratory: Negative.     Cardiovascular: Negative.    Gastrointestinal: Negative.    Endocrine: Negative.    Genitourinary: Negative.    All other systems reviewed and are negative.       Physical Exam  Constitutional:       Appearance: Normal appearance.     Eyes:      Conjunctiva/sclera: Conjunctivae normal.       Cardiovascular:      Rate and Rhythm: Normal rate and regular rhythm.      Pulses: Normal pulses.   Pulmonary:      Effort: Pulmonary effort is normal.     Musculoskeletal:         General: Normal range of motion.      Cervical back: Normal range of motion.     Skin:     Capillary Refill: Capillary refill takes less than 2 seconds.     Neurological:      General: No focal deficit present.      Mental Status: She is alert.     Psychiatric:         Mood and Affect: Mood normal.          Last Recorded Vitals  There were no vitals taken for this visit.    Relevant Results       Assessment & Plan  Rhegmatogenous retinal detachment of right eye      Plan for - pars plana vitrectomy (PPV) with SO injection    Jacqui Palomares MD         [1]   Past Medical History:  Diagnosis Date    Adverse effect of anesthesia     General anesthesia caused extreme lethary for days    Corneal  abrasion     Diverticulosis     Eye trauma     Injury 4/2024    Glaucoma     Nephrolithiasis     Retinal detachment     Rhegmatogenous retinal detachment of right eye     Vitreous prolapse of right eye    [2]   Past Surgical History:  Procedure Laterality Date    BACK SURGERY  01/30/2014    Back Surgery    BACK SURGERY Bilateral     CATARACT EXTRACTION      COLONOSCOPY      EYE SURGERY Right 09/13/2024    vitrectomy   [3]   Family History  Problem Relation Name Age of Onset    No Known Problems Mother

## 2025-07-24 NOTE — ANESTHESIA POSTPROCEDURE EVALUATION
Patient: Saskia Koehler    Procedure Summary       Date: 07/24/25 Room / Location: [facility] OR 03 / [facility] [place] [facility]    Anesthesia Start: 1153 Anesthesia Stop: 1322    Procedures:       INJECTION, SILICONE OIL, EYE (Right)      VITRECTOMY, PARS PLANA APPROACH, USING 23-GAUGE INSTRUMENTS (Right) Diagnosis:       Rhegmatogenous retinal detachment of right eye      (Rhegmatogenous retinal detachment of right eye [H33.001])    Surgeons: Umer Hidalgo MD PhD Responsible Provider: Terrance Phillips MD    Anesthesia Type: MAC ASA Status: 2            Anesthesia Type: MAC    Vitals Value Taken Time   /62 07/24/25 13:34   Temp 36 °C (96.8 °F) 07/24/25 13:22   Pulse 58 07/24/25 13:34   Resp 18 07/24/25 13:34   SpO2 95 % 07/24/25 13:34       Anesthesia Post Evaluation    Patient location during evaluation: PACU  Patient participation: complete - patient participated  Level of consciousness: awake  Pain management: satisfactory to patient  Airway patency: patent  Cardiovascular status: acceptable  Respiratory status: acceptable  Hydration status: acceptable  Postoperative Nausea and Vomiting: none  Comments: Did well        No notable events documented.

## 2025-07-24 NOTE — BRIEF OP NOTE
Date: 2025  OR Location: [facility] [place] [facility]    Name: Saskia Koehler, : 1960, Age: 65 y.o., MRN: [id number], Sex: female    Diagnosis  Pre-op Diagnosis      * Rhegmatogenous retinal detachment of right eye [H33.001] Post-op Diagnosis     * Rhegmatogenous retinal detachment of right eye [H33.001]     Procedures  INJECTION, SILICONE OIL, EYE  74813 - CT INJ SUBSTITUTE PARS PLANA/LIMBL W/WO ASPIR SPX    VITRECTOMY, PARS PLANA APPROACH, USING 23-GAUGE INSTRUMENTS  56083 - CT VITRECTOMY MECHANICAL PARS PLANA      Surgeons      * Umer Hidalgo - Primary    Resident/Fellow/Other Assistant:  Surgeons and Role:  * No surgeons found with a matching role *    Staff:   Circulator: Rekha Cat Person: Edith Cat Person: Keri Prado Circulator: Pauline    Anesthesia Staff: Anesthesiologist: Terrance Phillips MD  C-AA: INOCENCIO Urrutia    Procedure Summary  Anesthesia: Monitor Anesthesia Care  ASA: II  Estimated Blood Loss: 2mL  Intra-op Medications:   Administrations occurring from 1123 to 1338 on 25:   Medication Name Total Dose   ceFAZolin (Ancef) injection 0.5 mg   balanced salts (BSS) intraocular solution 50 mL   balanced salts (BSS Plus) intraocular solution 8 mL   tetracaine (PF) 0.5 % ophthalmic solution 2 drop   sterile water irrigation solution 500 mL   dexAMETHasone (Decadron) injection 8 mg   povidone-iodine 5 % ophthalmic solution 1 Application   sterile water injection 10 mL   triamcinolone acetonide (Kenalog-40) injection 40 mg   chondroitin sulf-sod hyaluron (Viscoat) intraocular injection 1 mL   lidocaine PF (Xylocaine) 20 mg/mL (2 %) 5 mL, bupivacaine PF 0.75 % (Marcaine) 5 mL syringe 10 mL   phenylephrine (Winston-Synephrine) 10 % ophthalmic solution 1 drop 3 drop   tetracaine (Altacaine) 0.5 % ophthalmic solution 1 drop 1 drop   tropicamide (Mydriacyl) 0.5 % ophthalmic solution 1 drop 1 drop   fentaNYL (Sublimaze) injection 50 mcg/mL 100 mcg   LR infusion Cannot be calculated   midazolam  (Versed) injection 1 mg/mL 2 mg   ondansetron (Zofran) 2 mg/mL injection 4 mg   propofol (Diprivan) injection 10 mg/mL 40 mg              Anesthesia Record               Intraprocedure I/O Totals       None           Specimen: No specimens collected               Findings: retinal detachment right eye, Silicone oil injected     Complications:  None; patient tolerated the procedure well.     Disposition: PACU - hemodynamically stable.  Condition: stable  Specimens Collected: No specimens collected  Attending Attestation: I performed the procedure.    Umer Hidalgo  Phone Number: [phone]

## 2025-07-24 NOTE — ANESTHESIA PREPROCEDURE EVALUATION
Patient: Saskia Koehler    Procedure Information       Date/Time: 07/24/25 1123    Procedures:       INJECTION, SILICONE OIL, EYE (Right)      VITRECTOMY, PARS PLANA APPROACH, USING 23-GAUGE INSTRUMENTS (Right)    Location: [facility] OR 03 / [facility] OR    Surgeons: Umer Hidalgo MD PhD            Relevant Problems   Anesthesia   (+) Delayed emergence from anesthesia   (+) PONV (postoperative nausea and vomiting)      Cardiac (within normal limits)      Pulmonary (within normal limits)      Neuro (within normal limits)      GI (within normal limits)      /Renal (within normal limits)      Liver (within normal limits)      Endocrine (within normal limits)      Hematology (within normal limits)      Musculoskeletal (within normal limits)       Clinical information reviewed:   Tobacco  Allergies  Meds  Problems  Med Hx  Surg Hx   Fam Hx  Soc   Hx        NPO Detail:  NPO/Void Status  Date of Last Liquid: 07/23/25  Time of Last Liquid: 1900  Date of Last Solid: 07/23/25  Time of Last Solid: 1900  Last Intake Type: Clear fluids; Food  Time of Last Void: 1019         Physical Exam    Airway  Mallampati: II  TM distance: >3 FB  Neck ROM: full  Mouth opening: 3 or more finger widths     Cardiovascular - normal exam  Rhythm: regular  Rate: normal     Dental - normal exam     Pulmonary - normal examBreath sounds clear to auscultation     Abdominal - normal exam           Anesthesia Plan    History of general anesthesia?: yes  History of complications of general anesthesia?: no    ASA 2     MAC     The patient is not a current smoker.    intravenous induction   Anesthetic plan and risks discussed with patient.  Use of blood products discussed with patient who.    Plan discussed with CAA and attending.      Vitals:    07/24/25 1026   BP: 118/67   Pulse: 56   Resp: 16   Temp: 36.1 °C (97 °F)   SpO2: 95%       Surgical History[1]  Medical History[2]  Current Medications[3]  Prior to Admission medications  "  Medication Sig Start Date End Date Taking? Authorizing Provider   prednisoLONE acetate (Pred-Forte) 1 % ophthalmic suspension INSTILL 1 DROP INTO THE RIGHT EYE 4 TIMES A DAY. 6/2/25  Yes Historical Provider, MD   difluprednate (Durezol) 0.05 % ophthalmic solution Administer 1 drop into the right eye 4 times a day. 6/20/25 7/20/25  Shukri Corral MD   Lumigan 0.01 % ophthalmic solution Administer 1 drop into affected eye(s) once daily at bedtime. 3/31/25 3/31/26  Fabrizio Bhakta MD   meloxicam (Mobic) 7.5 mg tablet Take 1 tablet (7.5 mg) by mouth once daily. 5/12/25 5/12/26  Brittany Behm, DO   sodium chloride (Magdaleno 128) 5 % ophthalmic solution Administer 1 drop into the right eye 4 times a day. 6/12/25 9/10/25  Shukri Corral MD     RX Allergies[4]  Social History     Tobacco Use    Smoking status: Never     Passive exposure: Never    Smokeless tobacco: Never    Tobacco comments:     General anesthesia in the past caused extreme lethargy for days. No family history of MH.     No illnesses in the past 30 days.     Does not get SOB with a flight of stairs.     Does not use a cane, walker, or wheelchair.     Is able to lie flat for 30 minutes.         Substance Use Topics    Alcohol use: Not Currently     Alcohol/week: 7.0 standard drinks of alcohol     Types: 7 Glasses of wine per week     Comment: 4 to 7 wine per week         Chemistry    Lab Results   Component Value Date/Time     09/06/2024 0734    K 4.7 09/06/2024 0734     09/06/2024 0734    CO2 32 09/06/2024 0734    BUN 15 09/06/2024 0734    CREATININE 0.78 09/06/2024 0734    Lab Results   Component Value Date/Time    CALCIUM 9.7 09/06/2024 0734    ALKPHOS 60 09/06/2024 0734    AST 16 09/06/2024 0734    ALT 15 09/06/2024 0734    BILITOT 0.9 09/06/2024 0734          Lab Results   Component Value Date/Time    WBC 5.1 09/06/2024 0734    HGB 13.5 09/06/2024 0734    HCT 41.2 09/06/2024 0734     09/06/2024 0734     No results found for: \"PROTIME\", " "\"PTT\", \"INR\"  No results found for this or any previous visit (from the past 4464 hours).  No results found for this or any previous visit from the past 1095 days.          [1]   Past Surgical History:  Procedure Laterality Date    BACK SURGERY  01/30/2014    Back Surgery    BACK SURGERY Bilateral     CATARACT EXTRACTION      COLONOSCOPY      EYE SURGERY Right 09/13/2024    vitrectomy   [2]   Past Medical History:  Diagnosis Date    Adverse effect of anesthesia     General anesthesia caused extreme lethary for days    Corneal abrasion     Diverticulosis     Eye trauma     Injury 4/2024    Glaucoma     Nephrolithiasis     Retinal detachment     Rhegmatogenous retinal detachment of right eye     Vitreous prolapse of right eye    [3] No current facility-administered medications for this encounter.    Facility-Administered Medications Ordered in Other Encounters:     acetaminophen (Tylenol) tablet, , oral, PRN, Lub[place]r Angie, CAA, 650 mg at 07/24/25 1108  [4]   Allergies  Allergen Reactions    Morphine Unknown    Other Other     Eye gas causes increase pressure    Hay Fever And Allergy Relief Agitation     Sneezing and congestion     "

## 2025-07-24 NOTE — DISCHARGE INSTRUCTIONS
No bending, lifting, heavy straining. No water directly into the eye. Showers okay with washcloth to the rest of the face, avoiding the operative eye completely. Keep patch and shield on until follow up appointment. Keep head in down position and sleep on left side with head to left.     Appointment at 8:30 am [facility] with Dr. Hidalgo.      May have Tylenol after: 5:10 pm    TO REACH YOUR PHYSICIAN AFTER HOURS CALL  AND ASK FOR THE PHYSICIAN ON CALL

## 2025-07-25 ENCOUNTER — OFFICE VISIT (OUTPATIENT)
Dept: OPHTHALMOLOGY | Facility: CLINIC | Age: 65
End: 2025-07-25
Payer: MEDICARE

## 2025-07-25 DIAGNOSIS — H33.41 RETINAL DETACHMENT, TRACTIONAL, RIGHT: Primary | ICD-10-CM

## 2025-07-25 PROCEDURE — 99024 POSTOP FOLLOW-UP VISIT: CPT

## 2025-07-25 ASSESSMENT — VISUAL ACUITY
OD_SC: HM
METHOD: SNELLEN - LINEAR

## 2025-07-25 ASSESSMENT — EXTERNAL EXAM - RIGHT EYE: OD_EXAM: NORMAL

## 2025-07-25 ASSESSMENT — TONOMETRY
IOP_METHOD: GOLDMANN APPLANATION
OD_IOP_MMHG: 15

## 2025-07-25 ASSESSMENT — EXTERNAL EXAM - LEFT EYE: OS_EXAM: NORMAL

## 2025-07-25 ASSESSMENT — SLIT LAMP EXAM - LIDS
COMMENTS: NORMAL
COMMENTS: NORMAL

## 2025-07-25 NOTE — OP NOTE
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]     Procedures  INJECTION, SILICONE OIL, EYE  31552 - [facility] PARS PLANA/LIMBL W/WO ASPIR SPX    VITRECTOMY, PARS PLANA APPROACH, USING 23-GAUGE INSTRUMENTS  48306 - [place] VITRECTOMY MECHANICAL PARS PLANA      Surgeons      * Umer Hidalgo - Primary    Resident/Fellow/Other Assistant:  Surgeons and Role:  * No surgeons found with a matching role *    Staff:   Circulator: Rekha Leachub Person: Edith Cat Person: Keri Prado Circulator: Pauline    Anesthesia Staff: Anesthesiologist: Terrance Phillips MD  C-AA: INOCENCIO Urrutia    Procedure Summary  Anesthesia: Monitor Anesthesia Care  ASA: II  Estimated Blood Loss: ***mL  Intra-op Medications:   Administrations occurring from 1123 to 1338 on 25:   Medication Name Total Dose   ceFAZolin (Ancef) injection 0.5 mg   balanced salts (BSS) intraocular solution 50 mL   balanced salts (BSS Plus) intraocular solution 8 mL   tetracaine (PF) 0.5 % ophthalmic solution 2 drop   sterile water irrigation solution 500 mL   dexAMETHasone (Decadron) injection 8 mg   povidone-iodine 5 % ophthalmic solution 1 Application   sterile water injection 10 mL   triamcinolone acetonide (Kenalog-40) injection 40 mg   chondroitin sulf-sod hyaluron (Viscoat) intraocular injection 1 mL   lidocaine PF (Xylocaine) 20 mg/mL (2 %) 5 mL, bupivacaine PF 0.75 % (Marcaine) 5 mL syringe 10 mL   phenylephrine (Winston-Synephrine) 10 % ophthalmic solution 1 drop 3 drop   tetracaine (Altacaine) 0.5 % ophthalmic solution 1 drop 1 drop   tropicamide (Mydriacyl) 0.5 % ophthalmic  "solution 1 drop 1 drop   fentaNYL (Sublimaze) injection 50 mcg/mL 100 mcg   LR infusion Cannot be calculated   midazolam (Versed) injection 1 mg/mL 2 mg   ondansetron (Zofran) 2 mg/mL injection 4 mg   propofol (Diprivan) injection 10 mg/mL 40 mg              Anesthesia Record               Intraprocedure I/O Totals          Intake    LR infusion 400.00 mL    Total Intake 400 mL          Specimen: No specimens collected              Drains and/or Catheters: * None in log *    Tourniquet Times:         Implants:  Implants       Type Name Action Serial No.      Implant MED,SILICONE OIL 1000 OPHTHALMIC - [id number] Implanted               Findings: ***    Indications: Saskia Koehler is an 65 y.o. 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 ***    The patient was seen in the preoperative area. The risks, benefits, complications, treatment options, non-operative alternatives, expected recovery and outcomes were discussed with the patient. The possibilities of reaction to medication, pulmonary aspiration, injury to surrounding structures, bleeding, recurrent infection, the need for additional procedures, failure to diagnose a condition, and creating a complication requiring transfusion or operation were discussed with the patient. The patient concurred with the proposed plan, giving informed consent.  The site of surgery was properly noted/marked if necessary per policy. The patient has been actively warmed in preoperative area. Preoperative antibiotics {OR OPERATIVE ANTIBIOTICS:4144539911} Venous thrombosis prophylaxis {OR OPERATIVE PROPHYLAXIS:1352771272}    Procedure Details: ***  Evidence of Infection: {OR EVIDENCE OF INFECTION:46614::\"No \"}  Complications:  {findings; complications:04283::\"None; patient tolerated the procedure well.\"}    Disposition: {Op note disposition:02112}  Condition: {stable/unstable:55143::\"stable\"}                 Additional Details: " ***    Attending Attestation: {Op note attestation (Optional):88781}    Umer Hidalgo  Phone Number: [phone]       was performed using the light pipe, the cutter, and the BIOM viewing system.  The retina was inspected and was found to be detached. Silicone oil was removed from the vitreous cavity using the viscous fluid extraction. Residual silicone oil bubbles were removed with aspiration.  A core vitreous dissection was performed, the patient had prior vitrectomy. Any residual peripheral vitreous was removed. Endodiathermy is applied to induce a retinotomy, a max  forceps was then used to remove a subretinal band. The endolaser probe was brought onto the field and was used to apply laser to reinforce and demarcate all areas of prior retinopexy and around the retinotomy. New retinal defects were noted on scleral depression and these were demarcated with laser.. A complete fluid-air exchange was performed using a soft tip cannula. Residual SO bubbles were removed and any residual fluid was removed from the posterior pole over the optic nerve. New Silicone oil was reinjected into the vitreous cavity through the superotemporal trocar while venting from the superonasal trocar. FAX pressure was titrated down until the silicone was filling the vitreous cavity.     The superonasal and superotemporal trocars were removed and were sutured with an interrupted 7-0 Vicryl. The infusion cannula and associated trocar were then removed and sutured with an interrupted 7-0 Vicryl. The eye was confirmed to be at a physiologic level by digital palpation after removal of the trocars.     Subconjunctival injection of Cefazolin and Dexamethasone were administered. The lid speculum and drapes were removed. Antibiotic ointment was applied. The eye was patched and shielded. The patient tolerated the procedure well without any intraoperative or immediate postoperative complications. The patient was taken to the recovery room in good condition. The patient will follow up with Umer Hidalgo MD PhD in clinic on the following morning.       Evidence  of Infection: No   Complications:  None; patient tolerated the procedure well.    Disposition: PACU - hemodynamically stable.  Condition: stable                 Additional Details: none    Attending Attestation: I was present and scrubbed for the entire procedure.    Umer Hidalgo  Phone Number: [phone]

## 2025-07-25 NOTE — PROGRESS NOTES
"Macula-off rhegmatogenous retinal detachment of right eyeH33.001  S/p PPV/MP/retinectomy/EL/SO exchange OD (Livia, Arielle)  - S/P PPV SOR, IOL laser locking, MP (6/2/25)  - POW#2, HM but patient reporting \"more light into the eye  -- S/P Recurrent RD repair and SO insertion  - POD 1  - Doing well  - 3+ K edema  - IOP 15  - Retina is flat  - No signs of infection    Plan:  - Prednisolone Q2hr and ofloxacin QID  - RTC in 1 week  - Endopthalmitis and RD precaution return precautions discussed, including pain, redness, decreased vision, and flashes/floaters.   - Instructions:  -- No heavy lifting/bending/straining  -- Wear shield while sleeping, glasses during day   - Persistent K edema, slowly improving                   "

## 2025-08-04 ENCOUNTER — APPOINTMENT (OUTPATIENT)
Dept: OPHTHALMOLOGY | Facility: CLINIC | Age: 65
End: 2025-08-04
Payer: MEDICARE

## 2025-08-04 DIAGNOSIS — H33.001 MACULA-ON RHEGMATOGENOUS RETINAL DETACHMENT OF RIGHT EYE: Primary | ICD-10-CM

## 2025-08-04 PROCEDURE — 99024 POSTOP FOLLOW-UP VISIT: CPT

## 2025-08-04 ASSESSMENT — TONOMETRY
OD_IOP_MMHG: 10
IOP_METHOD: GOLDMANN APPLANATION

## 2025-08-04 ASSESSMENT — VISUAL ACUITY
METHOD: SNELLEN - LINEAR
OD_SC: LP

## 2025-08-04 ASSESSMENT — SLIT LAMP EXAM - LIDS
COMMENTS: NORMAL
COMMENTS: NORMAL

## 2025-08-04 ASSESSMENT — EXTERNAL EXAM - LEFT EYE: OS_EXAM: NORMAL

## 2025-08-04 ASSESSMENT — EXTERNAL EXAM - RIGHT EYE: OD_EXAM: NORMAL

## 2025-08-04 NOTE — PROGRESS NOTES
"Macula-off rhegmatogenous retinal detachment of right eyeH33.001  S/p PPV/MP/retinectomy/EL/SO exchange OD (Arielle Bhakta)  - S/P PPV SOR, IOL laser locking, MP (6/2/25)  - POW#2, HM but patient reporting \"more light into the eye  - S/P Recurrent RD repair and SO insertion  - POD 10  - Doing well  - 3+ K edema  - IOP 10  - Retina is flat  - No signs of infection    Plan:  - Continue Prednisolone QID  - RTC in 3 weeks  - Endopthalmitis and RD precaution return precautions discussed, including pain, redness, decreased vision, and flashes/floaters.   - Instructions:  -- No heavy lifting/bending/straining  -- Wear shield while sleeping, glasses during day   - Persistent K edema, slowly improving                   "

## 2025-08-05 RX ORDER — DIFLUPREDNATE OPHTHALMIC 0.5 MG/ML
1 EMULSION OPHTHALMIC 4 TIMES DAILY
Qty: 5 ML | Refills: 2 | Status: SHIPPED | OUTPATIENT
Start: 2025-08-05

## 2025-08-27 ENCOUNTER — APPOINTMENT (OUTPATIENT)
Dept: OPHTHALMOLOGY | Facility: CLINIC | Age: 65
End: 2025-08-27
Payer: MEDICARE

## 2025-08-27 DIAGNOSIS — H33.001 MACULA-ON RHEGMATOGENOUS RETINAL DETACHMENT OF RIGHT EYE: Primary | ICD-10-CM

## 2025-08-27 PROCEDURE — 99024 POSTOP FOLLOW-UP VISIT: CPT

## 2025-08-27 ASSESSMENT — EXTERNAL EXAM - RIGHT EYE: OD_EXAM: NORMAL

## 2025-08-27 ASSESSMENT — ENCOUNTER SYMPTOMS
CONSTITUTIONAL NEGATIVE: 0
CARDIOVASCULAR NEGATIVE: 0
GASTROINTESTINAL NEGATIVE: 0
RESPIRATORY NEGATIVE: 0
EYES NEGATIVE: 0
ALLERGIC/IMMUNOLOGIC NEGATIVE: 0
NEUROLOGICAL NEGATIVE: 0
MUSCULOSKELETAL NEGATIVE: 0
PSYCHIATRIC NEGATIVE: 0
ENDOCRINE NEGATIVE: 0
HEMATOLOGIC/LYMPHATIC NEGATIVE: 0

## 2025-08-27 ASSESSMENT — VISUAL ACUITY
OD_SC: LP
METHOD: SNELLEN - LINEAR

## 2025-08-27 ASSESSMENT — SLIT LAMP EXAM - LIDS
COMMENTS: NORMAL
COMMENTS: NORMAL

## 2025-08-27 ASSESSMENT — EXTERNAL EXAM - LEFT EYE: OS_EXAM: NORMAL

## 2025-09-19 ENCOUNTER — APPOINTMENT (OUTPATIENT)
Dept: OPHTHALMOLOGY | Facility: CLINIC | Age: 65
End: 2025-09-19
Payer: MEDICARE

## (undated) DEVICE — SYRINGE, HYPODERMIC, LUER LOCK, 6 CC

## (undated) DEVICE — PROBE, 25G ILLUMINATED FLEX CURVED LASER 2X W/RFID

## (undated) DEVICE — DRAPE, SURGICAL, STERI DRAPE, INCISE, W/POUCH, MED, LF

## (undated) DEVICE — KIT, CONSTELLATION: 25G VITRECTOMY

## (undated) DEVICE — Device

## (undated) DEVICE — KNIFE, OPHTHALMIC, FULL HANDLE, 15 DEG

## (undated) DEVICE — MARKER, SKIN, RULER AND LABEL PACK, CUSTOM

## (undated) DEVICE — BLADE, MVR, 20 GA

## (undated) DEVICE — SYRINGE, 1 CC, LUER LOCK

## (undated) DEVICE — SUTURE, VICRYL, 7-0, 18 IN, TG1608, DA, VIOLET

## (undated) DEVICE — COVER HANDLE LIGHT, STERIS, BLUE, STERILE

## (undated) DEVICE — PROBE, DIATHERMY 25G

## (undated) DEVICE — BLADE, OPHTHALMIC, SCLEROTOME, MULTI-SIDED, SHARP ALL AROUND

## (undated) DEVICE — NEEDLE, HYPODERMIC, REGULAR WALL, REGULAR BEVEL, 30 G X 0.5 IN

## (undated) DEVICE — NEEDLE, HYPODERMIC, REGULAR WALL, REGULAR BEVEL, 20 G X 1 IN

## (undated) DEVICE — NEEDLE, RETROBULBAR, 23G X 8MM

## (undated) DEVICE — NEEDLE, SOFT TIP, 25G, 0.8MM

## (undated) DEVICE — BLADE, OPHTHALMIC, MINI, STRAIGHT, DOUBLE BEVEL, SHARP ALL AROUND

## (undated) DEVICE — TOWEL, SURGICAL, NEURO, O/R, 16 X 26, BLUE, STERILE

## (undated) DEVICE — GOWN, ASTOUND, L

## (undated) DEVICE — BIOM, OPTIC, HD PROFESSIONAL F/F=200MM

## (undated) DEVICE — LENS, FLAT VITRECTOMY, DISPOSABLE

## (undated) DEVICE — GLOVE, SURGICAL, PROTEXIS PI , 7.5, PF, LF

## (undated) DEVICE — ERASER, HEMOSTATIC, WET-FIELD, BIPOLAR, 18 G

## (undated) DEVICE — SHIELD, EYE, FOX, CONVEX, ALUMINUM, NS

## (undated) DEVICE — COVER, CART, 45 X 27 X 48 IN, CLEAR

## (undated) DEVICE — SYRINGE, 10 CC, LUER LOCK

## (undated) DEVICE — 25+GA HYPERVIT BEVEL 20K CPM WIDE TOTAL PLUS VITRECTOMY PAK

## (undated) DEVICE — SPEAR, EYE, SURGICAL, WECK-CEL, CELLULOSE

## (undated) DEVICE — LABELS, OR GENERAL, W/MARKER

## (undated) DEVICE — GLOVE, SURGICAL, PROTEXIS PI , 7.0, PF, LF

## (undated) DEVICE — REST, HEAD, BAGEL, 9 IN

## (undated) DEVICE — CONSTELLATION, VFC

## (undated) DEVICE — SUTURE, SILK, 2-0, 18IN, BR, FS, BLACK

## (undated) DEVICE — NEEDLE, ARTERIAL/VENOUS, BLUNT FILL, 18 G X 1.5 IN

## (undated) DEVICE — NEEDLE, HYPODERMIC, REGULAR WALL, REGULAR BEVEL, 18 G X 1.5 IN

## (undated) DEVICE — INSTRUMENT, GRIESHABER REVOLUTION, 25 GA, ILM FORCEP

## (undated) DEVICE — SYRINGE, MONOJECT, LUER LOCK, 3 CC, LF

## (undated) DEVICE — BLADE, OPHTHALMIC, CRESCENT, POCKET, ANG 55 DEG, 2.5 MM, MATTE

## (undated) DEVICE — DRESSING, TRANSPARENT, TEGADERM, 4 X 4-3/4 IN

## (undated) DEVICE — MAYO TRAY, SMALL

## (undated) DEVICE — FORCEPS, 25G PLUS, MAXGRIP REVOLUTION

## (undated) DEVICE — KNIFE, SIDEPORT, DUAL BEVEL, ANGLED, 1.0MM

## (undated) DEVICE — KNIFE, OPHTHALMIC, CRESCENT, STRAIGHT, SATIN

## (undated) DEVICE — KIT, CONSTELLATION, 25G VITRECTOMY

## (undated) DEVICE — COVER, MAYO STAND, W/PAD, 23 IN, DISPOSABLE, PLASTIC, LF, STERILE

## (undated) DEVICE — SUTURE, VICRYL, CTD, 7-0, TG1408, VIOLET

## (undated) DEVICE — KNIFE, CLEARCUT SLIT, SIINGLE BEVEL, 2.4MM, ANG

## (undated) DEVICE — NEEDLE, HYPODERMIC, REGULAR WALL, REGULAR BEVEL, 27 G X 0.5 IN

## (undated) DEVICE — AUTO GAS FULL, CONSTELLATION

## (undated) DEVICE — SYRINGE, 5 CC, LUER LOCK

## (undated) DEVICE — SUTURE, NYLON, 10-0, 12 IN, CU5, DA, BLACK

## (undated) DEVICE — SYRINGE, HYPODERMIC, TB, W/O NEEDLE, 1 CC, SLIP TIP

## (undated) DEVICE — MANIFOLD, 4 PORT NEPTUNE STANDARD

## (undated) DEVICE — PAD, EYE, OVAL, 1 5/8 X 2 5/8 IN, STERILE

## (undated) DEVICE — CANNULA, 27G X 22MM, ANTERIOR, CHAMBER, RYCROFT

## (undated) DEVICE — RETRACTOR, IRIS, FLEXIBLE